# Patient Record
Sex: MALE | Race: WHITE | NOT HISPANIC OR LATINO | Employment: OTHER | URBAN - METROPOLITAN AREA
[De-identification: names, ages, dates, MRNs, and addresses within clinical notes are randomized per-mention and may not be internally consistent; named-entity substitution may affect disease eponyms.]

---

## 2017-01-10 ENCOUNTER — CLINICAL SUPPORT (OUTPATIENT)
Dept: REHABILITATION | Facility: HOSPITAL | Age: 62
End: 2017-01-10
Attending: ORTHOPAEDIC SURGERY
Payer: COMMERCIAL

## 2017-01-10 DIAGNOSIS — M19.049 CMC ARTHRITIS: Primary | ICD-10-CM

## 2017-01-10 DIAGNOSIS — R29.898 DECREASED PINCH STRENGTH: ICD-10-CM

## 2017-01-10 DIAGNOSIS — M25.649 STIFFNESS OF THUMB JOINT: ICD-10-CM

## 2017-01-10 DIAGNOSIS — M25.60 RANGE OF MOTION DEFICIT: ICD-10-CM

## 2017-01-10 DIAGNOSIS — M25.541 PAIN IN THUMB JOINT WITH MOVEMENT OF RIGHT HAND: ICD-10-CM

## 2017-01-10 PROCEDURE — 97140 MANUAL THERAPY 1/> REGIONS: CPT

## 2017-01-10 PROCEDURE — 97110 THERAPEUTIC EXERCISES: CPT

## 2017-01-10 PROCEDURE — 97035 APP MDLTY 1+ULTRASOUND EA 15: CPT

## 2017-01-10 NOTE — PROGRESS NOTES
"OT Daily Progress Note    Patient:  Mc GAMBLE [facility] #:  [id number]   Date of Note: 01/10/2017   Referring Physician:  Carlene Camp, *  Diagnosis:  R CMC Arthroplasty, volar capsulodesis, EPL stabilization, 1st dorsal compartment release , trapezium excision 9/21/2016  Encounter Diagnoses   Name Primary?    Range of motion deficit     Decreased pinch strength     Stiffness of thumb joint     Pain in thumb joint with movement of right hand     CMC arthritis Yes      NO FOTO/ 20 VISITS   VISIT 2 OF 12     Start Time: 115  End Time: 2  Total Time: 45 min  Group Time: 0      Subjective:   "Fingers are stiff, but I'm moving my thumb a little better, but its sore."  Pain: 3-4 v out of 10     Objective:   Patient seen by OT this session. Treatment  consist of the following: Patient received paraffin bath to R  hand(s) for 10  minutes to increase blood flow, circulation, pain management and for tissue elasticity prior to therex. Patient received ultrasound to  Thenar and radial wrist region to increase blood flow, circulation, tissue elasticity, pain management and for wound/scar management for 8 minutes @ 3 Mhz, Intensity 0.5  w/cm2 at 100%  duty cycle.    Performed MT including mobilization grade I-II  to IP/MP of thumb and digits  followed by PROM to increase joint mobility/flexibility followed by Blocking FDP, FDS, thumb flexion, opposition to ring finger,  reverse blocking PIP, intrinsic +/-, composite fist, finger ab/ad,  circumduction, ab/ad x 10 reps and wrist flex, ext, RD, UD x 10 reps each.  Added yellow clothespin for key pinch and 3 pt pinch x 10 reps; blue rubberband for EPL/APB and EDC strengthening x 10 reps each;  Performed "place and hold" for wrist extension for isometric strengthening against gravity x 10 reps.  Added yellow digi flex for isolated and composite finger flexion x 10 reps each.  Reviewed HEP,  Patient reported good understanding and use and HEP,modality use.     Treatment: " Paraffin x 10 min, Ultrasound x 8 min, Therapeutic exercises x 15 min and Manual therapy x 12  min     Assessment:  Patient compliant with orthotic use, wear and care schedule.  PIP extension lags remain.  Improved thumb mobility with opposition to ring finger.  Thumb IP/MP remain stiff and limited.  Improved CMC ROM noted.   Wrist extension remains limited at end range.  Significant fatigue and muscle atrophy/wasting noted of both intrinsic and extrinsic muscles of hand / forearm with resistive exercises.   Pt will continue to benefit from skilled OT intervention.   Patient is making good progress toward established goals.   Patient continues to demonstrate limitation  with  ROM, Joint mobility, Stiffness, Decreased fine motor coordination, Decreased strength, Continued pain and Scar adhesions.      Goals: (4 weeks)  Cont POC  1) Patient to be IND with HEP, orthotic use, wear and care schedule  and modalities for pain management  2) Increase ROM 3-5 degrees to increase functional hand use for ADLs/work/leisure activities  3) Assess  and pinch as able and set goals accordingly        Plan:  Continue 1-2x week x 6-8 weeks  during the certification period from   12/21/2016  to 2/21/2017  in pursuit of  OT goals.

## 2017-01-17 ENCOUNTER — CLINICAL SUPPORT (OUTPATIENT)
Dept: REHABILITATION | Facility: HOSPITAL | Age: 62
End: 2017-01-17
Payer: COMMERCIAL

## 2017-01-17 DIAGNOSIS — M19.049 CMC ARTHRITIS: Primary | ICD-10-CM

## 2017-01-17 DIAGNOSIS — R29.898 DECREASED PINCH STRENGTH: ICD-10-CM

## 2017-01-17 DIAGNOSIS — M25.541 PAIN IN THUMB JOINT WITH MOVEMENT OF RIGHT HAND: ICD-10-CM

## 2017-01-17 DIAGNOSIS — M25.60 RANGE OF MOTION DEFICIT: ICD-10-CM

## 2017-01-17 DIAGNOSIS — M25.649 STIFFNESS OF THUMB JOINT: ICD-10-CM

## 2017-01-17 PROCEDURE — 97110 THERAPEUTIC EXERCISES: CPT

## 2017-01-17 PROCEDURE — 97140 MANUAL THERAPY 1/> REGIONS: CPT

## 2017-01-17 PROCEDURE — 97035 APP MDLTY 1+ULTRASOUND EA 15: CPT

## 2017-01-18 ENCOUNTER — TELEPHONE (OUTPATIENT)
Dept: GENETICS | Facility: CLINIC | Age: 62
End: 2017-01-18

## 2017-01-18 NOTE — TELEPHONE ENCOUNTER
Spoke to Mr. Jeter disclose positive genetic test result for Marfan syndrome. 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 The patient gave consent to share this result with his daughter ([id number]) so she can be tested. We reviewed that each of his 4 daughters would have 50% chance of having this mutation. A copy of the report will be mailed to the home. Mr. Jeter expressed understanding and denied questions at this time.

## 2017-01-24 NOTE — PROGRESS NOTES
"OT Daily Progress Note    Patient:  Mc GAMBLE [facility] #:  [id number]   Date of Note: 1/17/2017  Referring Physician:  Carlene Camp, *  Diagnosis:  R CMC Arthroplasty, volar capsulodesis, EPL stabilization, 1st dorsal compartment release , trapezium excision 9/21/2016  Encounter Diagnoses   Name Primary?    Range of motion deficit     Decreased pinch strength     Stiffness of thumb joint     Pain in thumb joint with movement of right hand     CMC arthritis Yes      NO FOTO/ 20 VISITS   VISIT 3 OF 12     Start Time: 115  End Time: 2  Total Time: 45 min  Group Time: 0      Subjective:   "I think its doing ok, It's still stiff, but I'm trying to move it more, it hurts when I hit the scar."  Pain: 3-4 out of 10     Objective:   Patient seen by OT this session. Treatment  consist of the following: Patient received paraffin bath to R  hand(s) for 10  minutes to increase blood flow, circulation, pain management and for tissue elasticity prior to therex. Patient received ultrasound to  Thenar and radial wrist region to increase blood flow, circulation, tissue elasticity, pain management and for wound/scar management for 8 minutes @ 3 Mhz, Intensity 0.5  w/cm2 at 100%  duty cycle.    Performed MT including mobilization grade I-II  to IP/MP of thumb and digits  followed by PROM to increase joint mobility/flexibility followed by Blocking FDP, FDS, thumb flexion, opposition to ring finger,  reverse blocking PIP, intrinsic +/-, composite fist, finger ab/ad,  circumduction, ab/ad x 10 reps and wrist flex, ext, RD, UD x 10 reps each.  Added yellow clothespin for key pinch and 3 pt pinch x 10 reps; blue rubberband for EPL/APB and EDC strengthening x 10 reps each;  Performed "place and hold" for wrist extension for isometric strengthening against gravity x 10 reps.  Added yellow digi flex for isolated and composite finger flexion x 10 reps each.  Reviewed HEP,  Patient reported good understanding and use and " HEP,modality use.     Treatment: Paraffin x 10 min, Ultrasound x 8 min, Therapeutic exercises x 15 min and Manual therapy x 12  min     Assessment:  Patient compliant with orthotic use, wear and care schedule.  PIP extension lags remain.  Improved thumb mobility with opposition to ring finger.  Thumb IP/MP remain stiff and limited.  Improved CMC ROM noted.   Wrist extension remains limited at end range.  Significant fatigue and muscle atrophy/wasting noted of both intrinsic and extrinsic muscles of hand / forearm with resistive exercises.   Pt will continue to benefit from skilled OT intervention.   Patient is making good progress toward established goals.   Patient continues to demonstrate limitation  with  ROM, Joint mobility, Stiffness, Decreased fine motor coordination, Decreased strength, Continued pain and Scar adhesions.      Goals: (4 weeks)  Cont POC  1) Patient to be IND with HEP, orthotic use, wear and care schedule  and modalities for pain management  2) Increase ROM 3-5 degrees to increase functional hand use for ADLs/work/leisure activities  3) Assess  and pinch as able and set goals accordingly        Plan:  Continue 1-2x week x 6-8 weeks  during the certification period from   12/21/2016  to 2/21/2017  in pursuit of  OT goals.

## 2017-01-26 ENCOUNTER — CLINICAL SUPPORT (OUTPATIENT)
Dept: REHABILITATION | Facility: HOSPITAL | Age: 62
End: 2017-01-26
Payer: COMMERCIAL

## 2017-01-26 DIAGNOSIS — M25.60 RANGE OF MOTION DEFICIT: ICD-10-CM

## 2017-01-26 DIAGNOSIS — M25.649 STIFFNESS OF THUMB JOINT: ICD-10-CM

## 2017-01-26 DIAGNOSIS — M25.541 PAIN IN THUMB JOINT WITH MOVEMENT OF RIGHT HAND: ICD-10-CM

## 2017-01-26 DIAGNOSIS — M19.049 CMC ARTHRITIS: Primary | ICD-10-CM

## 2017-01-26 DIAGNOSIS — R29.898 DECREASED PINCH STRENGTH: ICD-10-CM

## 2017-01-26 PROCEDURE — 97140 MANUAL THERAPY 1/> REGIONS: CPT

## 2017-01-26 PROCEDURE — 97035 APP MDLTY 1+ULTRASOUND EA 15: CPT

## 2017-01-26 PROCEDURE — 97110 THERAPEUTIC EXERCISES: CPT

## 2017-01-26 NOTE — PROGRESS NOTES
"OT Daily Progress Note    Patient:  Mc GAMBLE [facility] #:  [id number]   Date of Note: 1/26/2017  Referring Physician:  Carlene Camp, *  Diagnosis:  R CMC Arthroplasty, volar capsulodesis, EPL stabilization, 1st dorsal compartment release , trapezium excision 9/21/2016  Encounter Diagnoses   Name Primary?    Range of motion deficit     Decreased pinch strength     Stiffness of thumb joint     Pain in thumb joint with movement of right hand     CMC arthritis Yes      NO FOTO/ 20 VISITS   VISIT 4 OF 12     Start Time: 115  End Time: 2  Total Time: 45 min  Group Time: 0      Subjective:   "I can tell its getting stronger.  It's still stiff, but I'm using it better to grasp things. "   Pain: 3  out of 10     Objective:   Patient seen by OT this session. Treatment  consist of the following: Patient received paraffin bath to R  hand(s) for 10  minutes to increase blood flow, circulation, pain management and for tissue elasticity prior to therex. Patient received ultrasound to  Thenar and radial wrist region to increase blood flow, circulation, tissue elasticity, pain management and for wound/scar management for 8 minutes @ 3 Mhz, Intensity 0.5  w/cm2 at 100%  duty cycle.    Performed MT including mobilization grade I-II  to IP/MP of thumb and digits  followed by PROM to increase joint mobility/flexibility followed by Blocking FDP, FDS, thumb flexion, opposition to ring finger,  reverse blocking PIP, intrinsic +/-, composite fist, finger ab/ad,  circumduction, ab/ad x 10 reps and wrist flex, ext, RD, UD x 10 reps each.  Added yellow clothespin for key pinch and 3 pt pinch x 10 reps; blue rubberband for EPL/APB and EDC strengthening x 10 reps each;  Performed "place and hold" for wrist extension for isometric strengthening against gravity x 10 reps.  Added yellow digi flex for isolated and composite finger flexion x 10 reps each.  Adjusted thumb orthotic to increase static progressive component to improve " passive thumb MP/IP flexion with tension.  Encouraged 30 min use, 3-4 x daily as tolerated.  Reviewed HEP,  Patient reported good understanding and use and HEP,modality use.     Treatment: Paraffin x 10 min, Ultrasound x 8 min, Therapeutic exercises x 15 min and Manual therapy x 12  min     Assessment:  Patient compliant with orthotic use, wear and care schedule.  PIP extension lags remain.  Improved thumb mobility with opposition to ring finger.  Thumb IP/MP remain stiff and limited.  Improved CMC ROM noted.   Wrist extension remains limited at end range.  Significant fatigue and muscle atrophy/wasting noted of both intrinsic and extrinsic muscles of hand / forearm with resistive exercises.  Strength appears to be improving per report and tolerance.  Pt will continue to benefit from skilled OT intervention.   Patient is making good progress toward established goals.   Patient continues to demonstrate limitation  with  ROM, Joint mobility, Stiffness, Decreased fine motor coordination, Decreased strength, Continued pain and Scar adhesions.      Goals: (4 weeks)  Cont POC  1) Patient to be IND with HEP, orthotic use, wear and care schedule  and modalities for pain management  2) Increase ROM 3-5 degrees to increase functional hand use for ADLs/work/leisure activities  3) Assess  and pinch as able and set goals accordingly        Plan:  Continue 1-2x week x 6-8 weeks  during the certification period from   12/21/2016  to 2/21/2017  in pursuit of  OT goals.

## 2017-01-30 ENCOUNTER — TELEPHONE (OUTPATIENT)
Dept: RHEUMATOLOGY | Facility: CLINIC | Age: 62
End: 2017-01-30

## 2017-01-30 ENCOUNTER — PATIENT MESSAGE (OUTPATIENT)
Dept: RHEUMATOLOGY | Facility: CLINIC | Age: 62
End: 2017-01-30

## 2017-01-30 RX ORDER — PREDNISONE 10 MG/1
20 TABLET ORAL DAILY
Qty: 60 TABLET | Refills: 0 | Status: SHIPPED | OUTPATIENT
Start: 2017-01-30 | End: 2017-03-01

## 2017-02-02 ENCOUNTER — CLINICAL SUPPORT (OUTPATIENT)
Dept: REHABILITATION | Facility: HOSPITAL | Age: 62
End: 2017-02-02
Payer: COMMERCIAL

## 2017-02-02 DIAGNOSIS — M25.649 STIFFNESS OF THUMB JOINT: ICD-10-CM

## 2017-02-02 DIAGNOSIS — M19.049 CMC ARTHRITIS: Primary | ICD-10-CM

## 2017-02-02 DIAGNOSIS — M25.541 PAIN IN THUMB JOINT WITH MOVEMENT OF RIGHT HAND: ICD-10-CM

## 2017-02-02 DIAGNOSIS — M25.60 RANGE OF MOTION DEFICIT: ICD-10-CM

## 2017-02-02 DIAGNOSIS — R29.898 DECREASED PINCH STRENGTH: ICD-10-CM

## 2017-02-02 PROCEDURE — 97110 THERAPEUTIC EXERCISES: CPT

## 2017-02-02 PROCEDURE — 97140 MANUAL THERAPY 1/> REGIONS: CPT

## 2017-02-02 PROCEDURE — 97035 APP MDLTY 1+ULTRASOUND EA 15: CPT

## 2017-02-02 NOTE — PROGRESS NOTES
"OT Daily Progress Note    Patient:  Mc GAMBLE [facility] #:  [id number]   Date of Note: 2/2/2017  Referring Physician:  Carlene Camp, *  Diagnosis:  R CMC Arthroplasty, volar capsulodesis, EPL stabilization, 1st dorsal compartment release , trapezium excision 9/21/2016  Encounter Diagnoses   Name Primary?    Range of motion deficit     Decreased pinch strength     Stiffness of thumb joint     Pain in thumb joint with movement of right hand     CMC arthritis Yes      NO FOTO/ 20 VISITS   VISIT 5 OF 12     Start Time: 1015  End Time: 11  Total Time: 45 min  Group Time: 0      Subjective:   "I was able to push a spray top, and I'm using clamps for  strengthening. It's still real weak, but I can tell its better. "   Pain: 3  out of 10     Objective:   Patient seen by OT this session. Treatment  consist of the following: Patient received paraffin bath to R  hand(s) for 10  minutes to increase blood flow, circulation, pain management and for tissue elasticity prior to therex.  Patient received ultrasound to  Thenar MP/IP joint and INDEX MP region to increase blood flow, circulation, tissue elasticity, pain management and for wound/scar management for 8 minutes @ 3 Mhz, Intensity 0.5  w/cm2 at 100%  duty cycle.    Performed MT including mobilization grade I-II  To wrist,  IP/MP of thumb and digits  followed by PROM to increase joint mobility/flexibility followed by Blocking FDP, FDS, thumb flexion, opposition to ring finger,  reverse blocking PIP, intrinsic +/-, composite fist, finger ab/ad,  circumduction, ab/ad x 10 reps and wrist flex, ext, RD, UD x 10 reps each.  Upgraded to red and green  clothespin for key pinch and 3 pt pinch x 10 reps; blue rubberband for EPL/APB and EDC strengthening x 10 reps each;  Performed "place and hold" for wrist extension for isometric strengthening against gravity x 10 reps. Upgraded to red  digi flex for isolated and composite finger flexion x 10 reps each.  Added 25 lbs. " PHG for gross  x 10 reps.    Encouraged low tension and increased time to promote stretching of MP/PIP for 30 min duration, 3-4 x daily as tolerated.  Reviewed HEP,  Patient reported good understanding and use and HEP,modality use. Re-assessed as follows:      ROM: THUMB / INDEX/ MIDDLE / RING /SMALL   MP  0/20  0/80  25/90  0/60  0/55   PIP 0/31 26/92  30/90  34/102 48/105   DIP ---- 0/38 0/60 0/54  0/45     BENAVIDES 51 184  185 182  157         +31  +48  +40  +24  +6     WRIST EXT/FLEX  25 / 65  (+15)      R) 3 LBS.   L) 75 LBS   KEY PINCH R) 4 PSI  L) 10 PSI   3PT PINCH R) 3 PSI  L) 13.5 PSI   2PT PINCH R) 2 PSI L) 12 PSI     Treatment: Paraffin x 10 min, Ultrasound x 8 min, Therapeutic exercises x 15 min and Manual therapy x 12  min     Assessment:  Patient compliant with orthotic use, wear and care schedule.  PIP extension lags remain.  Improved thumb mobility with opposition to ring finger and medial aspect of small finger.  Thumb IP/MP remain stiff and limited, but ROM overall improved.  Improved CMC ROM noted.   Wrist extension remains limited at end range.  Significant fatigue and muscle atrophy/wasting noted of both intrinsic and extrinsic muscles of hand / forearm with resistive exercises.  Strength appears to be improving per report and tolerance.   and pinch strength remain limited.  Pt will continue to benefit from skilled OT intervention.   Patient is making good progress toward established goals.   Patient continues to demonstrate limitation  with  ROM, Joint mobility, Stiffness, Decreased fine motor coordination, Decreased strength, Continued pain and Scar adhesions.      Goals: (4 weeks)  Cont POC  1) Patient to be IND with HEP, orthotic use, wear and care schedule  and modalities for pain management  2) Increase ROM 3-5 degrees to increase functional hand use for ADLs/work/leisure activities  3) Assess  and pinch as able and set goals accordingly        Plan:  Continue 1-2x week x 6-8  weeks  during the certification period from   12/21/2016  to 2/21/2017  in pursuit of  OT goals.

## 2017-02-09 ENCOUNTER — CLINICAL SUPPORT (OUTPATIENT)
Dept: REHABILITATION | Facility: HOSPITAL | Age: 62
End: 2017-02-09
Payer: COMMERCIAL

## 2017-02-09 ENCOUNTER — OFFICE VISIT (OUTPATIENT)
Dept: ORTHOPEDICS | Facility: CLINIC | Age: 62
End: 2017-02-09
Payer: COMMERCIAL

## 2017-02-09 VITALS
HEIGHT: 78 IN | WEIGHT: 213.88 LBS | HEART RATE: 91 BPM | SYSTOLIC BLOOD PRESSURE: 128 MMHG | BODY MASS INDEX: 24.74 KG/M2 | DIASTOLIC BLOOD PRESSURE: 73 MMHG

## 2017-02-09 DIAGNOSIS — M25.60 RANGE OF MOTION DEFICIT: ICD-10-CM

## 2017-02-09 DIAGNOSIS — R29.898 DECREASED PINCH STRENGTH: ICD-10-CM

## 2017-02-09 DIAGNOSIS — M25.649 STIFFNESS OF THUMB JOINT: ICD-10-CM

## 2017-02-09 DIAGNOSIS — M19.049 CMC ARTHRITIS: Primary | ICD-10-CM

## 2017-02-09 DIAGNOSIS — M18.11 PRIMARY OSTEOARTHRITIS OF FIRST CARPOMETACARPAL JOINT OF RIGHT HAND: ICD-10-CM

## 2017-02-09 DIAGNOSIS — M25.541 PAIN IN THUMB JOINT WITH MOVEMENT OF RIGHT HAND: ICD-10-CM

## 2017-02-09 DIAGNOSIS — Z98.890 POSTOPERATIVE STATE: Primary | ICD-10-CM

## 2017-02-09 PROCEDURE — 97035 APP MDLTY 1+ULTRASOUND EA 15: CPT

## 2017-02-09 PROCEDURE — 99213 OFFICE O/P EST LOW 20 MIN: CPT | Mod: S$GLB,,, | Performed by: ORTHOPAEDIC SURGERY

## 2017-02-09 PROCEDURE — 97110 THERAPEUTIC EXERCISES: CPT

## 2017-02-09 PROCEDURE — 99999 PR PBB SHADOW E&M-EST. PATIENT-LVL III: CPT | Mod: PBBFAC,,, | Performed by: ORTHOPAEDIC SURGERY

## 2017-02-09 PROCEDURE — 97140 MANUAL THERAPY 1/> REGIONS: CPT

## 2017-02-09 NOTE — PROGRESS NOTES
"OT Daily Progress Note    Patient:  Mc GAMBLE [facility] #:  [id number]   Date of Note: 2/9/2017  Referring Physician:  Carlene Camp, *  Diagnosis:  R CMC Arthroplasty, volar capsulodesis, EPL stabilization, 1st dorsal compartment release , trapezium excision 9/21/2016  Encounter Diagnoses   Name Primary?    Range of motion deficit     Decreased pinch strength     Stiffness of thumb joint     Pain in thumb joint with movement of right hand     CMC arthritis Yes      NO FOTO/ 20 VISITS   VISIT 6 OF 12     Start Time: 1115  End Time: 12  Total Time: 45 min  Group Time: 0      Subjective:   "I'm going to the gym but my  is still weak and hard to pull.   "   Pain: 3  out of 10     Objective:   Patient seen by MD prior to OT visit this day.  Improved progress noted in last month.      Patient seen by OT this session. Treatment  consist of the following: Patient received paraffin bath to R  hand(s) for 10  minutes to increase blood flow, circulation, pain management and for tissue elasticity prior to therex.  Patient received ultrasound to  Thenar MP/IP joint and INDEX MP region to increase blood flow, circulation, tissue elasticity, pain management and for wound/scar management for 8 minutes @ 3 Mhz, Intensity 0.5  w/cm2 at 100%  duty cycle.    Performed MT including mobilization grade I-II  To wrist,  IP/MP of thumb and digits  followed by PROM to increase joint mobility/flexibility followed by Blocking FDP, FDS, thumb flexion, opposition to ring finger,  reverse blocking PIP, intrinsic +/-, composite fist, finger ab/ad,  circumduction, ab/ad x 10 reps and wrist flex, ext, RD, UD x 10 reps each.  Added 2# wrist flex, ext, RD, UD, sup/pron x 10 reps each for UE strengthening.  Upgraded to red and green  clothespin for key pinch and 3 pt pinch x 10 reps; blue rubberband for EPL/APB and EDC strengthening x 10 reps each;   Upgraded to red  digi flex for isolated and composite finger flexion x 10 reps each.  " Added 35 lbs./25lbs PHG for gross  x 10 reps each.    Encouraged low tension and increased time with static progressive orthotic to promote stretching of MP/PIP for 30 min duration, 3-4 x daily as tolerated.  Reviewed HEP,  Patient reported good understanding and use and HEP,modality use. Re-assessed 2/2/16:      Treatment: Paraffin x 10 min, Ultrasound x 8 min, Therapeutic exercises x 15 min and Manual therapy x 12  min     Assessment:  Patient compliant with orthotic use, wear and care schedule.  PIP extension lags remain in index/small finger, but continues to improve  Improved thumb mobility with opposition to ring finger and medial aspect of small finger.  Thumb IP/MP remain stiff and limited, but ROM overall improved.  Improved CMC ROM noted.   Wrist extension remains limited at end range.  Decreased muscle fatigue reported.  Strength appears to be improving per report and tolerance.   and pinch strength remain limited.  Pt will continue to benefit from skilled OT intervention.   Patient is making good progress toward established goals.   Patient continues to demonstrate limitation  with  ROM, Joint mobility, Stiffness, Decreased fine motor coordination, Decreased strength, Continued pain and Scar adhesions.      Goals: (4 weeks)  Cont POC  1) Patient to be IND with HEP, orthotic use, wear and care schedule  and modalities for pain management  2) Increase ROM 3-5 degrees to increase functional hand use for ADLs/work/leisure activities  3) Assess  and pinch as able and set goals accordingly        Plan:  Continue 1-2x week x 6-8 weeks  during the certification period from   12/21/2016  to 2/21/2017  in pursuit of  OT goals.

## 2017-02-10 NOTE — PROGRESS NOTES
"Mr. Jeter is here today for a post-operative visit.he is 4.5 months status post     PROCEDURE:  1. Right thumb CMC arthroplasty.  2. Right thumb MCP volar capsulodesis.  3. Right thumb EPL tendon stabilization of sagittal band.  4. First dorsal compartment release.  5. Beaver tendon for transfer.     by Dr. Camp on 9/21/16. he reports that he is doing well in terms of pain but continues to have trouble with certain movements of his thumb and stiffness of all of the fingers of the right hand. He is working in therapy with Dora at [facility] as of 12/21/2016- he had previously been doing therapy in [place] however had increased difficulty with stiffness and movement of the thumb and right hand fingers and advised to change therapy to [facility]. he denies any s/s of infection, fever, chills, and sweats since the time of the surgery. He states he is wearing the splint made for him in therapy with activity for protection.     Physical exam:    Vitals:    02/09/17 1104   BP: 128/73   Pulse: 91   Weight: 97 kg (213 lb 13.5 oz)   Height: 6' 6" (1.981 m)   PainSc: 0-No pain   PainLoc: Hand     The right hand shows not s/s of infection. I do no appreciate swelling of the hand or fingers. He continues to have stiffness at the PIP joints of digits 2-5 and is unable to lay fingers flat on table- but has improved since last visit. The abduction and ROM of the MCP of his thumb has greatly improved since his last visit however he still has stiffness of the IP joint, specifically when trying to make a grasping motion as it to hold a can.  Normal ROM at the wrist.     Assessment:   4.5 months status post     PROCEDURE:  1. Right thumb CMC arthroplasty.  2. Right thumb MCP volar capsulodesis.  3. Right thumb EPL tendon stabilization of sagittal band.  4. First dorsal compartment release.  5. Beaver tendon for transfer.    Plan:  Mc was seen today for post-op evaluation and pain.    Diagnoses and all orders for this " visit:    Postoperative state    Primary osteoarthritis of first carpometacarpal joint of right hand    - Definite improvement with therapy with Dora at [facility]  - Continue OT  - RTC 3.5 m for 8 m post op

## 2017-02-13 NOTE — PROGRESS NOTES
I have personally taken the history and examined the patient. I agree with the Hand Surgery NP's note. The plan will be cont OT> Pt has improved functionality since changing to new OT. Less stiffness, able to grasp. Pt happy with improvements.

## 2017-02-14 ENCOUNTER — CLINICAL SUPPORT (OUTPATIENT)
Dept: REHABILITATION | Facility: HOSPITAL | Age: 62
End: 2017-02-14
Payer: COMMERCIAL

## 2017-02-14 DIAGNOSIS — M19.049 CMC ARTHRITIS: Primary | ICD-10-CM

## 2017-02-14 DIAGNOSIS — M25.60 RANGE OF MOTION DEFICIT: ICD-10-CM

## 2017-02-14 DIAGNOSIS — M25.541 PAIN IN THUMB JOINT WITH MOVEMENT OF RIGHT HAND: ICD-10-CM

## 2017-02-14 DIAGNOSIS — R29.898 DECREASED PINCH STRENGTH: ICD-10-CM

## 2017-02-14 DIAGNOSIS — M25.649 STIFFNESS OF THUMB JOINT: ICD-10-CM

## 2017-02-14 PROCEDURE — 97110 THERAPEUTIC EXERCISES: CPT

## 2017-02-14 PROCEDURE — 97035 APP MDLTY 1+ULTRASOUND EA 15: CPT

## 2017-02-14 PROCEDURE — 97140 MANUAL THERAPY 1/> REGIONS: CPT

## 2017-02-14 NOTE — PROGRESS NOTES
"OT Daily Progress Note    Patient:  Mc GAMBLE [facility] #:  [id number]   Date of Note: 2/14/2017  Referring Physician:  Carlene Camp, *  Diagnosis:  R CMC Arthroplasty, volar capsulodesis, EPL stabilization, 1st dorsal compartment release , trapezium excision 9/21/2016  Encounter Diagnoses   Name Primary?    Range of motion deficit     Decreased pinch strength     Stiffness of thumb joint     Pain in thumb joint with movement of right hand     CMC arthritis Yes      NO FOTO/ 20 VISITS   VISIT 7 OF 12     Start Time: 1115  End Time: 12  Total Time: 45 min  Group Time: 0      Subjective:   "I tried to use a hammer, it wasn't too good, but I was putting up a fence.   I use splint (static progressive splint) a few times a day. "   Pain: 3  out of 10     Objective:     Patient seen by OT this session. Treatment  consist of the following: Patient received paraffin bath to R  hand(s) for 10  minutes to increase blood flow, circulation, pain management and for tissue elasticity prior to therex.  Patient received ultrasound to  Thenar MP/IP joint and INDEX MP region to increase blood flow, circulation, tissue elasticity, pain management and for wound/scar management for 8 minutes @ 3 Mhz, Intensity 0.5  w/cm2 at 100%  duty cycle.    Performed MT including mobilization grade I-II  To wrist,  IP/MP of thumb and digits  followed by PROM to increase joint mobility/flexibility followed by Blocking FDP, FDS, thumb flexion, opposition to ring finger,  reverse blocking PIP, intrinsic +/-, composite fist, finger ab/ad,  circumduction, ab/ad x 10 reps and wrist flex, ext, RD, UD x 10 reps each.  Added 2# wrist flex, ext, RD, UD (gravity eliminated) , sup/pron x 10 reps each for UE strengthening. Performed  red  clothespin for key pinch and 3 pt pinch x 10 reps;    Upgraded to red  digi flex for isolated and composite finger flexion x 10 reps each.  Added 35 lbs./25lbs PHG for gross  x 10 reps each.    Encouraged low " tension and increased time with static progressive orthotic to promote stretching of MP/PIP for 30 min duration, 3-4 x daily as tolerated.  Reviewed HEP,  Patient reported good understanding and use and HEP,modality use. Re-assessed 2/2/16:      Treatment: Paraffin x 10 min, Ultrasound x 8 min, Therapeutic exercises x 15 min and Manual therapy x 12  min     Assessment:  Patient compliant with orthotic use, wear and care schedule.  PIP extension lags remain in index/small finger, but continues to improve.     Improved thumb mobility with opposition to ring finger and medial aspect of small finger.  Thumb IP/MP remain stiff and limited, but ROM overall improved.  Improved CMC ROM noted.   Wrist extension remains limited at end range.  Muscle fatigue noted this day secondary to overuse reported.   Strength appears to be improving per report and tolerance.   and pinch strength remain limited.  Pt will continue to benefit from skilled OT intervention.   Patient is making good progress toward established goals.   Patient continues to demonstrate limitation  with  ROM, Joint mobility, Stiffness, Decreased fine motor coordination, Decreased strength, Continued pain and Scar adhesions.      Goals: (4 weeks)  Cont POC  1) Patient to be IND with HEP, orthotic use, wear and care schedule  and modalities for pain management  2) Increase ROM 3-5 degrees to increase functional hand use for ADLs/work/leisure activities  3) Assess  and pinch as able and set goals accordingly        Plan:  Continue 1-2x week x 6-8 weeks  during the certification period from   12/21/2016  to 2/21/2017  in pursuit of  OT goals.

## 2017-02-23 ENCOUNTER — CLINICAL SUPPORT (OUTPATIENT)
Dept: REHABILITATION | Facility: HOSPITAL | Age: 62
End: 2017-02-23
Payer: COMMERCIAL

## 2017-02-23 DIAGNOSIS — M25.541 PAIN IN THUMB JOINT WITH MOVEMENT OF RIGHT HAND: ICD-10-CM

## 2017-02-23 DIAGNOSIS — R29.898 DECREASED PINCH STRENGTH: ICD-10-CM

## 2017-02-23 DIAGNOSIS — M19.049 CMC ARTHRITIS: Primary | ICD-10-CM

## 2017-02-23 DIAGNOSIS — M25.60 RANGE OF MOTION DEFICIT: ICD-10-CM

## 2017-02-23 DIAGNOSIS — M25.649 STIFFNESS OF THUMB JOINT: ICD-10-CM

## 2017-02-23 PROCEDURE — 97035 APP MDLTY 1+ULTRASOUND EA 15: CPT

## 2017-02-23 PROCEDURE — 97140 MANUAL THERAPY 1/> REGIONS: CPT

## 2017-02-23 PROCEDURE — 97110 THERAPEUTIC EXERCISES: CPT

## 2017-02-23 NOTE — PROGRESS NOTES
"OT Daily Progress Note    Patient:  Mc GAMBLE [facility] #:  [id number]   Date of Note: 2/23/2017  Referring Physician:  Carlene Camp, *  Diagnosis:  R CMC Arthroplasty, volar capsulodesis, EPL stabilization, 1st dorsal compartment release , trapezium excision 9/21/2016  Encounter Diagnoses   Name Primary?    Range of motion deficit     Decreased pinch strength     Stiffness of thumb joint     Pain in thumb joint with movement of right hand     CMC arthritis Yes      NO FOTO/ 20 VISITS   VISIT 8 OF 12     Start Time: 115  End Time: 2  Total Time: 45 min  Group Time: 0      Subjective:   "I only have pain in joint (MP of thumb), but I'm exercising the wrist more. "   Pain: 3  out of 10     Objective:     Patient seen by OT this session. Treatment  consist of the following: Patient received paraffin bath to R  hand(s) for 10  minutes to increase blood flow, circulation, pain management and for tissue elasticity prior to therex.  Patient received ultrasound to  Thenar MP/IP joint and INDEX MP region to increase blood flow, circulation, tissue elasticity, pain management and for wound/scar management for 8 minutes @ 3 Mhz, Intensity 0.5  w/cm2 at 100%  duty cycle.    Performed MT including mobilization grade I-II  To wrist,  IP/MP of thumb and digits  followed by PROM to increase joint mobility/flexibility followed by therex including  Blocking FDP, FDS, thumb flexion, opposition to ring finger,  reverse blocking PIP, intrinsic +/-, composite fist, finger ab/ad,  circumduction, ab/ad x 10 reps and wrist flex, ext, RD, UD x 10 reps each.  Added 2# wrist flex, ext, RD, UD (gravity eliminated) , sup/pron x 10 reps each for UE strengthening. Performed  red  clothespin for key pinch and 3 pt pinch x 10 reps;    Upgraded to red  digi flex for isolated and composite finger flexion x 10 reps each.  Added 35 lbs./25lbs PHG for gross  x 10 reps each.    Reviewed HEP,  Patient reported good understanding and use " and HEP,modality use. Re-assessed 2/2/16:      ROM: THUMB / INDEX/ MIDDLE / RING /SMALL   MP   0/20   0/80   0/90   0/70   0/65   PIP  0/31  25/92 27/90 25/102 45/102   DIP  ----     0/50  0/55  0/54  0/48      BENAVIDES  51  197   208  201   170            +31       +61       +63   +43 +19     WRIST EXT/FLEX 25 / 65 (+15)       R) 3 LBS. L) 75 LBS   KEY PINCH R) 5 PSI   (+1)  L) 10 PSI   3PT PINCH R) 3 PSI L) 13.5 PSI   2PT PINCH R) 2 PSI L) 12 PSI       Treatment: Paraffin x 10 min, Ultrasound x 8 min, Therapeutic exercises x 15 min and Manual therapy x 12  min     Assessment:  ROM continues to improve.  Slight increase in key pinch, no significant change in  or 2/3pt pinch Patient compliant with orthotic use, wear and care schedule.  PIP extension lags remain in index/small finger, but continues to improve.     Improved thumb mobility with opposition to ring finger and medial aspect of small finger.  Thumb IP/MP remain stiff and limited, but ROM overall improved.  Improved CMC ROM noted.   Wrist extension remains limited at end range.  Muscle fatigue noted this day secondary to overuse reported.   Strength appears to be improving per report and tolerance.   and pinch strength remain limited.  Pt will continue to benefit from skilled OT intervention.   Patient is making good progress toward established goals.   Patient continues to demonstrate limitation  with  ROM, Joint mobility, Stiffness, Decreased fine motor coordination, Decreased strength, Continued pain and Scar adhesions.      Goals: (4 weeks)  Cont POC  1) Patient to be IND with HEP, orthotic use, wear and care schedule  and modalities for pain management  2) Increase ROM 3-5 degrees to increase functional hand use for ADLs/work/leisure activities--met   3) Assess  and pinch as able and set goals accordingly --met     Updated goals:   1)  Increase ROM 3-5 degrees to increase functional hand use for ADLs/work/leisure activities  2)  Increase  pinch 1-3 psi's for taking care of chicken and household chores  3)  Increase  2-6 lbs. For grasping tools and performing home repairs     Plan:  Continue 1-2x week x 6-8 weeks  during the certification period from   2/23/2017 to 4/23/2017   in pursuit of  OT goals.      I certify the need for these services furnished under the plan of treatment and while under my care.     ____________________________________________________________________  Physician/Referring Practitioner   Date of Signature

## 2017-03-02 ENCOUNTER — CLINICAL SUPPORT (OUTPATIENT)
Dept: REHABILITATION | Facility: HOSPITAL | Age: 62
End: 2017-03-02
Payer: COMMERCIAL

## 2017-03-02 DIAGNOSIS — R29.898 DECREASED PINCH STRENGTH: ICD-10-CM

## 2017-03-02 DIAGNOSIS — M25.649 STIFFNESS OF THUMB JOINT: ICD-10-CM

## 2017-03-02 DIAGNOSIS — M25.541 PAIN IN THUMB JOINT WITH MOVEMENT OF RIGHT HAND: ICD-10-CM

## 2017-03-02 DIAGNOSIS — M25.60 RANGE OF MOTION DEFICIT: ICD-10-CM

## 2017-03-02 DIAGNOSIS — M19.049 CMC ARTHRITIS: Primary | ICD-10-CM

## 2017-03-02 PROCEDURE — 97110 THERAPEUTIC EXERCISES: CPT

## 2017-03-02 PROCEDURE — 97035 APP MDLTY 1+ULTRASOUND EA 15: CPT

## 2017-03-02 PROCEDURE — 97140 MANUAL THERAPY 1/> REGIONS: CPT

## 2017-03-07 ENCOUNTER — PATIENT MESSAGE (OUTPATIENT)
Dept: RHEUMATOLOGY | Facility: CLINIC | Age: 62
End: 2017-03-07

## 2017-03-09 ENCOUNTER — CLINICAL SUPPORT (OUTPATIENT)
Dept: REHABILITATION | Facility: HOSPITAL | Age: 62
End: 2017-03-09
Payer: COMMERCIAL

## 2017-03-09 DIAGNOSIS — M25.541 PAIN IN THUMB JOINT WITH MOVEMENT OF RIGHT HAND: ICD-10-CM

## 2017-03-09 DIAGNOSIS — M25.649 STIFFNESS OF THUMB JOINT: ICD-10-CM

## 2017-03-09 DIAGNOSIS — M25.60 RANGE OF MOTION DEFICIT: ICD-10-CM

## 2017-03-09 DIAGNOSIS — R29.898 DECREASED PINCH STRENGTH: ICD-10-CM

## 2017-03-09 DIAGNOSIS — M19.049 CMC ARTHRITIS: Primary | ICD-10-CM

## 2017-03-09 PROCEDURE — 97035 APP MDLTY 1+ULTRASOUND EA 15: CPT

## 2017-03-09 PROCEDURE — 97140 MANUAL THERAPY 1/> REGIONS: CPT

## 2017-03-09 PROCEDURE — 97110 THERAPEUTIC EXERCISES: CPT

## 2017-03-13 NOTE — PLAN OF CARE
"OT Daily Progress Note    Patient:  Mc GAMBLE [facility] #:  [id number]   Date of Note: 2/23/2017  Referring Physician:  Carlene Camp, *  Diagnosis:  R CMC Arthroplasty, volar capsulodesis, EPL stabilization, 1st dorsal compartment release , trapezium excision 9/21/2016  Encounter Diagnoses   Name Primary?    Range of motion deficit     Decreased pinch strength     Stiffness of thumb joint     Pain in thumb joint with movement of right hand     CMC arthritis Yes      NO FOTO/ 20 VISITS   VISIT 8 OF 12     Start Time: 115  End Time: 2  Total Time: 45 min  Group Time: 0      Subjective:   "I only have pain in joint (MP of thumb), but I'm exercising the wrist more. "   Pain: 3  out of 10     Objective:     Patient seen by OT this session. Treatment  consist of the following: Patient received paraffin bath to R  hand(s) for 10  minutes to increase blood flow, circulation, pain management and for tissue elasticity prior to therex.  Patient received ultrasound to  Thenar MP/IP joint and INDEX MP region to increase blood flow, circulation, tissue elasticity, pain management and for wound/scar management for 8 minutes @ 3 Mhz, Intensity 0.5  w/cm2 at 100%  duty cycle.    Performed MT including mobilization grade I-II  To wrist,  IP/MP of thumb and digits  followed by PROM to increase joint mobility/flexibility followed by therex including  Blocking FDP, FDS, thumb flexion, opposition to ring finger,  reverse blocking PIP, intrinsic +/-, composite fist, finger ab/ad,  circumduction, ab/ad x 10 reps and wrist flex, ext, RD, UD x 10 reps each.  Added 2# wrist flex, ext, RD, UD (gravity eliminated) , sup/pron x 10 reps each for UE strengthening. Performed  red  clothespin for key pinch and 3 pt pinch x 10 reps;    Upgraded to red  digi flex for isolated and composite finger flexion x 10 reps each.  Added 35 lbs./25lbs PHG for gross  x 10 reps each.    Reviewed HEP,  Patient reported good understanding and use " and HEP,modality use. Re-assessed 2/2/16:      ROM: THUMB / INDEX/ MIDDLE / RING /SMALL   MP   0/20   0/80   0/90   0/70   0/65   PIP  0/31  25/92 27/90 25/102 45/102   DIP  ----     0/50  0/55  0/54  0/48      BENAVIDES  51  197   208  201   170            +31       +61       +63   +43 +19     WRIST EXT/FLEX 25 / 65 (+15)       R) 3 LBS. L) 75 LBS   KEY PINCH R) 5 PSI   (+1)  L) 10 PSI   3PT PINCH R) 3 PSI L) 13.5 PSI   2PT PINCH R) 2 PSI L) 12 PSI       Treatment: Paraffin x 10 min, Ultrasound x 8 min, Therapeutic exercises x 15 min and Manual therapy x 12  min     Assessment:  ROM continues to improve.  Slight increase in key pinch, no significant change in  or 2/3pt pinch Patient compliant with orthotic use, wear and care schedule.  PIP extension lags remain in index/small finger, but continues to improve.     Improved thumb mobility with opposition to ring finger and medial aspect of small finger.  Thumb IP/MP remain stiff and limited, but ROM overall improved.  Improved CMC ROM noted.   Wrist extension remains limited at end range.  Muscle fatigue noted this day secondary to overuse reported.   Strength appears to be improving per report and tolerance.   and pinch strength remain limited.  Pt will continue to benefit from skilled OT intervention.   Patient is making good progress toward established goals.   Patient continues to demonstrate limitation  with  ROM, Joint mobility, Stiffness, Decreased fine motor coordination, Decreased strength, Continued pain and Scar adhesions.      Goals: (4 weeks)  Cont POC  1) Patient to be IND with HEP, orthotic use, wear and care schedule  and modalities for pain management  2) Increase ROM 3-5 degrees to increase functional hand use for ADLs/work/leisure activities--met   3) Assess  and pinch as able and set goals accordingly --met     Updated goals:   1)  Increase ROM 3-5 degrees to increase functional hand use for ADLs/work/leisure activities  2)  Increase  pinch 1-3 psi's for taking care of chicken and household chores  3)  Increase  2-6 lbs. For grasping tools and performing home repairs     Plan:  Continue 1-2x week x 6-8 weeks  during the certification period from   2/23/2017 to 4/23/2017   in pursuit of  OT goals.      I certify the need for these services furnished under the plan of treatment and while under my care.     ____________________________________________________________________  Physician/Referring Practitioner   Date of Signature

## 2017-03-15 ENCOUNTER — CLINICAL SUPPORT (OUTPATIENT)
Dept: REHABILITATION | Facility: HOSPITAL | Age: 62
End: 2017-03-15
Payer: COMMERCIAL

## 2017-03-15 DIAGNOSIS — M85.80 OSTEOPENIA: Chronic | ICD-10-CM

## 2017-03-15 DIAGNOSIS — M25.541 PAIN IN THUMB JOINT WITH MOVEMENT OF RIGHT HAND: ICD-10-CM

## 2017-03-15 DIAGNOSIS — R29.898 DECREASED PINCH STRENGTH: ICD-10-CM

## 2017-03-15 DIAGNOSIS — M19.049 CMC ARTHRITIS: Primary | ICD-10-CM

## 2017-03-15 DIAGNOSIS — M25.649 STIFFNESS OF THUMB JOINT: ICD-10-CM

## 2017-03-15 DIAGNOSIS — M25.60 RANGE OF MOTION DEFICIT: ICD-10-CM

## 2017-03-15 PROCEDURE — 97035 APP MDLTY 1+ULTRASOUND EA 15: CPT

## 2017-03-15 PROCEDURE — 97110 THERAPEUTIC EXERCISES: CPT

## 2017-03-15 PROCEDURE — 97140 MANUAL THERAPY 1/> REGIONS: CPT

## 2017-03-15 RX ORDER — ALENDRONATE SODIUM 70 MG/1
TABLET ORAL
Qty: 4 TABLET | Refills: 0 | Status: SHIPPED | OUTPATIENT
Start: 2017-03-15 | End: 2017-07-28

## 2017-03-15 NOTE — PROGRESS NOTES
"OT Daily Progress Note    Patient:  Mc GAMBLE [facility] #:  [id number]   Date of Note: 3/9/2017  Referring Physician:  Carlene Camp, *  Diagnosis:  R CMC Arthroplasty, volar capsulodesis, EPL stabilization, 1st dorsal compartment release , trapezium excision 9/21/2016  Encounter Diagnoses   Name Primary?    Range of motion deficit     Decreased pinch strength     Stiffness of thumb joint     Pain in thumb joint with movement of right hand     CMC arthritis Yes      NO FOTO/ 20 VISITS   VISIT 9 OF 12     Start Time: 115  End Time: 2  Total Time: 45 min  Group Time: 0      Subjective:   "I'm using it more, I think its getting stronger, I can hammer a little better, my  is still weak, but getting better."   Pain:2- 3  out of 10     Objective:     Patient seen by OT this session. Treatment  consist of the following: Patient received paraffin bath to R  hand(s) for 10  minutes to increase blood flow, circulation, pain management and for tissue elasticity prior to therex.  Patient received ultrasound to  Thenar MP/IP joint and INDEX MP region to increase blood flow, circulation, tissue elasticity, pain management and for wound/scar management for 8 minutes @ 3 Mhz, Intensity 0.5  w/cm2 at 100%  duty cycle.    Performed MT including mobilization grade I-II  To wrist,  IP/MP of thumb and digits  followed by PROM to increase joint mobility/flexibility followed by therex including  Blocking FDP, FDS, thumb flexion, opposition to ring finger,  reverse blocking PIP, intrinsic +/-, composite fist, finger ab/ad,  circumduction, ab/ad x 10 reps and wrist flex, ext, RD, UD x 10 reps each.  Added 2# wrist flex, ext, RD, UD (gravity eliminated) , sup/pron x 10 reps each for UE strengthening. Performed  red  clothespin for key pinch and 3 pt pinch x 10 reps;    Upgraded to red  digi flex for isolated and composite finger flexion x 10 reps each.  Added 35 lbs./25lbs PHG for gross  x 10 reps each.    Reviewed HEP,  " Patient reported good understanding and use and HEP,modality use. Re-assessed 2/2/16:  RE-assessed 2/23/17      Treatment: Paraffin x 10 min, Ultrasound x 8 min, Therapeutic exercises x 15 min and Manual therapy x 12  min     Assessment:  ROM continues to improve.   and pinch strength remain limited.  Patient compliant with orthotic use, wear and care schedule.  PIP extension lags remain in index/small finger, but continues to improve.     Improved thumb mobility with opposition to ring finger and medial aspect of small finger.  Thumb IP/MP remain stiff and limited, but ROM overall improved.  Improved CMC ROM noted.   Wrist extension remains limited at end range.  Minimal muscle fatigue noted following therex.    Strength appears to be improving per report and tolerance.    Pt will continue to benefit from skilled OT intervention.   Patient is making good progress toward established goals.   Patient continues to demonstrate limitation  with  ROM, Joint mobility, Stiffness, Decreased fine motor coordination, Decreased strength, Continued pain and Scar adhesions.      Goals: (4 weeks)  Cont POC  1) Patient to be IND with HEP, orthotic use, wear and care schedule  and modalities for pain management  2) Increase ROM 3-5 degrees to increase functional hand use for ADLs/work/leisure activities--met   3) Assess  and pinch as able and set goals accordingly --met     Updated goals:   1)  Increase ROM 3-5 degrees to increase functional hand use for ADLs/work/leisure activities  2)  Increase pinch 1-3 psi's for taking care of chicken and household chores  3)  Increase  2-6 lbs. For grasping tools and performing home repairs     Plan:  Continue 1-2x week x 6-8 weeks  during the certification period from   2/23/2017 to 4/23/2017   in pursuit of  OT goals.

## 2017-03-17 DIAGNOSIS — E11.9 TYPE 2 DIABETES MELLITUS WITHOUT COMPLICATION: ICD-10-CM

## 2017-03-21 RX ORDER — PREDNISONE 1 MG/1
TABLET ORAL
Qty: 120 TABLET | Refills: 3 | Status: SHIPPED | OUTPATIENT
Start: 2017-03-21 | End: 2023-08-29

## 2017-03-21 RX ORDER — FOSINOPRIL SODIUM 20 MG/1
20 TABLET ORAL DAILY
Qty: 90 TABLET | Refills: 3 | Status: SHIPPED | OUTPATIENT
Start: 2017-03-21 | End: 2017-06-26 | Stop reason: SINTOL

## 2017-03-22 ENCOUNTER — CLINICAL SUPPORT (OUTPATIENT)
Dept: REHABILITATION | Facility: HOSPITAL | Age: 62
End: 2017-03-22
Payer: COMMERCIAL

## 2017-03-22 DIAGNOSIS — M25.60 RANGE OF MOTION DEFICIT: ICD-10-CM

## 2017-03-22 DIAGNOSIS — M25.541 PAIN IN THUMB JOINT WITH MOVEMENT OF RIGHT HAND: ICD-10-CM

## 2017-03-22 DIAGNOSIS — R29.898 DECREASED PINCH STRENGTH: ICD-10-CM

## 2017-03-22 DIAGNOSIS — M19.049 CMC ARTHRITIS: Primary | ICD-10-CM

## 2017-03-22 DIAGNOSIS — M25.649 STIFFNESS OF THUMB JOINT: ICD-10-CM

## 2017-03-22 PROCEDURE — 97035 APP MDLTY 1+ULTRASOUND EA 15: CPT

## 2017-03-22 PROCEDURE — 97140 MANUAL THERAPY 1/> REGIONS: CPT

## 2017-03-22 PROCEDURE — 97110 THERAPEUTIC EXERCISES: CPT

## 2017-03-24 ENCOUNTER — TELEPHONE (OUTPATIENT)
Dept: RHEUMATOLOGY | Facility: CLINIC | Age: 62
End: 2017-03-24

## 2017-03-27 ENCOUNTER — OFFICE VISIT (OUTPATIENT)
Dept: RHEUMATOLOGY | Facility: CLINIC | Age: 62
End: 2017-03-27
Payer: COMMERCIAL

## 2017-03-27 VITALS
HEART RATE: 85 BPM | BODY MASS INDEX: 24.8 KG/M2 | DIASTOLIC BLOOD PRESSURE: 71 MMHG | HEIGHT: 78 IN | SYSTOLIC BLOOD PRESSURE: 113 MMHG | WEIGHT: 214.38 LBS

## 2017-03-27 DIAGNOSIS — Q87.40 MARFAN SYNDROME: Primary | ICD-10-CM

## 2017-03-27 DIAGNOSIS — M35.3 PMR (POLYMYALGIA RHEUMATICA): Chronic | ICD-10-CM

## 2017-03-27 PROCEDURE — 1160F RVW MEDS BY RX/DR IN RCRD: CPT | Mod: S$GLB,,, | Performed by: INTERNAL MEDICINE

## 2017-03-27 PROCEDURE — 99999 PR PBB SHADOW E&M-EST. PATIENT-LVL III: CPT | Mod: PBBFAC,,, | Performed by: INTERNAL MEDICINE

## 2017-03-27 PROCEDURE — 99214 OFFICE O/P EST MOD 30 MIN: CPT | Mod: S$GLB,,, | Performed by: INTERNAL MEDICINE

## 2017-03-27 ASSESSMENT — ROUTINE ASSESSMENT OF PATIENT INDEX DATA (RAPID3)
MDHAQ FUNCTION SCORE: 0
PATIENT GLOBAL ASSESSMENT SCORE: 2
WHEN YOU AWAKENED IN THE MORNING OVER THE LAST WEEK, PLEASE INDICATE THE AMOUNT OF TIME IT TAKES UNTIL YOU ARE AS LIMBER AS YOU WILL BE FOR THE DAY: 15 MIN
AM STIFFNESS SCORE: 1, YES
PAIN SCORE: 4
FATIGUE SCORE: 5.5
PSYCHOLOGICAL DISTRESS SCORE: 1.1
TOTAL RAPID3 SCORE: 2

## 2017-03-27 NOTE — PROGRESS NOTES
Subjective:       Patient ID: Mc Jeter is a 60 y.o. male.    Chief Complaint: Disease Management    HPI: 59 yo with Marfan's syndrome, left eye retinal detachment, DMII, atrial fibrillation on baby aspirin, glomerulonephritis (at age 14) here for evaluation of pain.  Reports at age 14, he had episode of glomerulonephritis (unclear what type), just required high dose prednisone.  He was on prednisone from age 14 to age 29 for his kidneys.  His last flare of kidney disease was in his 40s and it went away.    Diagnosed with Marfans when his daughter was diagnosed and the doctor told him he had MARFANS SYNDROME.   He had local cardiologist who gets yearly echos.  He is getting yearly eye exams.    In January, he started to have fatigue.  He gets up in the morning and feels like he has to nap all day.  He feels drained. Reports pain in both shoulders and both hips and also knees. Pain is worse in shoulders and hips.  Pain level is 3/10. Tylenol improves the pain.  Denies any depression.  Tiredness is improving this week.   Reports he snores. Denies any swelling or stiffness in joints.  He has been worked up by cardiologist for the fatigue and it was negative.  He denies any personal or family history of psoriasis.  Pain is pulling sensation. Denies any radiation. Moving makes pain worse.  Reports headaches off an on since January with sensation of tightness.      Mother and 2 sisters- RA  Daughter- Marfans syndrome      Interval history: He is on prednisone 2mg a day for second week.  Takes extra strength tylenol in morning with improvement.  He reports that tylenol improves hip pain.  Denies any shoulder pain.  Denies any rashes. Denies headaches or jaw claudication.  Denies any headaches or night sweats.    Past Medical History   Diagnosis Date    Glomerulonephritis     Marfan's syndrome     Cataract     Retinal detachment 3/19/12     Left eye    Diabetes mellitus     Atrial fibrillation        Review of  Systems   Constitutional: Negative for fever, chills, appetite change and fatigue.   HENT: Negative for hearing loss, mouth sores, rhinorrhea, sinus pressure and trouble swallowing.    Eyes: Negative for photophobia, pain, discharge, itching and visual disturbance.   Respiratory: Negative for cough, chest tightness, wheezing and stridor.    Cardiovascular: Negative for chest pain and palpitations.   Gastrointestinal: Negative for blood in stool and abdominal distention.   Endocrine: Negative for cold intolerance and heat intolerance.   Genitourinary: Negative for dysuria, hematuria and flank pain.   Musculoskeletal: Positive for myalgias and arthralgias. Negative for back pain, joint swelling, gait problem, neck pain and neck stiffness.   Skin: Negative for color change, pallor and rash.   Neurological: Negative for dizziness, light-headedness, numbness and headaches.   Hematological: Negative for adenopathy. Does not bruise/bleed easily.   Psychiatric/Behavioral: Negative for decreased concentration and agitation. The patient is not nervous/anxious.                Objective:        Physical Exam   Constitutional: He is oriented to person, place, and time. No distress.   HENT:   Head: Normocephalic and atraumatic.   Right Ear: External ear normal.   Eyes: Conjunctivae and EOM are normal. Pupils are equal, round, and reactive to light.   Neck: Normal range of motion. Neck supple. No JVD present. No tracheal deviation present. No thyromegaly present.   Cardiovascular: Normal rate, regular rhythm, normal heart sounds and intact distal pulses.  Exam reveals no gallop and no friction rub.    No murmur heard.  Pulmonary/Chest: Effort normal. No stridor. No respiratory distress. He has no wheezes. He has no rales.   Abdominal: Soft. Bowel sounds are normal. He exhibits no distension. There is no tenderness. There is no rebound.   Lymphadenopathy:     He has no cervical adenopathy.   Neurological: He is alert and oriented  to person, place, and time.   Skin: He is not diaphoretic.      musculoskeletal:     long extremities in comparison to the length of the trunk  Trouble rising from chair (RESOLVED)  Limited abduction of shoulders to 130 degrees (RESOLVED)  Elbows, hands, writs- no synovitis  Ankles- no synovitis,FROM  Feet- no synovitis, FROM      Alt-58  Creat-1.1  Esr,crp-wnl  ua-negative      Assessment:     62 yo M  with Marfan's syndrome, left eye retinal detachment, DMII, atrial fibrillation on baby aspirin, glomerulonephritis (at age 14) here for evaluation of pain.  He initially presented with shoulder and hip stiffness that resolved on prednisone. He did not have elevated inflammatory markers on presentation but responded classically like PMR.  He is on prednisone 2mg a day and doing well with addition of daily tylenol arthritis.  Of note, he has right hand with mild contractures. Have asked him to let me know if he has swelling.    1. PMR :  -After one month of being on prednisone 2mg a day, decrease to 1 mg a day for month and every other day for a dari and then stop    2. Marfans syndrome-no acute issues  -has outside cardiologist monitoring  - up to date with eye exam  -being evaluated by genetics    3. Right hand ?contractures:He is to let me know if he has swelling.He reports having this develop after wearing cast.      4. HM:dexa scan done on 3.22.2016 and showed risk of fracture at 2.6 percent; currently of fosamax          rtc in  8 weeks  **

## 2017-03-27 NOTE — MR AVS SNAPSHOT
[facility] - Rheumatology  [address]  [facility] [address]  Phone: [phone]  Fax: [phone]                  Mc Jeter   3/27/2017 2:30 PM   Office Visit    Description:  Male : 1955   Provider:  Hannah Woodard MD   Department:  [facility] - Rheumatology           Reason for Visit     Follow-up                To Do List           Future Appointments        Provider Department Dept Phone    3/29/2017 1:15 PM Dora Gregory OT [facility] [address] [phone]    4/10/2017 10:00 AM Moshe Cadena MD [facility] Genetics [phone]    2017 2:00 PM Hannah Woodard MD [facility] Rheumatology [phone]      Goals (5 Years of Data)     None      [facility] On Call     [facility] On Call Nurse [facility] -  Assistance  Registered nurses in the [facility] On Call Center provide clinical advisement, health education, appointment booking, and other advisory services.  Call for this free service at [phone].             Medications           Message regarding Medications     Verify the changes and/or additions to your medication regime listed below are the same as discussed with your clinician today.  If any of these changes or additions are incorrect, please notify your healthcare provider.             Verify that the below list of medications is an accurate representation of the medications you are currently taking.  If none reported, the list may be blank. If incorrect, please contact your healthcare provider. Carry this list with you in case of emergency.           Current Medications     alendronate (FOSAMAX) 70 MG tablet TAKE 1 TABLET (70 MG TOTAL) BY MOUTH EVERY 7 DAYS.    aspirin 81 MG Chew Take 81 mg by mouth once daily.    CONTOUR NEXT STRIPS Strp TEST BLOOD SUGAR TWICE A DAY    fosinopril (MONOPRIL) 20 MG tablet Take 1 tablet (20 mg total) by mouth once daily.    ketoconazole (NIZORAL) 2 % cream Apply topically 2 (two) times daily.    ketoconazole (NIZORAL) 2 %  "shampoo Use as body wash 3x/week - let sit for at least 5 minutes prior to rinsing    lancets (MICROLET LANCET) Misc 1 lancet by Misc.(Non-Drug; Combo Route) route 2 (two) times daily.    metformin (GLUCOPHAGE) 500 MG tablet Take 1 tablet (500 mg total) by mouth 2 (two) times daily with meals.    multivit,stress formula-zinc (STRESS FORMULA WITH ZINC) Tab Take by mouth.    omega-3 fatty acids-vitamin E (FISH OIL) 1,000 mg Cap Take 2 capsules by mouth once daily. 1 Capsule Oral Twice a day    ondansetron (ZOFRAN-ODT) 4 MG TbDL Take 1 tablet (4 mg total) by mouth every 8 (eight) hours as needed.    polycarbophil (FIBERCON) 625 mg tablet Take 2 tablets by mouth once daily.     predniSONE (DELTASONE) 1 MG tablet     VITAMIN D3 1,000 unit tablet TAKE 1 CAPSULE (1,000 UNITS TOTAL) BY MOUTH ONCE DAILY.    predniSONE (DELTASONE) 1 MG tablet TAKE 4 TABLETS (4 MG TOTAL) BY MOUTH ONCE DAILY.    predniSONE (DELTASONE) 5 MG tablet            Clinical Reference Information           Your Vitals Were     BP Pulse Height Weight BMI    113/71 (BP Location: Left arm, Patient Position: Sitting, BP Method: Automatic) 85 6' 6" (1.981 m) 97.3 kg (214 lb 6.4 oz) 24.78 kg/m2      Blood Pressure          Most Recent Value    BP  113/71      Allergies as of 3/27/2017     Penicillins      Immunizations Administered on Date of Encounter - 3/27/2017     None      Instructions    After one month of being on prednisone 2mg a day, decrease to 1 mg a day for month and every other day for a dari and then stop       Language Assistance Services     ATTENTION: Language assistance services are available, free of charge. Please call [phone].      ATENCIÓN: Si sydniela marianne, tiene a quiroz disposición servicios gratuitos de asistencia lingüística. Llame al [phone].     SINAI Ý: N?u b?n nói Ti?ng Vi?t, có các d?ch v? h? tr? ngôn ng? mi?n phí dành cho b?n. G?i s? [phone].         [facility] complies with applicable Federal civil " rights laws and does not discriminate on the basis of race, color, national origin, age, disability, or sex.

## 2017-03-27 NOTE — PATIENT INSTRUCTIONS
After one month of being on prednisone 2mg a day, decrease to 1 mg a day for month and every other day for a dari and then stop

## 2017-03-29 ENCOUNTER — CLINICAL SUPPORT (OUTPATIENT)
Dept: REHABILITATION | Facility: HOSPITAL | Age: 62
End: 2017-03-29
Payer: COMMERCIAL

## 2017-03-29 ENCOUNTER — PATIENT MESSAGE (OUTPATIENT)
Dept: INTERNAL MEDICINE | Facility: CLINIC | Age: 62
End: 2017-03-29

## 2017-03-29 DIAGNOSIS — M25.649 STIFFNESS OF THUMB JOINT: ICD-10-CM

## 2017-03-29 DIAGNOSIS — M19.049 CMC ARTHRITIS: Primary | ICD-10-CM

## 2017-03-29 DIAGNOSIS — M25.60 RANGE OF MOTION DEFICIT: ICD-10-CM

## 2017-03-29 DIAGNOSIS — M25.541 PAIN IN THUMB JOINT WITH MOVEMENT OF RIGHT HAND: ICD-10-CM

## 2017-03-29 DIAGNOSIS — R29.898 DECREASED PINCH STRENGTH: ICD-10-CM

## 2017-03-29 PROCEDURE — 97035 APP MDLTY 1+ULTRASOUND EA 15: CPT

## 2017-03-29 PROCEDURE — 97140 MANUAL THERAPY 1/> REGIONS: CPT

## 2017-03-29 PROCEDURE — 97110 THERAPEUTIC EXERCISES: CPT

## 2017-03-29 NOTE — PROGRESS NOTES
"OT Daily Progress Note    Patient:  Mc GAMBLE [facility] #:  [id number]   Date of Note: 3/29/2017  Referring Physician:  Carlene Camp, *  Diagnosis:  R CMC Arthroplasty, volar capsulodesis, EPL stabilization, 1st dorsal compartment release , trapezium excision 9/21/2016  Encounter Diagnoses   Name Primary?    Range of motion deficit     Decreased pinch strength     Stiffness of thumb joint     Pain in thumb joint with movement of right hand     CMC arthritis Yes      NO FOTO/ 20 VISITS   VISIT 12 OF 20      Start Time: 115  End Time: 2  Total Time: 45 min  Group Time: 0      Subjective:   "I was able to use a hammer a little better and I need to put in some fence posting."   Pain: 2- 3  out of 10     Objective:   Patient seen by OT this session. Treatment  consist of the following: Patient received paraffin bath to R  hand(s) for 10  minutes to increase blood flow, circulation, pain management and for tissue elasticity prior to therex.  Patient received ultrasound to  Thenar MP/IP joint and INDEX MP region to increase blood flow, circulation, tissue elasticity, pain management and for wound/scar management for 8 minutes @ 3 Mhz, Intensity 0.5  w/cm2 at 100%  duty cycle.    Performed MT including mobilization grade I-II  To wrist,  IP/MP of thumb and digits  followed by PROM to increase joint mobility/flexibility followed by therex including  Blocking FDP, FDS, thumb flexion, opposition to ring finger,  reverse blocking PIP, intrinsic +/-, composite fist, finger ab/ad,  circumduction, ab/ad x 10 reps and wrist flex, ext, RD, UD x 10 reps each.  Added 2# wrist flex, ext, RD, UD (gravity eliminated) , sup/pron x 10 reps each for UE strengthening. Performed  red  clothespin for key pinch and 3 pt pinch x 10 reps;    Upgraded to red  digi flex for isolated and composite finger flexion x 10 reps each.  Added 35 lbs./25lbs PHG for gross  x 10 reps each.    Reviewed HEP,  Patient reported good understanding " and use and HEP,modality use. Re-assessed 2/2/16:  RE-assessed 2/23/17.  Assessed /pinch 3/15, ROM assessed this day as follows:       5 lbs. (+2)   Key pinch 5.5 psi (+0.5)   3pt pinch 4 psi (+1)  2pt pinch 2 psi (=)    ROM:  Thumb / index/ middle / ring / small   MP  0/15  0/80 0/90 0/83  0/76  PIP 0/35 25/98 25/95 25/105 42/16   DIP ---  0/46  0/60  0/54 0/45     BENAVIDES  50  199 220 217 185   +31 +63 +75 +59 +34    Treatment: Paraffin x 10 min, Ultrasound x 8 min, Therapeutic exercises x 15 min and Manual therapy x 12  min     Assessment:  ROM continues to improve.   and pinch strength remain limited, but slight improvement noted.  Patient compliant with orthotic use, wear and care schedule.  PIP extension lags remain in index/small finger, but continues to improve.     Improved thumb mobility with opposition to ring finger and medial aspect of small finger.  Thumb IP/MP remain stiff and limited, but ROM overall improved.  Improved CMC ROM noted.   Wrist extension remains limited at end range.  Minimal muscle fatigue noted following therex.    Strength appears to be improving per report and tolerance.    Pt will continue to benefit from skilled OT intervention.   Patient is making good progress toward established goals.   Patient continues to demonstrate limitation  with  ROM, Joint mobility, Stiffness, Decreased fine motor coordination, Decreased strength, Continued pain and Scar adhesions.      Goals: (4 weeks)  Cont POC  1) Patient to be IND with HEP, orthotic use, wear and care schedule  and modalities for pain management  2) Increase ROM 3-5 degrees to increase functional hand use for ADLs/work/leisure activities--met   3) Assess  and pinch as able and set goals accordingly --met     Updated goals:   1)  Increase ROM 3-5 degrees to increase functional hand use for ADLs/work/leisure activities--met, continuing where needed   2)  Increase pinch 1-3 psi's for taking care of chicken and household  chores  3)  Increase  2-6 lbs. For grasping tools and performing home repairs     Plan:  Recommend continued OT  1-2x week x 4-8 weeks  during the certification period from   3/24/2017 to 5/24/2017  in pursuit of  OT goals.      I certify the need for these services furnished under the plan of treatment and while under my care.     ____________________________________________________________________  Physician/Referring Practitioner   Date of Signature

## 2017-03-30 NOTE — PROGRESS NOTES
"OT Daily Progress Note    Patient:  Mc GAMBLE [facility] #:  [id number]   Date of Note:  3/2/2017  Referring Physician:  Carlene Camp, *  Diagnosis:  R CMC Arthroplasty, volar capsulodesis, EPL stabilization, 1st dorsal compartment release , trapezium excision 9/21/2016  Encounter Diagnoses   Name Primary?    Range of motion deficit     Decreased pinch strength     Stiffness of thumb joint     Pain in thumb joint with movement of right hand     CMC arthritis Yes      NO FOTO/ 20 VISITS   VISIT 9 OF 12     Start Time: 115  End Time: 2  Total Time: 45 min  Group Time: 0      Subjective:   "I think its getting stronger, and my hand doesn't shake as much.  "   Pain: 3  out of 10     Objective:     Patient seen by OT this session. Treatment  consist of the following:   Patient received paraffin bath to R  hand(s) for 10  minutes to increase blood flow, circulation, pain management and for tissue elasticity prior to therex.  Patient received ultrasound to  Thenar MP/IP joint and INDEX MP region to increase blood flow, circulation, tissue elasticity, pain management and for wound/scar management for 8 minutes @ 3 Mhz, Intensity 0.5  w/cm2 at 100%  duty cycle.    Performed MT including mobilization grade I-II  To wrist,  IP/MP of thumb and digits  followed by PROM to increase joint mobility/flexibility followed by therex including  Blocking FDP, FDS, thumb flexion, opposition to ring finger,  reverse blocking PIP, intrinsic +/-, composite fist, finger ab/ad,  circumduction, ab/ad x 10 reps and wrist flex, ext, RD, UD x 10 reps each.  Added 2# wrist flex, ext, RD, UD (gravity eliminated) , sup/pron x 10 reps each for UE strengthening. Performed  red  clothespin for key pinch and 3 pt pinch x 10 reps;    Upgraded to red  digi flex for isolated and composite finger flexion x 10 reps each.  Added 35 lbs./25lbs PHG for gross  x 10 reps each.    Reviewed HEP,  Patient reported good understanding and use and " HEP,modality use. Re-assessed 2/2/16:  RE-assessed 2/23/17    ROM: THUMB / INDEX/ MIDDLE / RING /SMALL   MP   0/20   0/80   0/90   0/70   0/65   PIP  0/31  25/92 27/90 25/102 45/102   DIP  ----     0/50  0/55  0/54  0/48      BENAVIDES  51  197   208  201   170            +31       +61       +63   +43 +19     WRIST EXT/FLEX 25 / 65 (+15)       R) 3 LBS. L) 75 LBS   KEY PINCH R) 5 PSI   (+1)  L) 10 PSI   3PT PINCH R) 3 PSI L) 13.5 PSI   2PT PINCH R) 2 PSI L) 12 PSI       Treatment: Paraffin x 10 min, Ultrasound x 8 min, Therapeutic exercises x 15 min and Manual therapy x 12  min     Assessment:  ROM continues to improve.  Slight increase in key pinch, no significant change in  or 2/3pt pinch Patient compliant with orthotic use, wear and care schedule.  PIP extension lags remain in index/small finger, but continues to improve.     Improved thumb mobility with opposition to ring finger and medial aspect of small finger.  Thumb IP/MP remain stiff and limited, but ROM overall improved.  Improved CMC ROM noted.   Wrist extension remains limited at end range.  Muscle fatigue noted this day secondary to overuse reported.   Strength appears to be improving per report and tolerance.   and pinch strength remain limited.  Pt will continue to benefit from skilled OT intervention.   Patient is making good progress toward established goals.   Patient continues to demonstrate limitation  with  ROM, Joint mobility, Stiffness, Decreased fine motor coordination, Decreased strength, Continued pain and Scar adhesions.      Goals: (4 weeks)  Cont POC  1) Patient to be IND with HEP, orthotic use, wear and care schedule  and modalities for pain management  2) Increase ROM 3-5 degrees to increase functional hand use for ADLs/work/leisure activities--met   3) Assess  and pinch as able and set goals accordingly --met     Updated goals:   1)  Increase ROM 3-5 degrees to increase functional hand use for ADLs/work/leisure  activities  2)  Increase pinch 1-3 psi's for taking care of chicken and household chores  3)  Increase  2-6 lbs. For grasping tools and performing home repairs     Plan:  Continue 1-2x week x 6-8 weeks  during the certification period from   2/23/2017 to 4/23/2017   in pursuit of  OT goals.

## 2017-03-31 ENCOUNTER — OFFICE VISIT (OUTPATIENT)
Dept: INTERNAL MEDICINE | Facility: CLINIC | Age: 62
End: 2017-03-31
Payer: COMMERCIAL

## 2017-03-31 ENCOUNTER — LAB VISIT (OUTPATIENT)
Dept: LAB | Facility: HOSPITAL | Age: 62
End: 2017-03-31
Attending: INTERNAL MEDICINE
Payer: COMMERCIAL

## 2017-03-31 VITALS
DIASTOLIC BLOOD PRESSURE: 80 MMHG | BODY MASS INDEX: 24.87 KG/M2 | SYSTOLIC BLOOD PRESSURE: 134 MMHG | HEIGHT: 78 IN | TEMPERATURE: 98 F | WEIGHT: 214.94 LBS

## 2017-03-31 DIAGNOSIS — E11.9 TYPE 2 DIABETES MELLITUS WITHOUT COMPLICATION, WITHOUT LONG-TERM CURRENT USE OF INSULIN: ICD-10-CM

## 2017-03-31 DIAGNOSIS — L30.9 DERMATITIS: ICD-10-CM

## 2017-03-31 DIAGNOSIS — E11.9 TYPE 2 DIABETES MELLITUS WITHOUT COMPLICATION, WITHOUT LONG-TERM CURRENT USE OF INSULIN: Primary | ICD-10-CM

## 2017-03-31 PROCEDURE — 4010F ACE/ARB THERAPY RXD/TAKEN: CPT | Mod: S$GLB,,, | Performed by: PHYSICIAN ASSISTANT

## 2017-03-31 PROCEDURE — 99213 OFFICE O/P EST LOW 20 MIN: CPT | Mod: S$GLB,,, | Performed by: PHYSICIAN ASSISTANT

## 2017-03-31 PROCEDURE — 83036 HEMOGLOBIN GLYCOSYLATED A1C: CPT

## 2017-03-31 PROCEDURE — 1160F RVW MEDS BY RX/DR IN RCRD: CPT | Mod: S$GLB,,, | Performed by: PHYSICIAN ASSISTANT

## 2017-03-31 PROCEDURE — 3045F PR MOST RECENT HEMOGLOBIN A1C LEVEL 7.0-9.0%: CPT | Mod: S$GLB,,, | Performed by: PHYSICIAN ASSISTANT

## 2017-03-31 PROCEDURE — 99999 PR PBB SHADOW E&M-EST. PATIENT-LVL IV: CPT | Mod: PBBFAC,,, | Performed by: PHYSICIAN ASSISTANT

## 2017-03-31 PROCEDURE — 36415 COLL VENOUS BLD VENIPUNCTURE: CPT | Mod: PO

## 2017-03-31 RX ORDER — TRIAMCINOLONE ACETONIDE 1 MG/G
CREAM TOPICAL 2 TIMES DAILY
Qty: 80 G | Refills: 0 | Status: SHIPPED | OUTPATIENT
Start: 2017-03-31 | End: 2017-04-04 | Stop reason: SDUPTHER

## 2017-03-31 NOTE — MR AVS SNAPSHOT
[facility]  [address] [place] [address]  Phone: [phone]  Fax: [phone]                  Mc Jeter   3/31/2017 11:00 AM   Office Visit    Description:  Male : 1955   Provider:  Trudy Montemayor PA-C   [facility]           Reason for Visit     Diabetes     Rash           Diagnoses this Visit        Comments    Type 2 diabetes mellitus without complication, without long-term current use of insulin    -  Primary     Dermatitis                To Do List           Future Appointments        Provider Department Dept Phone    2017 1:15 PM Dora Gregory, OT [facility] [address] [phone]    4/10/2017 10:00 AM Moshe Cadena MD [facility] Genetics [phone]    2017 1:15 PM Dora Gregory, OT [facility] [address] [phone]    2017 2:00 PM Hannah Woodard MD [facility] Rheumatology [phone]    2017 9:10 AM Kaylin Vazquez MD [facility] Dermatology [phone]      Goals (5 Years of Data)     None      Follow-Up and Disposition     Return if symptoms worsen or fail to improve.       These Medications        Disp Refills Start End    triamcinolone acetonide 0.1% (KENALOG) 0.1 % cream 80 g 0 3/31/2017     Apply topically 2 (two) times daily. - Topical (Top)    Pharmacy: [facility] #[id number] - [place] - [address] [facility] Ph #: [phone]         [facility] On Call     [facility] On Call Nurse Care Line -  Assistance  Unless otherwise directed by your provider, please contact [facility], our nurse care line that is available for  assistance.     Registered nurses in the [facility] On Call Center provide: appointment scheduling, clinical advisement, health education, and other advisory services.  Call: [phone] (toll free)               Medications           Message regarding Medications     Verify the changes and/or additions to your medication  regime listed below are the same as discussed with your clinician today.  If any of these changes or additions are incorrect, please notify your healthcare provider.        START taking these NEW medications        Refills    triamcinolone acetonide 0.1% (KENALOG) 0.1 % cream 0    Sig: Apply topically 2 (two) times daily.    Class: Normal    Route: Topical (Top)      STOP taking these medications     ondansetron (ZOFRAN-ODT) 4 MG TbDL Take 1 tablet (4 mg total) by mouth every 8 (eight) hours as needed.           Verify that the below list of medications is an accurate representation of the medications you are currently taking.  If none reported, the list may be blank. If incorrect, please contact your healthcare provider. Carry this list with you in case of emergency.           Current Medications     alendronate (FOSAMAX) 70 MG tablet TAKE 1 TABLET (70 MG TOTAL) BY MOUTH EVERY 7 DAYS.    aspirin 81 MG Chew Take 81 mg by mouth once daily.    CONTOUR NEXT STRIPS Strp TEST BLOOD SUGAR TWICE A DAY    fosinopril (MONOPRIL) 20 MG tablet Take 1 tablet (20 mg total) by mouth once daily.    ketoconazole (NIZORAL) 2 % cream Apply topically 2 (two) times daily.    ketoconazole (NIZORAL) 2 % shampoo Use as body wash 3x/week - let sit for at least 5 minutes prior to rinsing    lancets (MICROLET LANCET) Misc 1 lancet by Misc.(Non-Drug; Combo Route) route 2 (two) times daily.    metformin (GLUCOPHAGE) 500 MG tablet Take 1 tablet (500 mg total) by mouth 2 (two) times daily with meals.    multivit,stress formula-zinc (STRESS FORMULA WITH ZINC) Tab Take by mouth.    omega-3 fatty acids-vitamin E (FISH OIL) 1,000 mg Cap Take 2 capsules by mouth once daily. 1 Capsule Oral Twice a day    polycarbophil (FIBERCON) 625 mg tablet Take 2 tablets by mouth once daily.     VITAMIN D3 1,000 unit tablet TAKE 1 CAPSULE (1,000 UNITS TOTAL) BY MOUTH ONCE DAILY.    predniSONE (DELTASONE) 1 MG tablet     predniSONE (DELTASONE) 1 MG tablet TAKE 4  "TABLETS (4 MG TOTAL) BY MOUTH ONCE DAILY.    predniSONE (DELTASONE) 5 MG tablet     triamcinolone acetonide 0.1% (KENALOG) 0.1 % cream Apply topically 2 (two) times daily.           Clinical Reference Information           Your Vitals Were     BP Temp Height Weight BMI    134/80 (BP Location: Left arm, Patient Position: Sitting, BP Method: Manual) 97.6 °F (36.4 °C) 6' 6" (1.981 m) 97.5 kg (214 lb 15.2 oz) 24.84 kg/m2      Blood Pressure          Most Recent Value    BP  134/80      Allergies as of 3/31/2017     Penicillins      Immunizations Administered on Date of Encounter - 3/31/2017     None      Orders Placed During Today's Visit      Normal Orders This Visit    Ambulatory referral to Dermatology     Future Labs/Procedures Expected by Expires    Hemoglobin A1c  3/31/2017 5/30/2018    Microalbumin/creatinine urine ratio  3/31/2017 5/30/2018      Instructions      Nonspecific Dermatitis  Dermatitis is a skin rash caused by something that touches the skin and makes it irritated and inflamed.  Your skin may be red, swollen, dry, and may be cracked. Blisters may form and ooze. The rash will itch.  Dermatitis can form on the face and neck, backs of hands, forearms, genitals, and lower legs. Dermatitis is not passed from person to person.  Talk with your health care provider about what may have caused the rash. Common things that cause skin allergies are metal in jewelry, plants like poison ivy or poison oak, and certain skin care products. You will need to avoid the source of your rash in the future to prevent it from coming back. In some cases, the cause of the dermatitis may not be found.  Treatment is done to relieve itching and prevent the rash from coming back. The rash should go away in a few days to a few weeks.  Home care  The health care provider may prescribe medications to relieve swelling and itching. Follow all instructions when using these medications.  · Avoid anything that heats up your skin, such as " hot showers or baths, or direct sunlight. This can make itching worse.  · Stay away from whatever you think caused the rash.  · Bathe in warm, not hot, water. Apply a moisturizing lotion after bathing to prevent dry skin.  · Avoid skin irritants such as wool or silk clothing, grease, oils, harsh soaps, and detergents.  · Apply cold compresses to soothe your sores to help relieve your symptoms. Do this for 30 minutes 3 to 4 times a day. You can make a cold compress by soaking a cloth in cold water. Squeeze out excess water. You can add colloidal oatmeal to the water to help reduce itching. For severe itching in a small area, apply an ice pack wrapped in a thin towel. Do this for 20 minutes 3 to 4 times a day.  · You can also help relieve large areas of itching by taking a lukewarm bath with colloidal oatmeal added to the water.  · Use hydrocortisone cream for redness and irritation, unless another medicine was prescribed. You can also use benzocaine anesthetic cream or spray.  · Use oral diphenhydramine to help reduce itching. This is an antihistamine you can buy at drug and grocery stores. It can make you sleepy, so use lower doses during the daytime. Or you can use loratadine. This is an antihistamine that will not make you sleepy. Dont use diphenhydramine if you have glaucoma or have trouble urinating because of an enlarged prostate.  · Wash your hands or use an antibacterial gel often to prevent the spread of the rash.  Follow-up care  Follow up with your health care provider. Make an appointment with your health care provider if your symptoms do not get better in the next 1 to 2 weeks.  When to seek medical advice  Call your health care provider right away if any of these occur:  · Spreading of the rash to other parts of your body  · Severe swelling of your face, eyelids, mouth, throat or tongue  · Trouble urinating due to swelling in the genital area  · Fever of 100.4°F (38°C) or higher  · Redness or swelling  that gets worse  · Pain that gets worse  · Foul-smelling fluid leaking from the skin  · Yellow-brown crusts on the open blisters  · Joint pain   Date Last Reviewed: 7/23/2014  © 0184-7497 [employer]. [address] [place] [address], [place] [address]. All rights reserved. This information is not intended as a substitute for professional medical care. Always follow your healthcare professional's instructions.             Language Assistance Services     ATTENTION: Language assistance services are available, free of charge. Please call [phone].      ATENCIÓN: Si minna lopes, tiene a quiroz disposición servicios gratuitos de asistencia lingüística. Llame al [phone].     CHÚ Ý: N?u b?n nói Ti?ng Vi?t, có các d?ch v? h? tr? ngôn ng? mi?n phí dành cho b?n. G?i s? [phone].         [facility] complies with applicable Federal civil rights laws and does not discriminate on the basis of race, color, national origin, age, disability, or sex.

## 2017-03-31 NOTE — PROGRESS NOTES
Subjective:      Patient ID: Mc Jeter is a 61 y.o. male.    Chief Complaint: Diabetes and Rash    HPI Comments: On tapering dose of prednisone for pmr    Diabetes   He presents for his follow-up diabetic visit. He has type 2 diabetes mellitus. His disease course has been worsening. There are no hypoglycemic associated symptoms. Pertinent negatives for hypoglycemia include no confusion, dizziness, headaches, nervousness/anxiousness, pallor or tremors. Pertinent negatives for diabetes include no blurred vision, no chest pain, no fatigue, no foot paresthesias, no foot ulcerations, no polydipsia, no polyphagia, no polyuria, no visual change, no weakness and no weight loss. There are no hypoglycemic complications. Symptoms are stable. He is compliant with treatment all of the time. His weight is stable. He is following a generally healthy diet. Exercise: yard work, has slacked off on going to the gym. He does not see a podiatrist.Eye exam is not current.   Rash   This is a new problem. The problem has been rapidly worsening since onset. Location: upper legs, waist, torso, arms. The rash is characterized by itchiness. He was exposed to nothing. Pertinent negatives include no anorexia, congestion, cough, diarrhea, eye pain, facial edema, fatigue, fever, joint pain, nail changes, rhinorrhea, shortness of breath, sore throat or vomiting. Treatments tried: ketaconazole  The treatment provided no relief. There is no history of asthma, eczema or varicella.       Review of Systems   Constitutional: Negative for activity change, appetite change, chills, diaphoresis, fatigue, fever, unexpected weight change and weight loss.   HENT: Negative.  Negative for congestion, hearing loss, postnasal drip, rhinorrhea, sore throat, trouble swallowing and voice change.    Eyes: Negative.  Negative for blurred vision, pain and visual disturbance.   Respiratory: Negative.  Negative for cough, choking, chest tightness and shortness of  "breath.    Cardiovascular: Negative for chest pain, palpitations and leg swelling.   Gastrointestinal: Negative for abdominal distention, abdominal pain, anorexia, blood in stool, constipation, diarrhea, nausea and vomiting.   Endocrine: Negative for cold intolerance, heat intolerance, polydipsia, polyphagia and polyuria.   Genitourinary: Negative.  Negative for difficulty urinating and frequency.   Musculoskeletal: Negative for arthralgias, back pain, gait problem, joint pain, joint swelling and myalgias.   Skin: Positive for rash. Negative for color change, nail changes, pallor and wound.   Neurological: Negative for dizziness, tremors, weakness, light-headedness, numbness and headaches.   Hematological: Negative for adenopathy.   Psychiatric/Behavioral: Negative for behavioral problems, confusion, self-injury, sleep disturbance and suicidal ideas. The patient is not nervous/anxious.      Objective:   /80 (BP Location: Left arm, Patient Position: Sitting, BP Method: Manual)  Temp 97.6 °F (36.4 °C)  Ht 6' 6" (1.981 m)  Wt 97.5 kg (214 lb 15.2 oz)  BMI 24.84 kg/m2    Physical Exam   Constitutional: He is oriented to person, place, and time. He appears well-developed and well-nourished.   HENT:   Head: Normocephalic and atraumatic.   Right Ear: External ear normal.   Left Ear: External ear normal.   Nose: Nose normal.   Mouth/Throat: Oropharynx is clear and moist.   Eyes: Conjunctivae and EOM are normal. Pupils are equal, round, and reactive to light.   Neck: Normal range of motion. Neck supple.   Cardiovascular: Normal rate and normal heart sounds.  An irregularly irregular rhythm present. Exam reveals no gallop and no friction rub.    No murmur heard.  Pulmonary/Chest: Effort normal and breath sounds normal. No respiratory distress. He has no wheezes. He has no rales. He exhibits no tenderness.   Abdominal: Soft. He exhibits no distension. There is no tenderness.   Musculoskeletal: Normal range of motion. "   Lymphadenopathy:     He has no cervical adenopathy.   Neurological: He is alert and oriented to person, place, and time.   Skin: Skin is warm and dry. Rash noted. Rash is urticarial.        Psychiatric: He has a normal mood and affect. His behavior is normal. Judgment and thought content normal.   Vitals reviewed.      Assessment:     1. Type 2 diabetes mellitus without complication, without long-term current use of insulin    2. Dermatitis      Plan:   Type 2 diabetes mellitus without complication, without long-term current use of insulin  -     Hemoglobin A1c; Future; Expected date: 3/31/17  -     Microalbumin/creatinine urine ratio; Future; Expected date: 3/31/17  -metformin dose adjustment based on results of A1C    Dermatitis  -     Ambulatory referral to Dermatology  -     triamcinolone acetonide 0.1% (KENALOG) 0.1 % cream; Apply topically 2 (two) times daily.  Dispense: 80 g; Refill: 0       -Educational handout on over-the-counter medications and at-home conservative care, pertinent to the patients diagnosis today, was handed to the patient and discussed in detail.    Return if symptoms worsen or fail to improve.

## 2017-03-31 NOTE — PATIENT INSTRUCTIONS
Nonspecific Dermatitis  Dermatitis is a skin rash caused by something that touches the skin and makes it irritated and inflamed.  Your skin may be red, swollen, dry, and may be cracked. Blisters may form and ooze. The rash will itch.  Dermatitis can form on the face and neck, backs of hands, forearms, genitals, and lower legs. Dermatitis is not passed from person to person.  Talk with your health care provider about what may have caused the rash. Common things that cause skin allergies are metal in jewelry, plants like poison ivy or poison oak, and certain skin care products. You will need to avoid the source of your rash in the future to prevent it from coming back. In some cases, the cause of the dermatitis may not be found.  Treatment is done to relieve itching and prevent the rash from coming back. The rash should go away in a few days to a few weeks.  Home care  The health care provider may prescribe medications to relieve swelling and itching. Follow all instructions when using these medications.  · Avoid anything that heats up your skin, such as hot showers or baths, or direct sunlight. This can make itching worse.  · Stay away from whatever you think caused the rash.  · Bathe in warm, not hot, water. Apply a moisturizing lotion after bathing to prevent dry skin.  · Avoid skin irritants such as wool or silk clothing, grease, oils, harsh soaps, and detergents.  · Apply cold compresses to soothe your sores to help relieve your symptoms. Do this for 30 minutes 3 to 4 times a day. You can make a cold compress by soaking a cloth in cold water. Squeeze out excess water. You can add colloidal oatmeal to the water to help reduce itching. For severe itching in a small area, apply an ice pack wrapped in a thin towel. Do this for 20 minutes 3 to 4 times a day.  · You can also help relieve large areas of itching by taking a lukewarm bath with colloidal oatmeal added to the water.  · Use hydrocortisone cream for redness  and irritation, unless another medicine was prescribed. You can also use benzocaine anesthetic cream or spray.  · Use oral diphenhydramine to help reduce itching. This is an antihistamine you can buy at drug and grocery stores. It can make you sleepy, so use lower doses during the daytime. Or you can use loratadine. This is an antihistamine that will not make you sleepy. Dont use diphenhydramine if you have glaucoma or have trouble urinating because of an enlarged prostate.  · Wash your hands or use an antibacterial gel often to prevent the spread of the rash.  Follow-up care  Follow up with your health care provider. Make an appointment with your health care provider if your symptoms do not get better in the next 1 to 2 weeks.  When to seek medical advice  Call your health care provider right away if any of these occur:  · Spreading of the rash to other parts of your body  · Severe swelling of your face, eyelids, mouth, throat or tongue  · Trouble urinating due to swelling in the genital area  · Fever of 100.4°F (38°C) or higher  · Redness or swelling that gets worse  · Pain that gets worse  · Foul-smelling fluid leaking from the skin  · Yellow-brown crusts on the open blisters  · Joint pain   Date Last Reviewed: 7/23/2014  © 9570-1044 [employer]. [address] [place] [address], [place] [address]. All rights reserved. This information is not intended as a substitute for professional medical care. Always follow your healthcare professional's instructions.

## 2017-04-02 LAB
ESTIMATED AVG GLUCOSE: 160 MG/DL
HBA1C MFR BLD HPLC: 7.2 %

## 2017-04-03 ENCOUNTER — TELEPHONE (OUTPATIENT)
Dept: INTERNAL MEDICINE | Facility: CLINIC | Age: 62
End: 2017-04-03

## 2017-04-03 ENCOUNTER — PATIENT MESSAGE (OUTPATIENT)
Dept: INTERNAL MEDICINE | Facility: CLINIC | Age: 62
End: 2017-04-03

## 2017-04-03 DIAGNOSIS — T50.905A HYPERGLYCEMIA, DRUG-INDUCED: Primary | ICD-10-CM

## 2017-04-03 DIAGNOSIS — R73.9 HYPERGLYCEMIA, DRUG-INDUCED: Primary | ICD-10-CM

## 2017-04-03 RX ORDER — METFORMIN HYDROCHLORIDE 850 MG/1
850 TABLET ORAL 2 TIMES DAILY WITH MEALS
Qty: 180 TABLET | Refills: 3 | Status: SHIPPED | OUTPATIENT
Start: 2017-04-03 | End: 2017-07-28

## 2017-04-03 NOTE — TELEPHONE ENCOUNTER
----- Message from Trudy Montemayor PA-C sent at 4/3/2017  9:04 AM CDT -----  a1C not at goal. Increase metformin to 850 twice daily. Sent to pharmacy

## 2017-04-03 NOTE — TELEPHONE ENCOUNTER
Patient was informed of his results via phone.  Pt. Verbalized understanding. Appointment mailed for 4 months.

## 2017-04-04 ENCOUNTER — CLINICAL SUPPORT (OUTPATIENT)
Dept: REHABILITATION | Facility: HOSPITAL | Age: 62
End: 2017-04-04
Attending: ORTHOPAEDIC SURGERY
Payer: COMMERCIAL

## 2017-04-04 ENCOUNTER — PATIENT MESSAGE (OUTPATIENT)
Dept: INTERNAL MEDICINE | Facility: CLINIC | Age: 62
End: 2017-04-04

## 2017-04-04 DIAGNOSIS — L30.9 DERMATITIS: ICD-10-CM

## 2017-04-04 DIAGNOSIS — R29.898 DECREASED PINCH STRENGTH: ICD-10-CM

## 2017-04-04 DIAGNOSIS — M25.649 STIFFNESS OF THUMB JOINT: ICD-10-CM

## 2017-04-04 DIAGNOSIS — M25.60 RANGE OF MOTION DEFICIT: ICD-10-CM

## 2017-04-04 DIAGNOSIS — M25.541 PAIN IN THUMB JOINT WITH MOVEMENT OF RIGHT HAND: ICD-10-CM

## 2017-04-04 PROCEDURE — 97140 MANUAL THERAPY 1/> REGIONS: CPT

## 2017-04-04 PROCEDURE — 97110 THERAPEUTIC EXERCISES: CPT

## 2017-04-04 PROCEDURE — 97035 APP MDLTY 1+ULTRASOUND EA 15: CPT

## 2017-04-04 RX ORDER — TRIAMCINOLONE ACETONIDE 1 MG/G
CREAM TOPICAL 2 TIMES DAILY
Qty: 80 G | Refills: 0 | Status: SHIPPED | OUTPATIENT
Start: 2017-04-04 | End: 2017-04-24

## 2017-04-04 NOTE — PROGRESS NOTES
"OT Daily Progress Note    Patient:  Mc GAMBLE [facility] #:  [id number]   Date of Note: 3/22/2017  Referring Physician:  Carlene Camp, *  Diagnosis:  R CMC Arthroplasty, volar capsulodesis, EPL stabilization, 1st dorsal compartment release , trapezium excision 9/21/2016  Encounter Diagnoses   Name Primary?    Range of motion deficit     Decreased pinch strength     Stiffness of thumb joint     Pain in thumb joint with movement of right hand     CMC arthritis Yes      NO FOTO/ 20 VISITS   VISIT 10 OF 12     Start Time: 115  End Time: 2  Total Time: 45 min  Group Time: 0      Subjective:   "I was able to turn the knob on my front door, which is hard, but I did it.  I think the  is coming along."   Pain: 2- 3  out of 10     Objective:   Patient seen by OT this session. Treatment  consist of the following: Patient received paraffin bath to R  hand(s) for 10  minutes to increase blood flow, circulation, pain management and for tissue elasticity prior to therex.  Patient received ultrasound to  Thenar MP/IP joint and INDEX MP region to increase blood flow, circulation, tissue elasticity, pain management and for wound/scar management for 8 minutes @ 3 Mhz, Intensity 0.5  w/cm2 at 100%  duty cycle.    Performed MT including mobilization grade I-II  To wrist,  IP/MP of thumb and digits  followed by PROM to increase joint mobility/flexibility followed by therex including  Blocking FDP, FDS, thumb flexion, opposition to ring finger,  reverse blocking PIP, intrinsic +/-, composite fist, finger ab/ad,  circumduction, ab/ad x 10 reps and wrist flex, ext, RD, UD x 10 reps each.  Added 2# wrist flex, ext, RD, UD (gravity eliminated) , sup/pron x 10 reps each for UE strengthening. Performed  red  clothespin for key pinch and 3 pt pinch x 10 reps;    Upgraded to red  digi flex for isolated and composite finger flexion x 10 reps each.  Added 35 lbs./25lbs PHG for gross  x 10 reps each.    Reviewed HEP,  Patient " reported good understanding and use and HEP,modality use. Re-assessed 2/2/16:  RE-assessed 2/23/17.  Assessed /pinch as follows:      5 lbs. (+2)   Key pinch 5.5 psi (+0.5)   3pt pinch 4 psi (+1)  2pt pinch 2 psi (=)      Treatment: Paraffin x 10 min, Ultrasound x 8 min, Therapeutic exercises x 15 min and Manual therapy x 12  min     Assessment:  ROM continues to improve.   and pinch strength remain limited, but slight improvement noted.  Patient compliant with orthotic use, wear and care schedule.  PIP extension lags remain in index/small finger, but continues to improve.     Improved thumb mobility with opposition to ring finger and medial aspect of small finger.  Thumb IP/MP remain stiff and limited, but ROM overall improved.  Improved CMC ROM noted.   Wrist extension remains limited at end range.  Minimal muscle fatigue noted following therex.    Strength appears to be improving per report and tolerance.    Pt will continue to benefit from skilled OT intervention.   Patient is making good progress toward established goals.   Patient continues to demonstrate limitation  with  ROM, Joint mobility, Stiffness, Decreased fine motor coordination, Decreased strength, Continued pain and Scar adhesions.      Goals: (4 weeks)  Cont POC  1) Patient to be IND with HEP, orthotic use, wear and care schedule  and modalities for pain management  2) Increase ROM 3-5 degrees to increase functional hand use for ADLs/work/leisure activities--met   3) Assess  and pinch as able and set goals accordingly --met     Updated goals:   1)  Increase ROM 3-5 degrees to increase functional hand use for ADLs/work/leisure activities  2)  Increase pinch 1-3 psi's for taking care of chicken and household chores  3)  Increase  2-6 lbs. For grasping tools and performing home repairs     Plan:  Continue 1-2x week x 6-8 weeks  during the certification period from   2/23/2017 to 4/23/2017   in pursuit of  OT goals.

## 2017-04-04 NOTE — PROGRESS NOTES
"OT Daily Progress Note    Patient:  cM GAMBLE [facility] #:  [id number]   Date of Note: 4/4/2017  Referring Physician:  Carlene Camp, *  Diagnosis:  R CMC Arthroplasty, volar capsulodesis, EPL stabilization, 1st dorsal compartment release , trapezium excision 9/21/2016  Encounter Diagnoses   Name Primary?    Range of motion deficit     Decreased pinch strength     Stiffness of thumb joint     Pain in thumb joint with movement of right hand       NO FOTO/ 20 VISITS   VISIT 13 OF 20      Start Time: 115  End Time: 2  Total Time: 45 min  Group Time: 0      Subjective:   "I didn't do the fence posting yet, but I can tell this morning it wasn't as stiff."    Pain: 2- 3  out of 10     Objective:   Patient seen by OT this session. Treatment  consist of the following: Patient received paraffin bath to R  hand(s) for 10  minutes to increase blood flow, circulation, pain management and for tissue elasticity prior to therex.  Patient received ultrasound to  Thenar MP/IP joint and INDEX MP region to increase blood flow, circulation, tissue elasticity, pain management and for wound/scar management for 8 minutes @ 3 Mhz, Intensity 0.5  w/cm2 at 100%  duty cycle.    Performed MT including mobilization grade I-II  To wrist,  IP/MP of thumb and digits  followed by PROM to increase joint mobility/flexibility followed by therex including  Blocking FDP, FDS, thumb flexion, opposition to ring finger,  reverse blocking PIP, intrinsic +/-, composite fist, finger ab/ad,  circumduction, ab/ad x 10 reps and wrist flex, ext, RD, UD x 10 reps each.  Added 3# wrist flex, ext, RD, UD (gravity eliminated) , sup/pron x 10 reps each for UE strengthening. Performed  Green  clothespin for key pinch and 3 pt pinch x 10 reps;    Upgraded to green digi flex for isolated and composite finger flexion x 10 reps each.  Added 55 lbs. PHG for gross  x 10 reps each.    Reviewed HEP,  Patient reported good understanding and use and HEP,modality " use. Re-assessed 2/2/16:  RE-assessed 2/23/17.  Assessed /pinch 3/15, ROM assessed 3/29 as  follows:       5 lbs. (+2)   Key pinch 5.5 psi (+0.5)   3pt pinch 4 psi (+1)  2pt pinch 2 psi (=)    ROM:  Thumb / index/ middle / ring / small   MP  0/15  0/80 0/90 0/83  0/76  PIP 0/35 25/98 25/95 25/105 42/16   DIP ---  0/46  0/60  0/54 0/45     BENAVIDES  50  199 220 217 185   +31 +63 +75 +59 +34    Treatment: Paraffin x 10 min, Ultrasound x 8 min, Therapeutic exercises x 15 min and Manual therapy x 12  min     Assessment:  ROM continues to improve.   and pinch strength remain limited, but slight improvement noted.  Patient compliant with orthotic use, wear and care schedule.  PIP extension lags remain in index/small finger, but continues to improve.     Improved thumb mobility with opposition to ring finger and medial aspect of small finger.  Thumb IP/MP remain stiff and limited, but ROM overall improved.  Improved CMC ROM noted.   Wrist extension remains limited at end range.  Minimal muscle fatigue noted following therex.    Strength appears to be improving per report and tolerance.    Pt will continue to benefit from skilled OT intervention.   Patient is making good progress toward established goals.   Patient continues to demonstrate limitation  with  ROM, Joint mobility, Stiffness, Decreased fine motor coordination, Decreased strength, Continued pain and Scar adhesions.      Goals: (4 weeks)  Cont POC  1) Patient to be IND with HEP, orthotic use, wear and care schedule  and modalities for pain management  2) Increase ROM 3-5 degrees to increase functional hand use for ADLs/work/leisure activities--met   3) Assess  and pinch as able and set goals accordingly --met     Updated goals:   1)  Increase ROM 3-5 degrees to increase functional hand use for ADLs/work/leisure activities--met, continuing where needed   2)  Increase pinch 1-3 psi's for taking care of chicken and household chores  3)  Increase  2-6  paige. For grasping tools and performing home repairs     Plan:  Recommend continued OT  1-2x week x 4-8 weeks  during the certification period from   3/24/2017 to 5/24/2017  in pursuit of  OT goals.

## 2017-04-04 NOTE — PLAN OF CARE
"OT Daily Progress Note    Patient:  Mc GAMBLE [facility] #:  [id number]   Date of Note: 3/29/2017  Referring Physician:  Carlene Camp, *  Diagnosis:  R CMC Arthroplasty, volar capsulodesis, EPL stabilization, 1st dorsal compartment release , trapezium excision 9/21/2016  Encounter Diagnoses   Name Primary?    Range of motion deficit     Decreased pinch strength     Stiffness of thumb joint     Pain in thumb joint with movement of right hand     CMC arthritis Yes      NO FOTO/ 20 VISITS   VISIT 12 OF 20      Start Time: 115  End Time: 2  Total Time: 45 min  Group Time: 0      Subjective:   "I was able to use a hammer a little better and I need to put in some fence posting."   Pain: 2- 3  out of 10     Objective:   Patient seen by OT this session. Treatment  consist of the following: Patient received paraffin bath to R  hand(s) for 10  minutes to increase blood flow, circulation, pain management and for tissue elasticity prior to therex.  Patient received ultrasound to  Thenar MP/IP joint and INDEX MP region to increase blood flow, circulation, tissue elasticity, pain management and for wound/scar management for 8 minutes @ 3 Mhz, Intensity 0.5  w/cm2 at 100%  duty cycle.    Performed MT including mobilization grade I-II  To wrist,  IP/MP of thumb and digits  followed by PROM to increase joint mobility/flexibility followed by therex including  Blocking FDP, FDS, thumb flexion, opposition to ring finger,  reverse blocking PIP, intrinsic +/-, composite fist, finger ab/ad,  circumduction, ab/ad x 10 reps and wrist flex, ext, RD, UD x 10 reps each.  Added 2# wrist flex, ext, RD, UD (gravity eliminated) , sup/pron x 10 reps each for UE strengthening. Performed  red  clothespin for key pinch and 3 pt pinch x 10 reps;    Upgraded to red  digi flex for isolated and composite finger flexion x 10 reps each.  Added 35 lbs./25lbs PHG for gross  x 10 reps each.    Reviewed HEP,  Patient reported good understanding " and use and HEP,modality use. Re-assessed 2/2/16:  RE-assessed 2/23/17.  Assessed /pinch 3/15, ROM assessed this day as follows:       5 lbs. (+2)   Key pinch 5.5 psi (+0.5)   3pt pinch 4 psi (+1)  2pt pinch 2 psi (=)    ROM:  Thumb / index/ middle / ring / small   MP  0/15  0/80 0/90 0/83  0/76  PIP 0/35 25/98 25/95 25/105 42/16   DIP ---  0/46  0/60  0/54 0/45     BENAVIDES  50  199 220 217 185   +31 +63 +75 +59 +34    Treatment: Paraffin x 10 min, Ultrasound x 8 min, Therapeutic exercises x 15 min and Manual therapy x 12  min     Assessment:  ROM continues to improve.   and pinch strength remain limited, but slight improvement noted.  Patient compliant with orthotic use, wear and care schedule.  PIP extension lags remain in index/small finger, but continues to improve.     Improved thumb mobility with opposition to ring finger and medial aspect of small finger.  Thumb IP/MP remain stiff and limited, but ROM overall improved.  Improved CMC ROM noted.   Wrist extension remains limited at end range.  Minimal muscle fatigue noted following therex.    Strength appears to be improving per report and tolerance.    Pt will continue to benefit from skilled OT intervention.   Patient is making good progress toward established goals.   Patient continues to demonstrate limitation  with  ROM, Joint mobility, Stiffness, Decreased fine motor coordination, Decreased strength, Continued pain and Scar adhesions.      Goals: (4 weeks)  Cont POC  1) Patient to be IND with HEP, orthotic use, wear and care schedule  and modalities for pain management  2) Increase ROM 3-5 degrees to increase functional hand use for ADLs/work/leisure activities--met   3) Assess  and pinch as able and set goals accordingly --met     Updated goals:   1)  Increase ROM 3-5 degrees to increase functional hand use for ADLs/work/leisure activities--met, continuing where needed   2)  Increase pinch 1-3 psi's for taking care of chicken and household  chores  3)  Increase  2-6 lbs. For grasping tools and performing home repairs     Plan:  Recommend continued OT  1-2x week x 4-8 weeks  during the certification period from   3/24/2017 to 5/24/2017  in pursuit of  OT goals.      I certify the need for these services furnished under the plan of treatment and while under my care.     ____________________________________________________________________  Physician/Referring Practitioner   Date of Signature

## 2017-04-05 ENCOUNTER — OFFICE VISIT (OUTPATIENT)
Dept: OPHTHALMOLOGY | Facility: CLINIC | Age: 62
End: 2017-04-05
Payer: COMMERCIAL

## 2017-04-05 DIAGNOSIS — H25.11 NUCLEAR SCLEROSIS, RIGHT: Primary | ICD-10-CM

## 2017-04-05 DIAGNOSIS — Z86.69 HISTORY OF RETINAL DETACHMENT: ICD-10-CM

## 2017-04-05 DIAGNOSIS — H52.4 PRESBYOPIA: ICD-10-CM

## 2017-04-05 DIAGNOSIS — Q87.40 MARFAN SYNDROME: ICD-10-CM

## 2017-04-05 DIAGNOSIS — Z96.1 PSEUDOPHAKIA OF LEFT EYE: ICD-10-CM

## 2017-04-05 DIAGNOSIS — E11.9 TYPE 2 DIABETES MELLITUS WITHOUT RETINOPATHY: ICD-10-CM

## 2017-04-05 DIAGNOSIS — H52.203 ASTIGMATISM OF BOTH EYES, UNSPECIFIED TYPE: ICD-10-CM

## 2017-04-05 PROCEDURE — 99999 PR PBB SHADOW E&M-EST. PATIENT-LVL I: CPT | Mod: PBBFAC,,, | Performed by: OPTOMETRIST

## 2017-04-05 PROCEDURE — 92015 DETERMINE REFRACTIVE STATE: CPT | Mod: S$GLB,,, | Performed by: OPTOMETRIST

## 2017-04-05 PROCEDURE — 92004 COMPRE OPH EXAM NEW PT 1/>: CPT | Mod: S$GLB,,, | Performed by: OPTOMETRIST

## 2017-04-05 NOTE — PROGRESS NOTES
HPI     Pt's last eye appt was with Dr. Cristobal 6/2/16. Pt was referred to us to   get a regular check up. Pt has Marfan's syndrone    HPI    Any vision changes since last exam: No  Eye pain: no  Other ocular symptoms: No    Do you wear currently wear glasses or contacts? Reading glasses    Interested in contacts today? No    Do you plan on getting new glasses today? Yes.       Last edited by Aroldo Marsh, Patient Care Assistant on 4/5/2017  9:21   AM.         Assessment /Plan     For exam results, see Encounter Report.    1. Nuclear sclerosis, right  Mild with good va, pt asymptomatic, slightly subluxed  Surgery is not indicated at this time. Monitor 12 months.     2. Pseudophakia of left eye  Stable, monitor 12 months       3. Type 2 diabetes mellitus without retinopathy  No diabetic retinopathy in either eye today. Reviewed importance of yearly dilated eye exams. Continue close care with PCP regarding diabetes.     4. History of retinal detachment  No signs of RD today  Recommended f/u with Dr Cristobal as recommended in June-July 2017       5. Marfan syndrome     6. Astigmatism of both eyes, unspecified type  Eyeglass Final Rx     Eyeglass Final Rx      Sphere Cylinder Axis   Right [place]andon +1.75 175   Left [place]andon +2.75 003       Type:  SVL    Expiration Date:  4/6/2018    Comments:  Distance only      Eyeglass Final Rx #2      Sphere Cylinder Axis   Right +1.75 +1.75 175   Left +1.75 +2.75 003       Type:  SVL    Expiration Date:  4/6/2018    Comments:  Reading only                 7. Presbyopia         RTC 1 yr for dilated eye exam or PRN if any problems.   Discussed above and answered questions.

## 2017-04-09 ENCOUNTER — OFFICE VISIT (OUTPATIENT)
Dept: URGENT CARE | Facility: CLINIC | Age: 62
End: 2017-04-09
Payer: COMMERCIAL

## 2017-04-09 ENCOUNTER — PATIENT MESSAGE (OUTPATIENT)
Dept: PODIATRY | Facility: CLINIC | Age: 62
End: 2017-04-09

## 2017-04-09 VITALS
HEIGHT: 78 IN | TEMPERATURE: 99 F | OXYGEN SATURATION: 100 % | BODY MASS INDEX: 25.33 KG/M2 | WEIGHT: 218.94 LBS | DIASTOLIC BLOOD PRESSURE: 68 MMHG | SYSTOLIC BLOOD PRESSURE: 138 MMHG | HEART RATE: 78 BPM

## 2017-04-09 DIAGNOSIS — L08.9 DIABETIC FOOT INFECTION: ICD-10-CM

## 2017-04-09 DIAGNOSIS — L03.116 CELLULITIS OF LEFT FOOT: Primary | ICD-10-CM

## 2017-04-09 DIAGNOSIS — E11.628 DIABETIC FOOT INFECTION: ICD-10-CM

## 2017-04-09 PROCEDURE — 99214 OFFICE O/P EST MOD 30 MIN: CPT | Mod: S$GLB,,, | Performed by: NURSE PRACTITIONER

## 2017-04-09 PROCEDURE — 99999 PR PBB SHADOW E&M-EST. PATIENT-LVL V: CPT | Mod: PBBFAC,,, | Performed by: NURSE PRACTITIONER

## 2017-04-09 PROCEDURE — 4010F ACE/ARB THERAPY RXD/TAKEN: CPT | Mod: S$GLB,,, | Performed by: NURSE PRACTITIONER

## 2017-04-09 PROCEDURE — 3045F PR MOST RECENT HEMOGLOBIN A1C LEVEL 7.0-9.0%: CPT | Mod: S$GLB,,, | Performed by: NURSE PRACTITIONER

## 2017-04-09 PROCEDURE — 1160F RVW MEDS BY RX/DR IN RCRD: CPT | Mod: S$GLB,,, | Performed by: NURSE PRACTITIONER

## 2017-04-09 RX ORDER — CLINDAMYCIN HYDROCHLORIDE 300 MG/1
300 CAPSULE ORAL 4 TIMES DAILY
Qty: 40 CAPSULE | Refills: 0 | Status: SHIPPED | OUTPATIENT
Start: 2017-04-09 | End: 2017-04-19

## 2017-04-09 NOTE — PROGRESS NOTES
Subjective:       Patient ID: Mc Jeter is a 61 y.o. male.    Chief Complaint: Foot Pain    HPI   Mr Jeter presents with complaints of left toe and foot pain, redness, and swelling. There is no drainage. The symptoms started yesterday and have rapidly worsened. He has had issues with his feet in the past and lost one of his toes on the right foot.  Review of Systems   Constitutional: Negative for chills and fever.   HENT: Negative for congestion and sore throat.    Respiratory: Negative for cough and shortness of breath.    Gastrointestinal: Negative for nausea and vomiting.   Musculoskeletal: Positive for joint swelling.   Skin: Positive for color change.   Psychiatric/Behavioral: Negative for behavioral problems and confusion.       Objective:      Physical Exam   Constitutional: He is oriented to person, place, and time. He appears well-developed and well-nourished.   Eyes: Conjunctivae are normal.   Neck: Normal range of motion.   Cardiovascular: Normal rate and regular rhythm.    Pulmonary/Chest: Effort normal and breath sounds normal. No respiratory distress.   Musculoskeletal: Normal range of motion. He exhibits edema and tenderness.   Neurological: He is alert and oriented to person, place, and time.   Skin: Skin is warm and dry.   The second toe on the left foot has erythema and swelling in the joint extending up to the mid foot. The area is warm and TTP. The bottom of the toe is blackened appearing to have areas of necrosis. See photo.     Psychiatric: He has a normal mood and affect. His behavior is normal.   Vitals reviewed.                Assessment:       1. Cellulitis of left foot    2. Diabetic foot infection        Plan:   Mc was seen today for foot pain.    Diagnoses and all orders for this visit:    Cellulitis of left foot  -     clindamycin (CLEOCIN) 300 MG capsule; Take 1 capsule (300 mg total) by mouth 4 (four) times daily.  -     Ambulatory referral to Podiatry    Diabetic foot  infection    He will contact his podiatrist in the AM. If symptoms worsen or fail to improve, follow-up with primary care doctor/ podiatry or nearest ER. Red flag symptoms for ER visit discussed. After visit summary given and discussed. Patient verbalized understanding and agrees with treatment plan. Patient remained stable and was discharged in no acute distress.

## 2017-04-09 NOTE — PATIENT INSTRUCTIONS
Cellulitis  Cellulitis is an infection of the deep layers of skin. A break in the skin, such as a cut or scratch, can let bacteria under the skin. If the bacteria get to deep layers of the skin, it can be serious. If not treated, cellulitis can get into the bloodstream and lymph nodes. The infection can then spread throughout the body. This causes serious illness.  Cellulitis causes the affected skin to become red, swollen, warm, and sore. The reddened areas have a visible border. An open sore may leak fluid (pus). You may have a fever, chills, and pain.  Cellulitis is treated with antibiotics taken for 7 to 10 days. An open sore may be cleaned and covered with cool wet gauze. Symptoms should get better 1 to 2 days after treatment is started. Make sure to take all the antibiotics for the full number of days until they are gone. Keep taking the medicine even if your symptoms go away.  Home care  Follow these tips:  · Limit the use of the part of your body with cellulitis.   · If the infection is on your leg, keep your leg raised while sitting. This will help to reduce swelling.  · Take all of the antibiotic medicine exactly as directed until it is gone. Do not miss any doses, especially during the first 7 days. Dont stop taking the medicine when your symptoms get better.  · Keep the affected area clean and dry.  · Wash your hands with soap and warm water before and after touching your skin. Anyone else who touches your skin should also wash his or her hands. Don't share towels.  Follow-up care  Follow up with your healthcare provider, or as advised. If your infection does not go away on the first antibiotic, your healthcare provider will prescribe a different one.  When to seek medical advice  Call your healthcare provider right away if any of these occur:  · Red areas that spread  · Swelling or pain that gets worse  · Fluid leaking from the skin (pus)  · Fever higher of 100.4º F (38.0º C) or higher after 2 days  on antibiotics  Date Last Reviewed: 9/1/2016  © 0181-7874 [employer]. [address] [place] [address], [place] [address]. All rights reserved. This information is not intended as a substitute for professional medical care. Always follow your healthcare professional's instructions.

## 2017-04-09 NOTE — MR AVS SNAPSHOT
[facility]  [address] [place] [place] [address]  Phone: [phone]  Fax: [phone]                  Mc Jeter   2017 11:30 AM   Office Visit    Description:  Male : 1955   Provider:  HYACINTH Shaw   Department:  [facility]           Reason for Visit     Foot Pain           Diagnoses this Visit        Comments    Cellulitis of left foot    -  Primary     Diabetic foot infection                To Do List           Future Appointments        Provider Department Dept Phone    4/10/2017 10:00 AM MD Jordan Santiago [facility] - Genetics [phone]    2017 10:00 AM Lurdes Herman MD [facility] - Dermatology [phone]    2017 1:15 PM Dora Gregory OT [facility] [address] [phone]    2017 1:00 PM Isiah Ontiveros DPM [facility] - Podiatry [phone]    2017 2:00 PM Hannah Woodard MD [facility] - Rheumatology [phone]      Goals (5 Years of Data)     None       These Medications        Disp Refills Start End    clindamycin (CLEOCIN) 300 MG capsule 40 capsule 0 2017    Take 1 capsule (300 mg total) by mouth 4 (four) times daily. - Oral    Pharmacy: [facility]/pharmacy #[id number] - [place] - [address] AT [facility] #: [phone]         [facility]     [facility] -  Assistance  Unless otherwise directed by your provider, please contact [facility], our nurse care line that is available for  assistance.     Registered nurses in the [facility] provide: appointment scheduling, clinical advisement, health education, and other advisory services.  Call: [phone] (toll free)               Medications           Message regarding Medications     Verify the changes and/or additions to your medication regime listed below are the same as discussed with your clinician today.  If any of these changes or additions are incorrect, please notify  your healthcare provider.        START taking these NEW medications        Refills    clindamycin (CLEOCIN) 300 MG capsule 0    Sig: Take 1 capsule (300 mg total) by mouth 4 (four) times daily.    Class: Normal    Route: Oral           Verify that the below list of medications is an accurate representation of the medications you are currently taking.  If none reported, the list may be blank. If incorrect, please contact your healthcare provider. Carry this list with you in case of emergency.           Current Medications     alendronate (FOSAMAX) 70 MG tablet TAKE 1 TABLET (70 MG TOTAL) BY MOUTH EVERY 7 DAYS.    aspirin 81 MG Chew Take 81 mg by mouth once daily.    CONTOUR NEXT STRIPS Strp TEST BLOOD SUGAR TWICE A DAY    fosinopril (MONOPRIL) 20 MG tablet Take 1 tablet (20 mg total) by mouth once daily.    ketoconazole (NIZORAL) 2 % cream Apply topically 2 (two) times daily.    ketoconazole (NIZORAL) 2 % shampoo Use as body wash 3x/week - let sit for at least 5 minutes prior to rinsing    lancets (MICROLET LANCET) Misc 1 lancet by Misc.(Non-Drug; Combo Route) route 2 (two) times daily.    metformin (GLUCOPHAGE) 850 MG tablet Take 1 tablet (850 mg total) by mouth 2 (two) times daily with meals.    multivit,stress formula-zinc (STRESS FORMULA WITH ZINC) Tab Take by mouth.    omega-3 fatty acids-vitamin E (FISH OIL) 1,000 mg Cap Take 2 capsules by mouth once daily. 1 Capsule Oral Twice a day    polycarbophil (FIBERCON) 625 mg tablet Take 2 tablets by mouth once daily.     predniSONE (DELTASONE) 1 MG tablet     predniSONE (DELTASONE) 1 MG tablet TAKE 4 TABLETS (4 MG TOTAL) BY MOUTH ONCE DAILY.    predniSONE (DELTASONE) 5 MG tablet     triamcinolone acetonide 0.1% (KENALOG) 0.1 % cream Apply topically 2 (two) times daily.    VITAMIN D3 1,000 unit tablet TAKE 1 CAPSULE (1,000 UNITS TOTAL) BY MOUTH ONCE DAILY.    clindamycin (CLEOCIN) 300 MG capsule Take 1 capsule (300 mg total) by mouth 4 (four) times daily.          "  Clinical Reference Information           Your Vitals Were     BP Pulse Temp Height Weight SpO2    138/68 78 98.6 °F (37 °C) 6' 5.5" (1.969 m) 99.3 kg (218 lb 14.7 oz) 100%    BMI                25.63 kg/m2          Blood Pressure          Most Recent Value    BP  138/68      Allergies as of 4/9/2017     Penicillins      Immunizations Administered on Date of Encounter - 4/9/2017     None      Orders Placed During Today's Visit      Normal Orders This Visit    Ambulatory referral to Podiatry       Instructions      Cellulitis  Cellulitis is an infection of the deep layers of skin. A break in the skin, such as a cut or scratch, can let bacteria under the skin. If the bacteria get to deep layers of the skin, it can be serious. If not treated, cellulitis can get into the bloodstream and lymph nodes. The infection can then spread throughout the body. This causes serious illness.  Cellulitis causes the affected skin to become red, swollen, warm, and sore. The reddened areas have a visible border. An open sore may leak fluid (pus). You may have a fever, chills, and pain.  Cellulitis is treated with antibiotics taken for 7 to 10 days. An open sore may be cleaned and covered with cool wet gauze. Symptoms should get better 1 to 2 days after treatment is started. Make sure to take all the antibiotics for the full number of days until they are gone. Keep taking the medicine even if your symptoms go away.  Home care  Follow these tips:  · Limit the use of the part of your body with cellulitis.   · If the infection is on your leg, keep your leg raised while sitting. This will help to reduce swelling.  · Take all of the antibiotic medicine exactly as directed until it is gone. Do not miss any doses, especially during the first 7 days. Dont stop taking the medicine when your symptoms get better.  · Keep the affected area clean and dry.  · Wash your hands with soap and warm water before and after touching your skin. Anyone else " who touches your skin should also wash his or her hands. Don't share towels.  Follow-up care  Follow up with your healthcare provider, or as advised. If your infection does not go away on the first antibiotic, your healthcare provider will prescribe a different one.  When to seek medical advice  Call your healthcare provider right away if any of these occur:  · Red areas that spread  · Swelling or pain that gets worse  · Fluid leaking from the skin (pus)  · Fever higher of 100.4º F (38.0º C) or higher after 2 days on antibiotics  Date Last Reviewed: 9/1/2016  © 0941-2136 [employer]. [address] [place] [address], [place] [address]. All rights reserved. This information is not intended as a substitute for professional medical care. Always follow your healthcare professional's instructions.             Language Assistance Services     ATTENTION: Language assistance services are available, free of charge. Please call [phone].      ATENCIÓN: Si habla addisonañol, tiene a quiroz disposición servicios gratuitos de asistencia lingüística. Llame al [phone].     CHÚ Ý: N?u b?n nói Ti?ng Vi?t, có các d?ch v? h? tr? ngôn ng? mi?n phí dành cho b?n. G?i s? [phone].         [facility] complies with applicable Federal civil rights laws and does not discriminate on the basis of race, color, national origin, age, disability, or sex.

## 2017-04-10 ENCOUNTER — OFFICE VISIT (OUTPATIENT)
Dept: GENETICS | Facility: CLINIC | Age: 62
End: 2017-04-10
Payer: COMMERCIAL

## 2017-04-10 VITALS — HEIGHT: 78 IN | WEIGHT: 218.94 LBS | BODY MASS INDEX: 25.33 KG/M2

## 2017-04-10 DIAGNOSIS — I77.810 ASCENDING AORTA DILATATION: ICD-10-CM

## 2017-04-10 DIAGNOSIS — M19.049 CMC ARTHRITIS: ICD-10-CM

## 2017-04-10 DIAGNOSIS — H27.119: ICD-10-CM

## 2017-04-10 DIAGNOSIS — Q87.40 MARFAN SYNDROME: Primary | ICD-10-CM

## 2017-04-10 PROCEDURE — 99999 PR PBB SHADOW E&M-EST. PATIENT-LVL III: CPT | Mod: PBBFAC,,, | Performed by: MEDICAL GENETICS

## 2017-04-10 PROCEDURE — 99215 OFFICE O/P EST HI 40 MIN: CPT | Mod: S$GLB,,, | Performed by: MEDICAL GENETICS

## 2017-04-10 PROCEDURE — 1160F RVW MEDS BY RX/DR IN RCRD: CPT | Mod: S$GLB,,, | Performed by: MEDICAL GENETICS

## 2017-04-11 NOTE — PROGRESS NOTES
Mc Jeter   DOS: 4/10/17  : [birth year]  MRN: [id number]     REFERRING MD: Adrien Adams MD     DIAGNOSIS: Marfan syndrome     REASON FOR CONSULT: We have seen this 61-year-old  male for possible Marfan syndrome. The patients daughter ([id number]) is known to our service. At the initial visit, I discussed with the patient that he did meet clinical criteria for Marfan syndrome (systemic score of 7, dilated aorta and ectopia lentis) and analysis of the FBN1 gene was recommended. This testing identified a pathogenic mutation (c.6675 T>G [id number]) in the FBN1 confirming the diagnosis of Marfan syndrome in the patient. Mr. Jeter returns to discuss these results and our recommendations.    HISTORY OF PRESENT ILLNESS: Mr. Jeter has a history of tall stature, joint laxity, pectus carinatum, scoliosis, pes planus, high myopia (s/p Lasik procedure), retinal detachment of the left eye with ectopia lentis, dilated aortic root and atrial fibrillation. Marfan syndrome was suspected about 15-20 years ago when he presented to an ophthalmologist in [place] to be evaluated for Lasik. The patient is following with an [facility] and an outside cardiologist yearly. He reports that his aortic root is in the upper limits of normal. Reports of past ECHO were not available for our review. He is on Aspirin and on activity restrictions (the patient has been noncompliant).      MEDICATIONS: alendronate (FOSAMAX) 70 MG tablet    aspirin 81 MG Chew     CONTOUR NEXT STRIPS Strp     fosinopril (MONOPRIL) 20 MG tablet   ketoconazole (NIZORAL) 2 % cream   ketoconazole (NIZORAL) 2 % shampoo     lancets (MICROLET LANCET) Misc   metformin (GLUCOPHAGE) 500 MG tablet     multivit,stress formula-zinc (STRESS FORMULA WITH ZINC) Tab     omega-3 fatty acids-vitamin E (FISH OIL) 1,000 mg Cap     ondansetron (ZOFRAN-ODT) 4 MG TbDL     polycarbophil (FIBERCON) 625 mg tablet     predniSONE (DELTASONE) 1 MG tablet     predniSONE  "(DELTASONE) 5 MG tablet     VITAMIN D3 1,000 unit tablet      Allergies: PCN    FAMILY HISTORY: At this visit, a 3 generation pedigree scanned in the patients chart was reviewed. The patient has 4 daughters. His youngest, Nancy, is 11-years-old and was diagnosed clinically with Marfan syndrome 3 years ago by Dr Duane Superneau. She has a history of long wing span, flat feet, joint laxity and dilated aorta. His 29-year-old daughter Mili ([id number]) was evaluated by our service. At the time, she did not meet the clinical criteria for a diagnosis of Marfan syndrome but was borderline on systemic score (6 points). Mr. Jeter does not believe his two other daughters have Marfan. The patient reported that his two brothers are about 6 feet tall. The patients mother was 56 and has a history of myopia and detached retina. The patients father was 58. There is no family history of sudden/ unexplained death. Maternal ancestry is [place] and English. Paternal ancestry is .      PHYSICAL EXAM: Wt: 99.3 kg, Ht: 6' 5.5", BMI: 25.63 kg/m2  HEENT: Mr. Jeter is normocephalic and has long and narrow face with a mild malar hypoplasia. He has a high arched palate and dental crowding.  NECK: Supple.   HEART: Regular rate and rhythm.   ABDOMEN: soft   MUSCULOSKELETAL: Mr. Jeter had positive wrist and thumb signs. He did have long fingers but no arachnodactyly. Her arm span to height ratio was 1.0 and upper to lower segment ratio of 0.95. He had pes planus. His systemic score was 7 (minimum 7 is needed to satisfy a major Marfan criterion) and 1/9 points on Beighton scale (>5 defines hyperextensibility).  SKIN: striae in the back.  NEUROLOGIC: Normal tone and strength. Mr. Jeter had normal speech and cognition and was alert and oriented x3.     IMPRESSION: At this time, we have reviewed the patients test results which identified a pathogenic mutation in the FBN1 gene (c.6675 T>G [id number]).  This confirms the diagnosis of " Marfan syndrome in Mr. Jeter.  Marfan syndrome is an autosomal dominant disorder of connective tissue which results from abnormal function of fibrillin protein which is mainly caused by mutations in the FBN1 gene.     We had an extensive discussion regarding the features of this connective tissue disorder. Marfan syndrome affects ocular, skeletal, and cardiovascular systems. Individuals may have variable signs and symptoms including tall stature, arachnodactyly, hyperextensibility, aortic dilation, lens subluxation, pectus carinatum/excavatum, scoliosis, spontaneous pneumothorax and lumbosacral dural ectasia. We discussed that each of Mr. Adames children and siblings would have a 1 in 2 or 50% chance of having the same mutation. Testing is important to determine who in the family needs increased cardiac screening.     Mr. Jeter needs to have regular cardiology follow-ups with monitoring of the ascending aortic diameter and treatment with losartan, an AT1 antagonist, which is approved for preventive Marfan treatment. He also needs regular ophthalmologic examination. Joint hyperextension, resistance/isometric exercise, lifting heavy objects and high-impact activity such as contact sports should be avoided. Recreational swimming, jogging, biking and golf are more preferable. If objects need to be lifted from the ground, knees should be bent to grab the object and raise the body with the object, as opposed to lifting without bending the knees. The back is at a high risk for complications. We have provided contact information to the National Marfan Foundation (NMF). Mr. Jeter expressed understanding of the information discussed.     RECOMMENDATIONS:   1. Test children and grandchildren, especially his daughters Tong.  2. Cardiology follow-up for management of the cardiac manifestations of Marfan syndrome.  3. Consider replacing Monopril with Losartan.  4. Ophthalmology follow-up for further evaluation and  management of ophthalmologic findings that is characteristic of Marfan syndrome.  5. Hearing test and evaluation of balance as needed.  6. Precautions discussed as above.   7. Scoliosis screening by ortho.     REFERENCE:  Andrew WARD. Marfan Syndrome. 2001 Apr 18 [Updated 2014 Jun 12]. In: Ghazala RA, Colton MP, Denton HH, et al., editors. [employer]® [Internet]. [place]): [facility]; 8303-5466. Available from: https://www.ncbi.nlm.nih.gov/books/QEL2364/    TIME SPENT: 60 minutes, >50% counseling. The note is in epic.     Moshe Cadena M.D.                                                                    Susanna Norton MS  Section Head - Medical Genetics                                                 Genetic Counselor           [facility]

## 2017-04-12 ENCOUNTER — HOSPITAL ENCOUNTER (OUTPATIENT)
Dept: RADIOLOGY | Facility: HOSPITAL | Age: 62
Discharge: HOME | End: 2017-04-12
Attending: PODIATRIST
Payer: COMMERCIAL

## 2017-04-12 ENCOUNTER — OFFICE VISIT (OUTPATIENT)
Dept: PODIATRY | Facility: CLINIC | Age: 62
End: 2017-04-12
Payer: COMMERCIAL

## 2017-04-12 VITALS
SYSTOLIC BLOOD PRESSURE: 104 MMHG | BODY MASS INDEX: 24.83 KG/M2 | WEIGHT: 210.31 LBS | DIASTOLIC BLOOD PRESSURE: 69 MMHG | HEIGHT: 77 IN | HEART RATE: 96 BPM

## 2017-04-12 DIAGNOSIS — L97.522 TOE ULCER, LEFT, WITH FAT LAYER EXPOSED: ICD-10-CM

## 2017-04-12 DIAGNOSIS — L97.522 TOE ULCER, LEFT, WITH FAT LAYER EXPOSED: Primary | ICD-10-CM

## 2017-04-12 DIAGNOSIS — L97.909: ICD-10-CM

## 2017-04-12 DIAGNOSIS — E11.622: ICD-10-CM

## 2017-04-12 PROCEDURE — 99999 PR PBB SHADOW E&M-EST. PATIENT-LVL III: CPT | Mod: PBBFAC,,, | Performed by: PODIATRIST

## 2017-04-12 PROCEDURE — 73630 X-RAY EXAM OF FOOT: CPT | Mod: TC,PO,LT

## 2017-04-12 PROCEDURE — 11042 DBRDMT SUBQ TIS 1ST 20SQCM/<: CPT | Mod: S$GLB,,, | Performed by: PODIATRIST

## 2017-04-12 PROCEDURE — 73630 X-RAY EXAM OF FOOT: CPT | Mod: 26,LT,, | Performed by: RADIOLOGY

## 2017-04-12 PROCEDURE — 4010F ACE/ARB THERAPY RXD/TAKEN: CPT | Mod: S$GLB,,, | Performed by: PODIATRIST

## 2017-04-12 PROCEDURE — 99214 OFFICE O/P EST MOD 30 MIN: CPT | Mod: 25,S$GLB,, | Performed by: PODIATRIST

## 2017-04-12 PROCEDURE — 1160F RVW MEDS BY RX/DR IN RCRD: CPT | Mod: S$GLB,,, | Performed by: PODIATRIST

## 2017-04-12 PROCEDURE — 3045F PR MOST RECENT HEMOGLOBIN A1C LEVEL 7.0-9.0%: CPT | Mod: S$GLB,,, | Performed by: PODIATRIST

## 2017-04-12 RX ORDER — CIPROFLOXACIN 500 MG/1
500 TABLET ORAL EVERY 12 HOURS
Qty: 20 TABLET | Refills: 0 | Status: SHIPPED | OUTPATIENT
Start: 2017-04-12 | End: 2017-04-22

## 2017-04-12 NOTE — PROGRESS NOTES
Subjective:     Patient ID: Mc Jeter is a 61 y.o. male.    Chief Complaint: toe check (diabetic patient, 2nd left toe, patient rated current pain at 3)    Mc is a 61 y.o. male who presents to the clinic for evaluation and treatment of high risk feet. Mc has a past medical history of Arthritis; Atrial fibrillation; Cataract; Diabetes mellitus; General anesthetics causing adverse effect in therapeutic use; Glomerulonephritis; Hepatitis A; Hypertension; Marfan's syndrome; PONV (postoperative nausea and vomiting); and Retinal detachment (3/19/12). The patient's chief complaint is diabetic toe wound that started yesterday. Patient states he was seen in urgent care and prescribed Clindamycin.  This patient has documented high risk feet requiring routine maintenance secondary to diabetes mellitis and those secondary complications of diabetes, as mentioned..    PCP: Adrien Adams MD    Date Last Seen by PCP: 3/31/17    Current shoe gear:  Affected Foot: Tennis shoes     Unaffected Foot: Tennis shoes    History of Trauma: negative  Sign of Infection: fever 1 week ago    Hemoglobin A1C   Date Value Ref Range Status   03/31/2017 7.2 (H) 4.5 - 6.2 % Final     Comment:     According to ADA guidelines, hemoglobin A1C <7.0% represents  optimal control in non-pregnant diabetic patients.  Different  metrics may apply to specific populations.   Standards of Medical Care in Diabetes - 2016.  For the purpose of screening for the presence of diabetes:  <5.7%     Consistent with the absence of diabetes  5.7-6.4%  Consistent with increasing risk for diabetes   (prediabetes)  >or=6.5%  Consistent with diabetes  Currently no consensus exists for use of hemoglobin A1C  for diagnosis of diabetes for children.     09/08/2016 7.1 (H) 4.5 - 6.2 % Final     Comment:     According to ADA guidelines, hemoglobin A1C <7.0% represents  optimal control in non-pregnant diabetic patients.  Different  metrics may apply to specific  populations.   Standards of Medical Care in Diabetes - 2016.  For the purpose of screening for the presence of diabetes:  <5.7%     Consistent with the absence of diabetes  5.7-6.4%  Consistent with increasing risk for diabetes   (prediabetes)  >or=6.5%  Consistent with diabetes  Currently no consensus exists for use of hemoglobin A1C  for diagnosis of diabetes for children.     08/09/2016 7.9 (H) 4.5 - 6.2 % Final     Comment:     According to ADA guidelines, hemoglobin A1C <7.0% represents  optimal control in non-pregnant diabetic patients.  Different  metrics may apply to specific populations.   Standards of Medical Care in Diabetes - 2016.  For the purpose of screening for the presence of diabetes:  <5.7%     Consistent with the absence of diabetes  5.7-6.4%  Consistent with increasing risk for diabetes   (prediabetes)  >or=6.5%  Consistent with diabetes  Currently no consensus exists for use of hemoglobin A1C  for diagnosis of diabetes for children.             Patient Active Problem List   Diagnosis    Marfan syndrome    Retinal detachment    Subluxation of lens    Epiretinal membrane    Posterior vitreous detachment    Cellulitis and abscess of foot    Diabetic foot infection    Chronic atrial fibrillation    Long term current use of systemic steroids    PMR (polymyalgia rheumatica)    Osteopenia    CMC arthritis    Ascending aorta dilatation    Range of motion deficit    Decreased pinch strength    Stiffness of thumb joint    Pain in thumb joint with movement of right hand       Medication List with Changes/Refills   New Medications    CIPROFLOXACIN HCL (CIPRO) 500 MG TABLET    Take 1 tablet (500 mg total) by mouth every 12 (twelve) hours.   Current Medications    ALENDRONATE (FOSAMAX) 70 MG TABLET    TAKE 1 TABLET (70 MG TOTAL) BY MOUTH EVERY 7 DAYS.    ASPIRIN 81 MG CHEW    Take 81 mg by mouth once daily.    CLINDAMYCIN (CLEOCIN) 300 MG CAPSULE    Take 1 capsule (300 mg total) by mouth 4  (four) times daily.    CONTOUR NEXT STRIPS STRP    TEST BLOOD SUGAR TWICE A DAY    FOSINOPRIL (MONOPRIL) 20 MG TABLET    Take 1 tablet (20 mg total) by mouth once daily.    KETOCONAZOLE (NIZORAL) 2 % CREAM    Apply topically 2 (two) times daily.    LANCETS (MICROLET LANCET) MISC    1 lancet by Misc.(Non-Drug; Combo Route) route 2 (two) times daily.    METFORMIN (GLUCOPHAGE) 850 MG TABLET    Take 1 tablet (850 mg total) by mouth 2 (two) times daily with meals.    MULTIVIT,STRESS FORMULA-ZINC (STRESS FORMULA WITH ZINC) TAB    Take by mouth.    OMEGA-3 FATTY ACIDS-VITAMIN E (FISH OIL) 1,000 MG CAP    Take 2 capsules by mouth once daily. 1 Capsule Oral Twice a day    POLYCARBOPHIL (FIBERCON) 625 MG TABLET    Take 2 tablets by mouth once daily.     PREDNISONE (DELTASONE) 1 MG TABLET        PREDNISONE (DELTASONE) 1 MG TABLET    TAKE 4 TABLETS (4 MG TOTAL) BY MOUTH ONCE DAILY.    PREDNISONE (DELTASONE) 5 MG TABLET        TRIAMCINOLONE ACETONIDE 0.1% (KENALOG) 0.1 % CREAM    Apply topically 2 (two) times daily.    VITAMIN D3 1,000 UNIT TABLET    TAKE 1 CAPSULE (1,000 UNITS TOTAL) BY MOUTH ONCE DAILY.   Discontinued Medications    KETOCONAZOLE (NIZORAL) 2 % SHAMPOO    Use as body wash 3x/week - let sit for at least 5 minutes prior to rinsing       Review of patient's allergies indicates:   Allergen Reactions    Penicillins Rash       Past Surgical History:   Procedure Laterality Date    CATARACT EXTRACTION  10/25/12    complex LEFT EYE    EYE SURGERY      HAND SURGERY Right     HERNIA REPAIR Right     ORIF FOOT FRACTURE Left     RENAL BIOPSY      x 4-5    RETINAL DETACHMENT SURGERY      Left eye    TOE AMPUTATION Left     left 3rd toe    TONSILLECTOMY      TYMPANOSTOMY TUBE PLACEMENT         Family History   Problem Relation Age of Onset    Cancer Mother     Retinal detachment Mother     Macular degeneration Mother     Glaucoma Mother     Heart disease Mother     Heart disease Father     Diabetes Sister   "   Diabetes Brother     Cancer Brother     Melanoma Brother     Hypoglycemic Sister     Hypoglycemic Brother     Heart disease Brother     Eczema Neg Hx     Lupus Neg Hx     Psoriasis Neg Hx        Social History     Social History    Marital status:      Spouse name: N/A    Number of children: N/A    Years of education: N/A     Occupational History    Not on file.     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  Different  metrics may apply to specific populations.   Standards of Medical Care in Diabetes - 2016.  For the purpose of screening for the presence of diabetes:  <5.7%     Consistent with the absence of diabetes  5.7-6.4%  Consistent with increasing risk for diabetes   (prediabetes)  >or=6.5%  Consistent with diabetes  Currently no consensus exists for use of hemoglobin A1C  for diagnosis of diabetes for children.     09/08/2016 7.1 (H) 4.5 - 6.2 % Final     Comment:     According to ADA guidelines, hemoglobin A1C <7.0% represents  optimal control in non-pregnant diabetic patients.  Different  metrics may apply to specific populations.   Standards of Medical Care in Diabetes - 2016.  For the purpose of screening for the presence of diabetes:  <5.7%     Consistent with the absence of diabetes  5.7-6.4%  Consistent with increasing risk " for diabetes   (prediabetes)  >or=6.5%  Consistent with diabetes  Currently no consensus exists for use of hemoglobin A1C  for diagnosis of diabetes for children.     08/09/2016 7.9 (H) 4.5 - 6.2 % Final     Comment:     According to ADA guidelines, hemoglobin A1C <7.0% represents  optimal control in non-pregnant diabetic patients.  Different  metrics may apply to specific populations.   Standards of Medical Care in Diabetes - 2016.  For the purpose of screening for the presence of diabetes:  <5.7%     Consistent with the absence of diabetes  5.7-6.4%  Consistent with increasing risk for diabetes   (prediabetes)  >or=6.5%  Consistent with diabetes  Currently no consensus exists for use of hemoglobin A1C  for diagnosis of diabetes for children.         Review of Systems   Constitutional: Negative for chills and fever.   Respiratory: Negative for shortness of breath.    Cardiovascular: Negative for chest pain, palpitations, orthopnea, claudication and leg swelling.   Gastrointestinal: Negative for diarrhea, nausea and vomiting.   Musculoskeletal: Negative for joint pain.   Skin: Negative for rash.   Neurological: Positive for sensory change. Negative for dizziness, tingling, focal weakness and weakness.   Psychiatric/Behavioral: Negative.          Objective:      PHYSICAL EXAM: Apperance: Alert and orient in no distress,well developed, and with good attention to grooming and body habits  Patient presents ambulating in tennis shoes.   Lower Extremity Exam  VASCULAR: Dorsalis pedis pulses 2/4 bilateral and Posterior Tibial pulses 2/4 bilateral. Capillary fill time <3 seconds bilateral. No edema observed bilateral. Varicosities absent bilateral. Skin temperature of the lower extremities is warm to warm, proximal to distal. Hair growth dim bilateral. (--) lymphangitis or (--) cellulitis noted bilateral.  DERMATOLOGICAL: (+) edema, (+) erythema, (--) malodor, (+) serosanguinous drainage, (+) warmth to left foot.  Ulcer  noted to left distal lateral 2nd toe  with granular base and with a 1 mm yellow/white border and hyperkeratosis surrounding ulcer. The ulcer does not extend into deeper tissue and (--) sinus tracts exist.  The dorsum surface of the feet are soft and supple.  The plantar aspects of both feet are dry and scaly. Webspaces clean, dry and without evidence of break in skin integrity bilateral.   NEUROLOGICAL: Light touch, sharp-dull, proprioception all present and equal bilaterally.  Vibratory sensation diminished at bilateral hallux. Protective sensation intact at all 10 at sites as tested with a [place] 5.07 monofilament.   MUSCULOSKELETAL: Muscle strength is 5/5 for foot inverters, everters, plantarflexors, and dorsiflexors. Muscle tone is normal.  Inspection/palpation of bone, joints and muscles unremarkable with the exception of that noted above. Left 3rd toe amputation noted.                     Assessment:       Encounter Diagnoses   Name Primary?    Toe ulcer, left, with fat layer exposed Yes    Type 2 diabetes mellitus, controlled, with lower extremity ulcer          Plan:   Toe ulcer, left, with fat layer exposed  -     CBC auto differential; Future; Expected date: 4/12/17  -     Sedimentation rate, manual; Future; Expected date: 4/12/17  -     C-reactive protein; Future; Expected date: 4/12/17  -     X-Ray Foot Complete Left; Future; Expected date: 4/12/17  -     ciprofloxacin HCl (CIPRO) 500 MG tablet; Take 1 tablet (500 mg total) by mouth every 12 (twelve) hours.  Dispense: 20 tablet; Refill: 0    Type 2 diabetes mellitus, controlled, with lower extremity ulcer      I counseled the patient on his conditions, their implications and medical management.  With patient's permission, the left toe ulcer was sharply excisionally debrided of viable and nonviable tissue down to good bleeding granular tissue base utilizing sterile #15 blade and tissue nipper and forceps. Wound was irrigated with sterile  saline and bleeding was controlled with direct pressure. Minimal blood loss. The patient tolerated this well. Wound was then dressed with Mes[place]. Patient was given instructions on daily dressing changes. Patient was also instructed on the importance of keeping the wound and dressings clean dry and intact and keeping pressure off the wound area until complete healing of the wound.The patient is alerted to watch for any signs of infection (redness, pus, pain, increased swelling or fever) and call if such occurs. Home wound care instructions are provided.  Ordered CBC, ESR, CRP, and left foot x-ray.   Prescription written for Cipro 500mg to be taken twice daily.   Patient to return 1 week.             Isiah Ontiveros DPM  [facility]

## 2017-04-12 NOTE — MR AVS SNAPSHOT
[facility]  [address] [place] [address]  Phone: [phone]  Fax: [phone]                  Mc Jeter   2017 1:40 PM   Office Visit    Description:  Male : 1955   Provider:  Isiah Ontiveros DPM   Department:  [facility] - Podiat[facility]           Reason for Visit     toe check           Diagnoses this Visit        Comments    Toe ulcer, left, with fat layer exposed    -  Primary            To Do List           Future Appointments        Provider Department Dept Phone    2017 2:15 PM LAB, SAME DAY [facility] [phone]    2017 2:30 PM [facility] [phone]    2017 1:15 PM Gina Leonard OT [facility] [address] [phone]    2017 1:00 PM Isiah Ontiveros DPM [facility] [phone]    2017 2:00 PM Hannah Woodard MD [facility] - Rheumatology [phone]      Goals (5 Years of Data)     None       These Medications        Disp Refills Start End    ciprofloxacin HCl (CIPRO) 500 MG tablet 20 tablet 0 2017    Take 1 tablet (500 mg total) by mouth every 12 (twelve) hours. - Oral    Pharmacy: [facility]/pharmacy #[id number] - [place] - [address] AT [facility] Ph #: [phone]         [facility]     [facility] Nurse Care Line -  Assistance  Unless otherwise directed by your provider, please contact [facility], our nurse care line that is available for  assistance.     Registered nurses in the [facility] provide: appointment scheduling, clinical advisement, health education, and other advisory services.  Call: [phone] (toll free)               Medications           Message regarding Medications     Verify the changes and/or additions to your medication regime listed below are the same as discussed with your clinician today.  If any of these changes or additions are incorrect, please notify your healthcare provider.        START  taking these NEW medications        Refills    ciprofloxacin HCl (CIPRO) 500 MG tablet 0    Sig: Take 1 tablet (500 mg total) by mouth every 12 (twelve) hours.    Class: Normal    Route: Oral      STOP taking these medications     ketoconazole (NIZORAL) 2 % shampoo Use as body wash 3x/week - let sit for at least 5 minutes prior to rinsing           Verify that the below list of medications is an accurate representation of the medications you are currently taking.  If none reported, the list may be blank. If incorrect, please contact your healthcare provider. Carry this list with you in case of emergency.           Current Medications     alendronate (FOSAMAX) 70 MG tablet TAKE 1 TABLET (70 MG TOTAL) BY MOUTH EVERY 7 DAYS.    aspirin 81 MG Chew Take 81 mg by mouth once daily.    ciprofloxacin HCl (CIPRO) 500 MG tablet Take 1 tablet (500 mg total) by mouth every 12 (twelve) hours.    clindamycin (CLEOCIN) 300 MG capsule Take 1 capsule (300 mg total) by mouth 4 (four) times daily.    CONTOUR NEXT STRIPS Strp TEST BLOOD SUGAR TWICE A DAY    fosinopril (MONOPRIL) 20 MG tablet Take 1 tablet (20 mg total) by mouth once daily.    ketoconazole (NIZORAL) 2 % cream Apply topically 2 (two) times daily.    lancets (MICROLET LANCET) Misc 1 lancet by Misc.(Non-Drug; Combo Route) route 2 (two) times daily.    metformin (GLUCOPHAGE) 850 MG tablet Take 1 tablet (850 mg total) by mouth 2 (two) times daily with meals.    multivit,stress formula-zinc (STRESS FORMULA WITH ZINC) Tab Take by mouth.    omega-3 fatty acids-vitamin E (FISH OIL) 1,000 mg Cap Take 2 capsules by mouth once daily. 1 Capsule Oral Twice a day    polycarbophil (FIBERCON) 625 mg tablet Take 2 tablets by mouth once daily.     predniSONE (DELTASONE) 1 MG tablet     predniSONE (DELTASONE) 1 MG tablet TAKE 4 TABLETS (4 MG TOTAL) BY MOUTH ONCE DAILY.    predniSONE (DELTASONE) 5 MG tablet     triamcinolone acetonide 0.1% (KENALOG) 0.1 % cream Apply topically 2 (two) times  "daily.    VITAMIN D3 1,000 unit tablet TAKE 1 CAPSULE (1,000 UNITS TOTAL) BY MOUTH ONCE DAILY.           Clinical Reference Information           Your Vitals Were     BP Pulse Height Weight BMI    104/69 96 6' 5" (1.956 m) 95.4 kg (210 lb 5.1 oz) 24.94 kg/m2      Blood Pressure          Most Recent Value    BP  104/69      Allergies as of 4/12/2017     Penicillins      Immunizations Administered on Date of Encounter - 4/12/2017     None      Orders Placed During Today's Visit     Future Labs/Procedures Expected by Expires    C-reactive protein  4/12/2017 6/11/2018    CBC auto differential  4/12/2017 6/11/2018    Sedimentation rate, manual  4/12/2017 6/11/2018    X-Ray Foot Complete Left  4/12/2017 4/12/2018      Language Assistance Services     ATTENTION: Language assistance services are available, free of charge. Please call [phone].      ATENCIÓN: Si habla addisonañol, tiene a quiroz disposición servicios gratuitos de asistencia lingüística. Llame al [phone].     CHÚ Ý: N?u b?n nói Ti?ng Vi?t, có các d?ch v? h? tr? ngôn ng? mi?n phí dành cho b?n. G?i s? [phone].         [facility] complies with applicable Federal civil rights laws and does not discriminate on the basis of race, color, national origin, age, disability, or sex.        "

## 2017-04-17 DIAGNOSIS — B36.0 TINEA VERSICOLOR: ICD-10-CM

## 2017-04-17 RX ORDER — KETOCONAZOLE 20 MG/ML
SHAMPOO, SUSPENSION TOPICAL
Qty: 240 ML | Refills: 5 | Status: SHIPPED | OUTPATIENT
Start: 2017-04-17 | End: 2017-07-28 | Stop reason: SDUPTHER

## 2017-04-17 RX ORDER — KETOCONAZOLE 20 MG/G
CREAM TOPICAL
Qty: 60 G | Refills: 5 | Status: SHIPPED | OUTPATIENT
Start: 2017-04-17 | End: 2017-07-28 | Stop reason: SDUPTHER

## 2017-04-19 ENCOUNTER — OFFICE VISIT (OUTPATIENT)
Dept: PODIATRY | Facility: CLINIC | Age: 62
End: 2017-04-19
Payer: COMMERCIAL

## 2017-04-19 VITALS
HEART RATE: 77 BPM | DIASTOLIC BLOOD PRESSURE: 75 MMHG | BODY MASS INDEX: 24.76 KG/M2 | WEIGHT: 209.69 LBS | HEIGHT: 77 IN | SYSTOLIC BLOOD PRESSURE: 108 MMHG

## 2017-04-19 DIAGNOSIS — L97.909: ICD-10-CM

## 2017-04-19 DIAGNOSIS — L97.521 TOE ULCER, LEFT, LIMITED TO BREAKDOWN OF SKIN: Primary | ICD-10-CM

## 2017-04-19 DIAGNOSIS — M85.80 OSTEOPENIA, UNSPECIFIED LOCATION: ICD-10-CM

## 2017-04-19 DIAGNOSIS — E11.622: ICD-10-CM

## 2017-04-19 PROCEDURE — 97597 DBRDMT OPN WND 1ST 20 CM/<: CPT | Mod: S$GLB,,, | Performed by: PODIATRIST

## 2017-04-19 PROCEDURE — 99999 PR PBB SHADOW E&M-EST. PATIENT-LVL III: CPT | Mod: PBBFAC,,, | Performed by: PODIATRIST

## 2017-04-19 PROCEDURE — 99499 UNLISTED E&M SERVICE: CPT | Mod: S$GLB,,, | Performed by: PODIATRIST

## 2017-04-19 RX ORDER — MUPIROCIN 20 MG/G
OINTMENT TOPICAL DAILY
Qty: 30 G | Refills: 0 | Status: SHIPPED | OUTPATIENT
Start: 2017-04-19 | End: 2017-07-28

## 2017-04-19 RX ORDER — ALENDRONATE SODIUM 70 MG/1
TABLET ORAL
Qty: 4 TABLET | Refills: 0 | OUTPATIENT
Start: 2017-04-19

## 2017-04-19 NOTE — MR AVS SNAPSHOT
[facility] - [facility]  [address] [place] [address]  Phone: [phone]  Fax: [phone]                  Mc Jeter   2017 1:00 PM   Office Visit    Description:  Male : 1955   Provider:  Isiah Ontiveros DPM   Department:  [facility] - Podiatry           Reason for Visit     Follow-up           Diagnoses this Visit        Comments    Toe ulcer, left, limited to breakdown of skin    -  Primary            To Do List           Future Appointments        Provider Department Dept Phone    2017 10:00 AM Isiah Ontiveros DPM [facility] Pod[facility]ry [phone]    2017 2:00 PM Hannah Woodard MD [facility] - Rheumatology [phone]    2017 9:30 AM LABORATORY, [place] [facility] [phone]      Goals (5 Years of Data)     None       These Medications        Disp Refills Start End    mupirocin (BACTROBAN) 2 % ointment 30 g 0 2017     Apply topically once daily. - Topical (Top)    Pharmacy: [facility]/pharmacy #[id number] - [place] - [address] AT [facility] #: [phone]         [facility]     [facility] Care Line - 24/ Assistance  Unless otherwise directed by your provider, please contact [facility], our nurse care line that is available for / assistance.     Registered nurses in the [facility] provide: appointment scheduling, clinical advisement, health education, and other advisory services.  Call: [phone] (toll free)               Medications           Message regarding Medications     Verify the changes and/or additions to your medication regime listed below are the same as discussed with your clinician today.  If any of these changes or additions are incorrect, please notify your healthcare provider.        START taking these NEW medications        Refills    mupirocin (BACTROBAN) 2 % ointment 0    Sig: Apply topically once daily.    Class: Normal    Route: Topical (Top)           Verify  that the below list of medications is an accurate representation of the medications you are currently taking.  If none reported, the list may be blank. If incorrect, please contact your healthcare provider. Carry this list with you in case of emergency.           Current Medications     alendronate (FOSAMAX) 70 MG tablet TAKE 1 TABLET (70 MG TOTAL) BY MOUTH EVERY 7 DAYS.    aspirin 81 MG Chew Take 81 mg by mouth once daily.    ciprofloxacin HCl (CIPRO) 500 MG tablet Take 1 tablet (500 mg total) by mouth every 12 (twelve) hours.    clindamycin (CLEOCIN) 300 MG capsule Take 1 capsule (300 mg total) by mouth 4 (four) times daily.    CONTOUR NEXT STRIPS Strp TEST BLOOD SUGAR TWICE A DAY    fosinopril (MONOPRIL) 20 MG tablet Take 1 tablet (20 mg total) by mouth once daily.    ketoconazole (NIZORAL) 2 % cream APPLY TOPICALLY 2 (TWO) TIMES DAILY.    ketoconazole (NIZORAL) 2 % shampoo USE AS BODY WASH 3X/WEEK - LET SIT FOR AT LEAST 5 MINUTES PRIOR TO RINSING    lancets (MICROLET LANCET) Misc 1 lancet by Misc.(Non-Drug; Combo Route) route 2 (two) times daily.    metformin (GLUCOPHAGE) 850 MG tablet Take 1 tablet (850 mg total) by mouth 2 (two) times daily with meals.    multivit,stress formula-zinc (STRESS FORMULA WITH ZINC) Tab Take by mouth.    omega-3 fatty acids-vitamin E (FISH OIL) 1,000 mg Cap Take 2 capsules by mouth once daily. 1 Capsule Oral Twice a day    polycarbophil (FIBERCON) 625 mg tablet Take 2 tablets by mouth once daily.     predniSONE (DELTASONE) 1 MG tablet     predniSONE (DELTASONE) 1 MG tablet TAKE 4 TABLETS (4 MG TOTAL) BY MOUTH ONCE DAILY.    predniSONE (DELTASONE) 5 MG tablet     triamcinolone acetonide 0.1% (KENALOG) 0.1 % cream Apply topically 2 (two) times daily.    VITAMIN D3 1,000 unit tablet TAKE 1 CAPSULE (1,000 UNITS TOTAL) BY MOUTH ONCE DAILY.    mupirocin (BACTROBAN) 2 % ointment Apply topically once daily.           Clinical Reference Information           Your Vitals Were     BP Pulse  "Height Weight BMI    108/75 (BP Location: Right arm, Patient Position: Sitting, BP Method: Automatic) 77 6' 5" (1.956 m) 95.1 kg (209 lb 10.5 oz) 24.86 kg/m2      Blood Pressure          Most Recent Value    BP  108/75      Allergies as of 4/19/2017     Penicillins      Immunizations Administered on Date of Encounter - 4/19/2017     None      Language Assistance Services     ATTENTION: Language assistance services are available, free of charge. Please call [phone].      ATENCIÓN: Si habla addisonrigoberto, tiene a quiroz disposición servicios gratuitos de asistencia lingüística. Llame al [phone].     CHÚ Ý: N?u b?n nói Ti?ng Vi?t, có các d?ch v? h? tr? ngôn ng? mi?n phí dành cho b?n. G?i s? [phone].         [facility] complies with applicable Federal civil rights laws and does not discriminate on the basis of race, color, national origin, age, disability, or sex.        "

## 2017-04-24 ENCOUNTER — OFFICE VISIT (OUTPATIENT)
Dept: ALLERGY | Facility: CLINIC | Age: 62
End: 2017-04-24
Payer: COMMERCIAL

## 2017-04-24 VITALS
WEIGHT: 213.88 LBS | HEIGHT: 66 IN | SYSTOLIC BLOOD PRESSURE: 120 MMHG | TEMPERATURE: 97 F | DIASTOLIC BLOOD PRESSURE: 80 MMHG | BODY MASS INDEX: 34.37 KG/M2 | HEART RATE: 74 BPM | RESPIRATION RATE: 15 BRPM

## 2017-04-24 DIAGNOSIS — L30.9 DERMATITIS: Primary | ICD-10-CM

## 2017-04-24 DIAGNOSIS — Q87.40 MARFAN SYNDROME: ICD-10-CM

## 2017-04-24 DIAGNOSIS — M35.3 PMR (POLYMYALGIA RHEUMATICA): Chronic | ICD-10-CM

## 2017-04-24 DIAGNOSIS — L29.9 ITCHING: ICD-10-CM

## 2017-04-24 PROCEDURE — 1160F RVW MEDS BY RX/DR IN RCRD: CPT | Mod: S$GLB,,, | Performed by: ALLERGY & IMMUNOLOGY

## 2017-04-24 PROCEDURE — 99999 PR PBB SHADOW E&M-EST. PATIENT-LVL III: CPT | Mod: PBBFAC,,, | Performed by: ALLERGY & IMMUNOLOGY

## 2017-04-24 PROCEDURE — 99203 OFFICE O/P NEW LOW 30 MIN: CPT | Mod: S$GLB,,, | Performed by: ALLERGY & IMMUNOLOGY

## 2017-04-24 NOTE — PROGRESS NOTES
Chief complaint: Rash, itching    This note was dictated using voice recognition software and may contain errors.    History:    He had a 9 AM appointment on Monday, April 24, 2017.  He has multiple health concerns as noted in his medical record.  He has been receiving treatment for polymyalgia rheumatica.  He is on low-dose prednisone at this time.  The polymyalgia rheumatica developed approximately January 2016.  During the past 6 months these developed a persistent rash with associated itching.  The erythema of the scan is associated with slight elevation.  It is not migratory.  He has dermatitis on his arms legs and trunk.  The rash is not classical urticaria.  Since the rash developed he is not seen a dermatologist.    He does not believe that he is ALLERGIC to any foods or animals.  He has no history of asthma.  He is retired.  He never smoked.    Information in his medical record regarding his past medical history family history and social history was reviewed and updated today.  Significant additions if any are as noted above or below.    Exam:    In general he is in no distress he is alert and oriented well-developed in good mood and attentive  Gait steady  Head no swelling noted  Eyes sclera white conjunctiva pink  Nose patent no polyps seen  Mouth not inflamed  Ears not inflamed  Neck no masses or thyromegaly noted  Lymph nodes no significant cervical or epitrochlear lymphadenopathy noted  Lungs clear to auscultation  Heart irregular rhythm and an irregular fashion.  No murmur heard today  Extremities he is wearing a walking shoe boot on his left foot no inflammation of the hands noted    Impression:    #1 dermatitis, cause uncertain  #2 itching cause uncertain  #3 multiple other health concerns as noted in his medical record including Marfan syndrome, polymyalgia rheumatica, atrial fibrillation, diabetes, and history of childhood glomerulonephritis    Assessment and plan:    While he was here today I  reviewed with him information in his medical record regarding recent laboratory tests.  These tests were performed in April.  His CBC was satisfactory.  His sedimentation rate and C-reactive protein were normal.    I told him in my opinion it would be most appropriate for him to see a dermatologist initially.  In view of the fact that the dermatitis is been persistent and does not come and go and migrate to different locations of the body I told him that dermatologists may recommend that his skin biopsy be performed to assist with evaluation.  This is agreeable with him.  Arrangements were made for him to see Dr. Fleming in consultation.  While he was here today I called Dr. Fleming's office and assisted with making arrangements for him to be seen at 2:15 PM today, April 24.    He was given the office phone number.  I requested that he informed me of Dr. Fleming's assessment.  I told him if I may be of any additional assistance he may contact me.    His appointment was 40 minutes in duration spent entirely in face-to-face contact.  More than 50% of the visit was spent in counseling and coordination of care.

## 2017-04-24 NOTE — MR AVS SNAPSHOT
[facility]  [address] [place] [address]  Phone: [phone]  Fax: [phone]                  Mc Jeter   2017 9:00 AM   Office Visit    Description:  Male : 1955   Provider:  Mando Guevara MD   Department:  [facility] - Allergy/ Immunology           Diagnoses this Visit        Comments    Dermatitis    -  Primary     Itching                To Do List           Future Appointments        Provider Department Dept Phone    2017 10:00 AM [facility] - Podiatry [phone]    2017 2:00 PM Hannah Woodard MD [facility] - Rheumatology [phone]    2017 9:30 AM LABORATORY, [facility] [phone]      Goals (5 Years of Data)     None      [facility]     [facility] -  Assistance  Unless otherwise directed by your provider, please contact [facility], our nurse care line that is available for  assistance.     Registered nurses in the [facility] provide: appointment scheduling, clinical advisement, health education, and other advisory services.  Call: [phone] (toll free)               Medications           Message regarding Medications     Verify the changes and/or additions to your medication regime listed below are the same as discussed with your clinician today.  If any of these changes or additions are incorrect, please notify your healthcare provider.        STOP taking these medications     triamcinolone acetonide 0.1% (KENALOG) 0.1 % cream Apply topically 2 (two) times daily.           Verify that the below list of medications is an accurate representation of the medications you are currently taking.  If none reported, the list may be blank. If incorrect, please contact your healthcare provider. Carry this list with you in case of emergency.           Current Medications     alendronate (FOSAMAX) 70 MG tablet TAKE 1 TABLET (70 MG TOTAL) BY  "MOUTH EVERY 7 DAYS.    aspirin 81 MG Chew Take 81 mg by mouth once daily.    CONTOUR NEXT STRIPS Strp TEST BLOOD SUGAR TWICE A DAY    fosinopril (MONOPRIL) 20 MG tablet Take 1 tablet (20 mg total) by mouth once daily.    ketoconazole (NIZORAL) 2 % cream APPLY TOPICALLY 2 (TWO) TIMES DAILY.    ketoconazole (NIZORAL) 2 % shampoo USE AS BODY WASH 3X/WEEK - LET SIT FOR AT LEAST 5 MINUTES PRIOR TO RINSING    lancets (MICROLET LANCET) Misc 1 lancet by Misc.(Non-Drug; Combo Route) route 2 (two) times daily.    metformin (GLUCOPHAGE) 850 MG tablet Take 1 tablet (850 mg total) by mouth 2 (two) times daily with meals.    mupirocin (BACTROBAN) 2 % ointment Apply topically once daily.    omega-3 fatty acids-vitamin E (FISH OIL) 1,000 mg Cap Take 2 capsules by mouth once daily. 1 Capsule Oral Twice a day    polycarbophil (FIBERCON) 625 mg tablet Take 2 tablets by mouth once daily.     predniSONE (DELTASONE) 1 MG tablet     predniSONE (DELTASONE) 1 MG tablet TAKE 4 TABLETS (4 MG TOTAL) BY MOUTH ONCE DAILY.    predniSONE (DELTASONE) 5 MG tablet     multivit,stress formula-zinc (STRESS FORMULA WITH ZINC) Tab Take by mouth.    VITAMIN D3 1,000 unit tablet TAKE 1 CAPSULE (1,000 UNITS TOTAL) BY MOUTH ONCE DAILY.           Clinical Reference Information           Your Vitals Were     Temp Height Weight BMI       97.2 °F (36.2 °C) 5' 5.5" (1.664 m) 97 kg (213 lb 13.5 oz) 35.04 kg/m2       Allergies as of 4/24/2017     Penicillins      Immunizations Administered on Date of Encounter - 4/24/2017     None      Instructions    Per discussion.       Language Assistance Services     ATTENTION: Language assistance services are available, free of charge. Please call [phone].      ATENCIÓN: Si sydniela marianne, tiene a quiroz disposición servicios gratuitos de asistencia lingüística. Llame al [phone].     CHÚ Ý: N?u b?n nói Ti?ng Vi?t, có các d?ch v? h? tr? ngôn ng? mi?n phí dành cho b?n. G?i s? [phone].         [facility] complies with applicable Federal civil rights laws and does not discriminate on the basis of race, color, national origin, age, disability, or sex.

## 2017-04-29 NOTE — PROGRESS NOTES
Subjective:     Patient ID: Mc Jeter is a 61 y.o. male.    Chief Complaint: Follow-up (Left foot(2nd toe) infection. Patient rates the current pain 2/10 and states there has been a little clear drainage coming from the 2nd toe. )    Mc is a 61 y.o. male who presents to the clinic for evaluation and treatment of high risk feet. Mc has a past medical history of Arthritis; Atrial fibrillation; Cataract; Diabetes mellitus; General anesthetics causing adverse effect in therapeutic use; Glomerulonephritis; Hepatitis A; Hypertension; Marfan's syndrome; PONV (postoperative nausea and vomiting); and Retinal detachment (3/19/12). The patient's chief complaint is diabetic toe wound. Patient states he has been dressing toe as directed and believes its working. Patient also states he completed the Clindamycin and has a few days of Cipro left.  This patient has documented high risk feet requiring routine maintenance secondary to diabetes mellitis and those secondary complications of diabetes, as mentioned..    PCP: Adrien Adams MD    Date Last Seen by PCP: 3/31/17    Current shoe gear:  Affected Foot: Tennis shoes     Unaffected Foot: Tennis shoes    History of Trauma: negative  Sign of Infection: negative    Hemoglobin A1C   Date Value Ref Range Status   03/31/2017 7.2 (H) 4.5 - 6.2 % Final     Comment:     According to ADA guidelines, hemoglobin A1C <7.0% represents  optimal control in non-pregnant diabetic patients.  Different  metrics may apply to specific populations.   Standards of Medical Care in Diabetes - 2016.  For the purpose of screening for the presence of diabetes:  <5.7%     Consistent with the absence of diabetes  5.7-6.4%  Consistent with increasing risk for diabetes   (prediabetes)  >or=6.5%  Consistent with diabetes  Currently no consensus exists for use of hemoglobin A1C  for diagnosis of diabetes for children.     09/08/2016 7.1 (H) 4.5 - 6.2 % Final     Comment:     According to ADA  guidelines, hemoglobin A1C <7.0% represents  optimal control in non-pregnant diabetic patients.  Different  metrics may apply to specific populations.   Standards of Medical Care in Diabetes - 2016.  For the purpose of screening for the presence of diabetes:  <5.7%     Consistent with the absence of diabetes  5.7-6.4%  Consistent with increasing risk for diabetes   (prediabetes)  >or=6.5%  Consistent with diabetes  Currently no consensus exists for use of hemoglobin A1C  for diagnosis of diabetes for children.     08/09/2016 7.9 (H) 4.5 - 6.2 % Final     Comment:     According to ADA guidelines, hemoglobin A1C <7.0% represents  optimal control in non-pregnant diabetic patients.  Different  metrics may apply to specific populations.   Standards of Medical Care in Diabetes - 2016.  For the purpose of screening for the presence of diabetes:  <5.7%     Consistent with the absence of diabetes  5.7-6.4%  Consistent with increasing risk for diabetes   (prediabetes)  >or=6.5%  Consistent with diabetes  Currently no consensus exists for use of hemoglobin A1C  for diagnosis of diabetes for children.             Patient Active Problem List   Diagnosis    Marfan syndrome    Retinal detachment    Subluxation of lens    Epiretinal membrane    Posterior vitreous detachment    Cellulitis and abscess of foot    Diabetic foot infection    Chronic atrial fibrillation    Long term current use of systemic steroids    PMR (polymyalgia rheumatica)    Osteopenia    CMC arthritis    Ascending aorta dilatation    Range of motion deficit    Decreased pinch strength    Stiffness of thumb joint    Pain in thumb joint with movement of right hand       Medication List with Changes/Refills   New Medications    MUPIROCIN (BACTROBAN) 2 % OINTMENT    Apply topically once daily.   Current Medications    ALENDRONATE (FOSAMAX) 70 MG TABLET    TAKE 1 TABLET (70 MG TOTAL) BY MOUTH EVERY 7 DAYS.    ASPIRIN 81 MG CHEW    Take 81 mg by mouth  once daily.    CONTOUR NEXT STRIPS STRP    TEST BLOOD SUGAR TWICE A DAY    FOSINOPRIL (MONOPRIL) 20 MG TABLET    Take 1 tablet (20 mg total) by mouth once daily.    KETOCONAZOLE (NIZORAL) 2 % CREAM    APPLY TOPICALLY 2 (TWO) TIMES DAILY.    KETOCONAZOLE (NIZORAL) 2 % SHAMPOO    USE AS BODY WASH 3X/WEEK - LET SIT FOR AT LEAST 5 MINUTES PRIOR TO RINSING    LANCETS (MICROLET LANCET) MISC    1 lancet by Misc.(Non-Drug; Combo Route) route 2 (two) times daily.    METFORMIN (GLUCOPHAGE) 850 MG TABLET    Take 1 tablet (850 mg total) by mouth 2 (two) times daily with meals.    MULTIVIT,STRESS FORMULA-ZINC (STRESS FORMULA WITH ZINC) TAB    Take by mouth.    OMEGA-3 FATTY ACIDS-VITAMIN E (FISH OIL) 1,000 MG CAP    Take 2 capsules by mouth once daily. 1 Capsule Oral Twice a day    POLYCARBOPHIL (FIBERCON) 625 MG TABLET    Take 2 tablets by mouth once daily.     PREDNISONE (DELTASONE) 1 MG TABLET        PREDNISONE (DELTASONE) 1 MG TABLET    TAKE 4 TABLETS (4 MG TOTAL) BY MOUTH ONCE DAILY.    PREDNISONE (DELTASONE) 5 MG TABLET        VITAMIN D3 1,000 UNIT TABLET    TAKE 1 CAPSULE (1,000 UNITS TOTAL) BY MOUTH ONCE DAILY.   Discontinued Medications    TRIAMCINOLONE ACETONIDE 0.1% (KENALOG) 0.1 % CREAM    Apply topically 2 (two) times daily.       Review of patient's allergies indicates:   Allergen Reactions    Penicillins Rash       Past Surgical History:   Procedure Laterality Date    CATARACT EXTRACTION  10/25/12    complex LEFT EYE    EYE SURGERY      HAND SURGERY Right     HERNIA REPAIR Right     ORIF FOOT FRACTURE Left     RENAL BIOPSY      x 4-5    RETINAL DETACHMENT SURGERY      Left eye    TOE AMPUTATION Left     left 3rd toe    TONSILLECTOMY      TYMPANOSTOMY TUBE PLACEMENT         Family History   Problem Relation Age of Onset    Cancer Mother     Retinal detachment Mother     Macular degeneration Mother     Glaucoma Mother     Heart disease Mother     Heart disease Father     Diabetes Sister      "Diabetes Brother     Cancer Brother     Melanoma Brother     Hypoglycemic Sister     Hypoglycemic Brother     Heart disease Brother     Eczema Neg Hx     Lupus Neg Hx     Psoriasis Neg Hx        Social History     Social History    Marital status:      Spouse name: N/A    Number of children: N/A    Years of education: N/A     Occupational History    Not on file.     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  Different  metrics may apply to specific populations.   Standards of Medical Care in Diabetes - 2016.  For the purpose of screening for the presence of diabetes:  <5.7%     Consistent with the absence of diabetes  5.7-6.4%  Consistent with increasing risk for diabetes   (prediabetes)  >or=6.5%  Consistent with diabetes  Currently no consensus exists for use of hemoglobin A1C  for diagnosis of diabetes for children.     09/08/2016 7.1 (H) 4.5 - 6.2 % Final     Comment:     According to ADA guidelines, hemoglobin A1C <7.0% represents  optimal control in non-pregnant diabetic patients.  Different  metrics may apply to specific populations.   Standards of Medical Care in Diabetes - 2016.  For the purpose of screening for the presence of diabetes:  <5.7%     Consistent with the absence of diabetes  5.7-6.4%  Consistent with increasing risk for diabetes "   (prediabetes)  >or=6.5%  Consistent with diabetes  Currently no consensus exists for use of hemoglobin A1C  for diagnosis of diabetes for children.     08/09/2016 7.9 (H) 4.5 - 6.2 % Final     Comment:     According to ADA guidelines, hemoglobin A1C <7.0% represents  optimal control in non-pregnant diabetic patients.  Different  metrics may apply to specific populations.   Standards of Medical Care in Diabetes - 2016.  For the purpose of screening for the presence of diabetes:  <5.7%     Consistent with the absence of diabetes  5.7-6.4%  Consistent with increasing risk for diabetes   (prediabetes)  >or=6.5%  Consistent with diabetes  Currently no consensus exists for use of hemoglobin A1C  for diagnosis of diabetes for children.         Review of Systems   Constitutional: Negative for chills and fever.   Respiratory: Negative for shortness of breath.    Cardiovascular: Negative for chest pain, palpitations, orthopnea, claudication and leg swelling.   Gastrointestinal: Negative for diarrhea, nausea and vomiting.   Musculoskeletal: Negative for joint pain.   Skin: Negative for rash.   Neurological: Positive for sensory change. Negative for dizziness, tingling, focal weakness and weakness.   Psychiatric/Behavioral: Negative.          Objective:      PHYSICAL EXAM: Apperance: Alert and orient in no distress,well developed, and with good attention to grooming and body habits  Patient presents ambulating in tennis shoes.   Lower Extremity Exam  VASCULAR: Dorsalis pedis pulses 2/4 bilateral and Posterior Tibial pulses 2/4 bilateral. Capillary fill time <3 seconds bilateral. No edema observed bilateral. Varicosities absent bilateral. Skin temperature of the lower extremities is warm to warm, proximal to distal. Hair growth dim bilateral. (--) lymphangitis or (--) cellulitis noted bilateral.  DERMATOLOGICAL: (+) decreased edema, (+) decreased erythema, (--) malodor, (-) drainage, (+) decreased warmth to left foot.  Ulcer  noted to left distal lateral 2nd toe  with granular base and with a 1 mm yellow/white border and hyperkeratosis surrounding ulcer. The ulcer does not extend into deeper tissue and (--) sinus tracts exist.  The dorsum surface of the feet are soft and supple.  The plantar aspects of both feet are dry and scaly. Webspaces clean, dry and without evidence of break in skin integrity bilateral.   NEUROLOGICAL: Light touch, sharp-dull, proprioception all present and equal bilaterally.  Vibratory sensation diminished at bilateral hallux. Protective sensation intact at all 10 at sites as tested with a [place] 5.07 monofilament.   MUSCULOSKELETAL: Muscle strength is 5/5 for foot inverters, everters, plantarflexors, and dorsiflexors. Muscle tone is normal.  Inspection/palpation of bone, joints and muscles unremarkable with the exception of that noted above. Left 3rd toe amputation noted.             Assessment:       Encounter Diagnoses   Name Primary?    Toe ulcer, left, limited to breakdown of skin Yes    Type 2 diabetes mellitus, controlled, with lower extremity ulcer          Plan:   Toe ulcer, left, limited to breakdown of skin  -     mupirocin (BACTROBAN) 2 % ointment; Apply topically once daily.  Dispense: 30 g; Refill: 0    Type 2 diabetes mellitus, controlled, with lower extremity ulcer      I counseled the patient on his conditions, their implications and medical management.  With patient's permission, the left toe ulcer was sharply excisionally debrided of viable and nonviable tissue down to good bleeding granular tissue base utilizing sterile #15 blade and tissue nipper and forceps. Wound was irrigated with sterile saline and bleeding was controlled with direct pressure. Minimal blood loss. The patient tolerated this well. Wound was then dressed with Mesalt. Patient was given instructions on daily dressing changes. Patient was also instructed on the importance of keeping the wound and dressings clean dry  and intact and keeping pressure off the wound area until complete healing of the wound.The patient is alerted to watch for any signs of infection (redness, pus, pain, increased swelling or fever) and call if such occurs. Home wound care instructions are provided.  Prescription written for Bactroban 2% ointment.   Patient to return 2 weeks or sooner if needed.             Isiah Ontiveros DPM  [facility]

## 2017-05-01 ENCOUNTER — OFFICE VISIT (OUTPATIENT)
Dept: PODIATRY | Facility: CLINIC | Age: 62
End: 2017-05-01
Payer: COMMERCIAL

## 2017-05-01 VITALS
HEART RATE: 89 BPM | SYSTOLIC BLOOD PRESSURE: 113 MMHG | BODY MASS INDEX: 24.41 KG/M2 | WEIGHT: 211 LBS | DIASTOLIC BLOOD PRESSURE: 72 MMHG | HEIGHT: 78 IN

## 2017-05-01 DIAGNOSIS — L97.909: ICD-10-CM

## 2017-05-01 DIAGNOSIS — E11.622: ICD-10-CM

## 2017-05-01 DIAGNOSIS — L84 PRE-ULCERATIVE CALLUSES: Primary | ICD-10-CM

## 2017-05-01 PROCEDURE — 11055 PARING/CUTG B9 HYPRKER LES 1: CPT | Mod: S$GLB,,, | Performed by: PODIATRIST

## 2017-05-01 PROCEDURE — 99499 UNLISTED E&M SERVICE: CPT | Mod: S$GLB,,, | Performed by: PODIATRIST

## 2017-05-01 PROCEDURE — 99999 PR PBB SHADOW E&M-EST. PATIENT-LVL III: CPT | Mod: PBBFAC,,, | Performed by: PODIATRIST

## 2017-05-01 RX ORDER — TERBINAFINE HYDROCHLORIDE 250 MG/1
TABLET ORAL
Refills: 0 | COMMUNITY
Start: 2017-04-24 | End: 2017-07-28

## 2017-05-01 RX ORDER — CLOTRIMAZOLE 1 %
CREAM (GRAM) TOPICAL
Refills: 2 | COMMUNITY
Start: 2017-04-24

## 2017-05-01 NOTE — MR AVS SNAPSHOT
[facility]  [address]  [place] [address]  Phone: [phone]  Fax: [phone]                  Mc Jeter   2017 10:00 AM   Office Visit    Description:  Male : 1955   Provider:  Isiah Ontiveros DPM   Department:  [facility]           Reason for Visit     Follow-up     Diabetes Mellitus                To Do List           Future Appointments        Provider Department Dept Phone    2017 9:40 AM Isiah Ontiveros DPM [facility] [phone]    2017 2:00 PM Hannah Woodard MD [facility] - Rheumatology [phone]    2017 9:30 AM LABORATORY, [facility] [phone]      Goals (5 Years of Data)     None      [facility] On Call     [facility] Nurse Care Line -  Assistance  Unless otherwise directed by your provider, please contact [facility], our nurse care line that is available for  assistance.     Registered nurses in the [facility] provide: appointment scheduling, clinical advisement, health education, and other advisory services.  Call: [phone] (toll free)               Medications           Message regarding Medications     Verify the changes and/or additions to your medication regime listed below are the same as discussed with your clinician today.  If any of these changes or additions are incorrect, please notify your healthcare provider.             Verify that the below list of medications is an accurate representation of the medications you are currently taking.  If none reported, the list may be blank. If incorrect, please contact your healthcare provider. Carry this list with you in case of emergency.           Current Medications     alendronate (FOSAMAX) 70 MG tablet TAKE 1 TABLET (70 MG TOTAL) BY MOUTH EVERY 7 DAYS.    aspirin 81 MG Chew Take 81 mg by mouth once daily.    clotrimazole (LOTRIMIN) 1 % cream APPLY TO RASHY AREAS TWICE A DAY FOR 14 DAYS     "CONTOUR NEXT STRIPS Strp TEST BLOOD SUGAR TWICE A DAY    fosinopril (MONOPRIL) 20 MG tablet Take 1 tablet (20 mg total) by mouth once daily.    ketoconazole (NIZORAL) 2 % cream APPLY TOPICALLY 2 (TWO) TIMES DAILY.    ketoconazole (NIZORAL) 2 % shampoo USE AS BODY WASH 3X/WEEK - LET SIT FOR AT LEAST 5 MINUTES PRIOR TO RINSING    lancets (MICROLET LANCET) Misc 1 lancet by Misc.(Non-Drug; Combo Route) route 2 (two) times daily.    metformin (GLUCOPHAGE) 850 MG tablet Take 1 tablet (850 mg total) by mouth 2 (two) times daily with meals.    multivit,stress formula-zinc (STRESS FORMULA WITH ZINC) Tab Take by mouth.    mupirocin (BACTROBAN) 2 % ointment Apply topically once daily.    omega-3 fatty acids-vitamin E (FISH OIL) 1,000 mg Cap Take 2 capsules by mouth once daily. 1 Capsule Oral Twice a day    polycarbophil (FIBERCON) 625 mg tablet Take 2 tablets by mouth once daily.     predniSONE (DELTASONE) 1 MG tablet     predniSONE (DELTASONE) 1 MG tablet TAKE 4 TABLETS (4 MG TOTAL) BY MOUTH ONCE DAILY.    predniSONE (DELTASONE) 5 MG tablet     terbinafine HCl (LAMISIL) 250 mg tablet TAKE 1 TABLET BY MOUTH DAILY FOR 2 WEEKS    VITAMIN D3 1,000 unit tablet TAKE 1 CAPSULE (1,000 UNITS TOTAL) BY MOUTH ONCE DAILY.           Clinical Reference Information           Your Vitals Were     BP Pulse Height Weight BMI    113/72 (BP Location: Left arm, Patient Position: Sitting, BP Method: Automatic) 89 6' 5.5" (1.969 m) 95.7 kg (210 lb 15.7 oz) 24.7 kg/m2      Blood Pressure          Most Recent Value    BP  113/72      Allergies as of 5/1/2017     Penicillins      Immunizations Administered on Date of Encounter - 5/1/2017     None      Language Assistance Services     ATTENTION: Language assistance services are available, free of charge. Please call [phone].      ATENCIÓN: Si sydniela marianne, tiene a quiroz disposición servicios gratuitos de asistencia lingüística. Llame al [phone].     SINAI Ý: N?u b?n nói Ti?ng Vi?t, có các " d?ch v? h? tr? ngôn ng? mi?n phí dành cho b?n. G?i s? [phone].         [facility] complies with applicable Federal civil rights laws and does not discriminate on the basis of race, color, national origin, age, disability, or sex.

## 2017-05-01 NOTE — PROGRESS NOTES
Subjective:     Patient ID: Mc Jeter is a 61 y.o. male.    Chief Complaint: Follow-up (Left 2nd toe. Patient states no pain and no drainage. ) and Diabetes Mellitus ([facility].)    Mc is a 61 y.o. male who presents to the clinic for evaluation and treatment of high risk feet. Mc has a past medical history of Arthritis; Atrial fibrillation; Cataract; Diabetes mellitus; General anesthetics causing adverse effect in therapeutic use; Glomerulonephritis; Hepatitis A; Hypertension; Marfan's syndrome; PONV (postoperative nausea and vomiting); and Retinal detachment (3/19/12). The patient's chief complaint is diabetic toe wound. Patient states he has been dressing toe as directed and believes its working. Patient also states he completed the Clindamycin and has a few days of Cipro left.  This patient has documented high risk feet requiring routine maintenance secondary to diabetes mellitis and those secondary complications of diabetes, as mentioned..    PCP: Adrien Adams MD    Date Last Seen by PCP: 3/31/17    Current shoe gear:  Affected Foot: Tennis shoes     Unaffected Foot: Tennis shoes    History of Trauma: negative  Sign of Infection: negative    Hemoglobin A1C   Date Value Ref Range Status   03/31/2017 7.2 (H) 4.5 - 6.2 % Final     Comment:     According to ADA guidelines, hemoglobin A1C <7.0% represents  optimal control in non-pregnant diabetic patients.  Different  metrics may apply to specific populations.   Standards of Medical Care in Diabetes - 2016.  For the purpose of screening for the presence of diabetes:  <5.7%     Consistent with the absence of diabetes  5.7-6.4%  Consistent with increasing risk for diabetes   (prediabetes)  >or=6.5%  Consistent with diabetes  Currently no consensus exists for use of hemoglobin A1C  for diagnosis of diabetes for children.     09/08/2016 7.1 (H) 4.5 - 6.2 % Final     Comment:     According to ADA guidelines,  hemoglobin A1C <7.0% represents  optimal control in non-pregnant diabetic patients.  Different  metrics may apply to specific populations.   Standards of Medical Care in Diabetes - 2016.  For the purpose of screening for the presence of diabetes:  <5.7%     Consistent with the absence of diabetes  5.7-6.4%  Consistent with increasing risk for diabetes   (prediabetes)  >or=6.5%  Consistent with diabetes  Currently no consensus exists for use of hemoglobin A1C  for diagnosis of diabetes for children.     08/09/2016 7.9 (H) 4.5 - 6.2 % Final     Comment:     According to ADA guidelines, hemoglobin A1C <7.0% represents  optimal control in non-pregnant diabetic patients.  Different  metrics may apply to specific populations.   Standards of Medical Care in Diabetes - 2016.  For the purpose of screening for the presence of diabetes:  <5.7%     Consistent with the absence of diabetes  5.7-6.4%  Consistent with increasing risk for diabetes   (prediabetes)  >or=6.5%  Consistent with diabetes  Currently no consensus exists for use of hemoglobin A1C  for diagnosis of diabetes for children.             Patient Active Problem List   Diagnosis    Marfan syndrome    Retinal detachment    Subluxation of lens    Epiretinal membrane    Posterior vitreous detachment    Cellulitis and abscess of foot    Diabetic foot infection    Chronic atrial fibrillation    Long term current use of systemic steroids    PMR (polymyalgia rheumatica)    Osteopenia    CMC arthritis    Ascending aorta dilatation    Range of motion deficit    Decreased pinch strength    Stiffness of thumb joint    Pain in thumb joint with movement of right hand       Medication List with Changes/Refills   Current Medications    ALENDRONATE (FOSAMAX) 70 MG TABLET    TAKE 1 TABLET (70 MG TOTAL) BY MOUTH EVERY 7 DAYS.    ASPIRIN 81 MG CHEW    Take 81 mg by mouth once daily.    CLOTRIMAZOLE (LOTRIMIN) 1 % CREAM    APPLY TO RASHY AREAS TWICE A DAY FOR 14 DAYS     CONTOUR NEXT STRIPS STRP    TEST BLOOD SUGAR TWICE A DAY    FOSINOPRIL (MONOPRIL) 20 MG TABLET    Take 1 tablet (20 mg total) by mouth once daily.    KETOCONAZOLE (NIZORAL) 2 % CREAM    APPLY TOPICALLY 2 (TWO) TIMES DAILY.    KETOCONAZOLE (NIZORAL) 2 % SHAMPOO    USE AS BODY WASH 3X/WEEK - LET SIT FOR AT LEAST 5 MINUTES PRIOR TO RINSING    LANCETS (MICROLET LANCET) MISC    1 lancet by Misc.(Non-Drug; Combo Route) route 2 (two) times daily.    METFORMIN (GLUCOPHAGE) 850 MG TABLET    Take 1 tablet (850 mg total) by mouth 2 (two) times daily with meals.    MULTIVIT,STRESS FORMULA-ZINC (STRESS FORMULA WITH ZINC) TAB    Take by mouth.    MUPIROCIN (BACTROBAN) 2 % OINTMENT    Apply topically once daily.    OMEGA-3 FATTY ACIDS-VITAMIN E (FISH OIL) 1,000 MG CAP    Take 2 capsules by mouth once daily. 1 Capsule Oral Twice a day    POLYCARBOPHIL (FIBERCON) 625 MG TABLET    Take 2 tablets by mouth once daily.     PREDNISONE (DELTASONE) 1 MG TABLET        PREDNISONE (DELTASONE) 1 MG TABLET    TAKE 4 TABLETS (4 MG TOTAL) BY MOUTH ONCE DAILY.    PREDNISONE (DELTASONE) 5 MG TABLET        TERBINAFINE HCL (LAMISIL) 250 MG TABLET    TAKE 1 TABLET BY MOUTH DAILY FOR 2 WEEKS    VITAMIN D3 1,000 UNIT TABLET    TAKE 1 CAPSULE (1,000 UNITS TOTAL) BY MOUTH ONCE DAILY.       Review of patient's allergies indicates:   Allergen Reactions    Penicillins Rash       Past Surgical History:   Procedure Laterality Date    CATARACT EXTRACTION  10/25/12    complex LEFT EYE    EYE SURGERY      HAND SURGERY Right     HERNIA REPAIR Right     ORIF FOOT FRACTURE Left     RENAL BIOPSY      x 4-5    RETINAL DETACHMENT SURGERY      Left eye    TOE AMPUTATION Left     left 3rd toe    TONSILLECTOMY      TYMPANOSTOMY TUBE PLACEMENT         Family History   Problem Relation Age of Onset    Cancer Mother     Retinal detachment Mother     Macular degeneration Mother     Glaucoma Mother     Heart disease Mother     Heart disease Father      "Diabetes Sister     Diabetes Brother     Cancer Brother     Melanoma Brother     Hypoglycemic Sister     Hypoglycemic Brother     Heart disease Brother     Eczema Neg Hx     Lupus Neg Hx     Psoriasis Neg Hx        Social History     Social History    Marital status:      Spouse name: N/A    Number of children: N/A    Years of education: N/A     Occupational History    Not on file.     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  Different  metrics may apply to specific populations.   Standards of Medical Care in Diabetes - 2016.  For the purpose of screening for the presence of diabetes:  <5.7%     Consistent with the absence of diabetes  5.7-6.4%  Consistent with increasing risk for diabetes   (prediabetes)  >or=6.5%  Consistent with diabetes  Currently no consensus exists for use of hemoglobin A1C  for diagnosis of diabetes for children.     09/08/2016 7.1 (H) 4.5 - 6.2 % Final     Comment:     According to ADA guidelines, hemoglobin A1C <7.0% represents  optimal control in non-pregnant diabetic patients.  Different  metrics may apply to specific populations.   Standards of Medical Care in Diabetes - 2016.  For the purpose of screening for the presence of diabetes:  <5.7%     Consistent with the absence of diabetes  5.7-6.4%  Consistent with increasing risk for " diabetes   (prediabetes)  >or=6.5%  Consistent with diabetes  Currently no consensus exists for use of hemoglobin A1C  for diagnosis of diabetes for children.     08/09/2016 7.9 (H) 4.5 - 6.2 % Final     Comment:     According to ADA guidelines, hemoglobin A1C <7.0% represents  optimal control in non-pregnant diabetic patients.  Different  metrics may apply to specific populations.   Standards of Medical Care in Diabetes - 2016.  For the purpose of screening for the presence of diabetes:  <5.7%     Consistent with the absence of diabetes  5.7-6.4%  Consistent with increasing risk for diabetes   (prediabetes)  >or=6.5%  Consistent with diabetes  Currently no consensus exists for use of hemoglobin A1C  for diagnosis of diabetes for children.         Review of Systems   Constitutional: Negative for chills and fever.   Respiratory: Negative for shortness of breath.    Cardiovascular: Negative for chest pain, palpitations, orthopnea, claudication and leg swelling.   Gastrointestinal: Negative for diarrhea, nausea and vomiting.   Musculoskeletal: Negative for joint pain.   Skin: Negative for rash.   Neurological: Positive for sensory change. Negative for dizziness, tingling, focal weakness and weakness.   Psychiatric/Behavioral: Negative.          Objective:      PHYSICAL EXAM: Apperance: Alert and orient in no distress,well developed, and with good attention to grooming and body habits  Patient presents ambulating in tennis shoes.   Lower Extremity Exam  VASCULAR: Dorsalis pedis pulses 2/4 bilateral and Posterior Tibial pulses 2/4 bilateral. Capillary fill time <3 seconds bilateral. No edema observed bilateral. Varicosities absent bilateral. Skin temperature of the lower extremities is warm to warm, proximal to distal. Hair growth dim bilateral. (--) lymphangitis or (--) cellulitis noted bilateral.  DERMATOLOGICAL: (-) edema, (-) erythema, (--) malodor, (-) drainage, (-) warmth to left foot.  Thin hyperkeratotic tissue  noted distal lateral 2nd toe. Post debridement no open lesion. The dorsum surface of the feet are soft and supple.  The plantar aspects of both feet are dry and scaly. Webspaces clean, dry and without evidence of break in skin integrity bilateral.   NEUROLOGICAL: Light touch, sharp-dull, proprioception all present and equal bilaterally.  Vibratory sensation diminished at bilateral hallux. Protective sensation intact at all 10 at sites as tested with a [place]-Eduardo 5.07 monofilament.   MUSCULOSKELETAL: Muscle strength is 5/5 for foot inverters, everters, plantarflexors, and dorsiflexors. Muscle tone is normal.  Inspection/palpation of bone, joints and muscles unremarkable with the exception of that noted above. Left 3rd toe amputation noted.         Assessment:       Encounter Diagnoses   Name Primary?    Pre-ulcerative calluses - Left Foot Yes    Type 2 diabetes mellitus, controlled, with lower extremity ulcer          Plan:   Pre-ulcerative calluses - Left Foot    Type 2 diabetes mellitus, controlled, with lower extremity ulcer    I counseled the patient on his conditions, their implications and medical management.  With patient's permission, left 2nd toe callus trimmed in thickness with #15 blade in thickness without incident.   Patient  will continue to monitor the areas daily, inspect feet, wear protective shoe gear when ambulatory, moisturizer to maintain skin integrity. Patient reminded of the importance of good nutrition and blood sugar control to help prevent podiatric complications of diabetes.  Patient to return 1 months or sooner if needed.               Isiah Ontiveros DPM  [facility]

## 2017-05-09 DIAGNOSIS — M85.80 OSTEOPENIA, UNSPECIFIED LOCATION: ICD-10-CM

## 2017-05-09 RX ORDER — ALENDRONATE SODIUM 70 MG/1
TABLET ORAL
Qty: 4 TABLET | Refills: 0 | OUTPATIENT
Start: 2017-05-09

## 2017-05-11 ENCOUNTER — TELEPHONE (OUTPATIENT)
Dept: RHEUMATOLOGY | Facility: CLINIC | Age: 62
End: 2017-05-11

## 2017-05-11 NOTE — TELEPHONE ENCOUNTER
----- Message from Lizett Polanco sent at 5/11/2017 11:06 AM CDT -----  Contact: [facility] [phone]  States that pt need refill on alendronate 70m. Please call back at [phone]//thank you acc

## 2017-05-11 NOTE — TELEPHONE ENCOUNTER
Called [facility] pharmacist and told her pt sees Dr. Woodard in [place]. And that if he wants to see us he needs to make an appt with to get any refills. Pharmacist verbalized understanding.

## 2017-06-02 ENCOUNTER — TELEPHONE (OUTPATIENT)
Dept: RHEUMATOLOGY | Facility: CLINIC | Age: 62
End: 2017-06-02

## 2017-06-05 ENCOUNTER — HOSPITAL ENCOUNTER (OUTPATIENT)
Dept: RADIOLOGY | Facility: HOSPITAL | Age: 62
Discharge: HOME | End: 2017-06-05
Attending: INTERNAL MEDICINE
Payer: COMMERCIAL

## 2017-06-05 ENCOUNTER — OFFICE VISIT (OUTPATIENT)
Dept: RHEUMATOLOGY | Facility: CLINIC | Age: 62
End: 2017-06-05
Payer: COMMERCIAL

## 2017-06-05 VITALS
HEIGHT: 78 IN | WEIGHT: 209.38 LBS | RESPIRATION RATE: 18 BRPM | DIASTOLIC BLOOD PRESSURE: 82 MMHG | BODY MASS INDEX: 24.22 KG/M2 | HEART RATE: 97 BPM | SYSTOLIC BLOOD PRESSURE: 115 MMHG

## 2017-06-05 DIAGNOSIS — R21 RASH: ICD-10-CM

## 2017-06-05 DIAGNOSIS — M25.551 RIGHT HIP PAIN: ICD-10-CM

## 2017-06-05 DIAGNOSIS — M25.551 RIGHT HIP PAIN: Primary | ICD-10-CM

## 2017-06-05 DIAGNOSIS — Q87.40 MARFAN'S SYNDROME: ICD-10-CM

## 2017-06-05 PROCEDURE — 99214 OFFICE O/P EST MOD 30 MIN: CPT | Mod: S$GLB,,, | Performed by: INTERNAL MEDICINE

## 2017-06-05 PROCEDURE — 73521 X-RAY EXAM HIPS BI 2 VIEWS: CPT | Mod: TC

## 2017-06-05 PROCEDURE — 73521 X-RAY EXAM HIPS BI 2 VIEWS: CPT | Mod: 26,,, | Performed by: RADIOLOGY

## 2017-06-05 PROCEDURE — 99999 PR PBB SHADOW E&M-EST. PATIENT-LVL IV: CPT | Mod: PBBFAC,,, | Performed by: INTERNAL MEDICINE

## 2017-06-05 ASSESSMENT — ROUTINE ASSESSMENT OF PATIENT INDEX DATA (RAPID3)
AM STIFFNESS SCORE: 1, YES
TOTAL RAPID3 SCORE: 1.5
PAIN SCORE: 2.5
PATIENT GLOBAL ASSESSMENT SCORE: 1
FATIGUE SCORE: 2
MDHAQ FUNCTION SCORE: .3
PSYCHOLOGICAL DISTRESS SCORE: 1.1
WHEN YOU AWAKENED IN THE MORNING OVER THE LAST WEEK, PLEASE INDICATE THE AMOUNT OF TIME IT TAKES UNTIL YOU ARE AS LIMBER AS YOU WILL BE FOR THE DAY: 10 MINS

## 2017-06-05 NOTE — PROGRESS NOTES
Subjective:       Patient ID: Mc Jeter is a 60 y.o. male.    Chief Complaint: Disease Management    HPI: 61 yo with Marfan's syndrome, left eye retinal detachment, DMII, atrial fibrillation on baby aspirin, glomerulonephritis (at age 14) here for evaluation of pain.  Reports at age 14, he had episode of glomerulonephritis (unclear what type), just required high dose prednisone.  He was on prednisone from age 14 to age 29 for his kidneys.  His last flare of kidney disease was in his 40s and it went away.    Diagnosed with Marfans when his daughter was diagnosed and the doctor told him he had MARFANS SYNDROME.   He had local cardiologist who gets yearly echos.  He is getting yearly eye exams.    In January, he started to have fatigue.  He gets up in the morning and feels like he has to nap all day.  He feels drained. Reports pain in both shoulders and both hips and also knees. Pain is worse in shoulders and hips.  Pain level is 3/10. Tylenol improves the pain.  Denies any depression.  Tiredness is improving this week.   Reports he snores. Denies any swelling or stiffness in joints.  He has been worked up by cardiologist for the fatigue and it was negative.  He denies any personal or family history of psoriasis.  Pain is pulling sensation. Denies any radiation. Moving makes pain worse.  Reports headaches off an on since January with sensation of tightness.      Mother and 2 sisters- RA  Daughter- Marfans syndrome      Interval history: He is off prednisone for a week.  Takes extra strength tylenol in morning with improvement. Reports on occasion popping sensation in right hip can be as highas 6/10.    He reports that tylenol improves hip pain.  Denies any shoulder pain.  Denies any rashes. Denies headaches or jaw claudication.  Denies any headaches or night sweats.    Past Medical History   Diagnosis Date    Glomerulonephritis     Marfan's syndrome     Cataract     Retinal detachment 3/19/12     Left eye     Diabetes mellitus     Atrial fibrillation        Review of Systems   Constitutional: Negative for fever, chills, appetite change and fatigue.   HENT: Negative for hearing loss, mouth sores, rhinorrhea, sinus pressure and trouble swallowing.    Eyes: Negative for photophobia, pain, discharge, itching and visual disturbance.   Respiratory: Negative for cough, chest tightness, wheezing and stridor.    Cardiovascular: Negative for chest pain and palpitations.   Gastrointestinal: Negative for blood in stool and abdominal distention.   Endocrine: Negative for cold intolerance and heat intolerance.   Genitourinary: Negative for dysuria, hematuria and flank pain.   Musculoskeletal: Positive for myalgias and arthralgias. Negative for back pain, joint swelling, gait problem, neck pain and neck stiffness.   Skin: Negative for color change, pallor and rash.   Neurological: Negative for dizziness, light-headedness, numbness and headaches.   Hematological: Negative for adenopathy. Does not bruise/bleed easily.   Psychiatric/Behavioral: Negative for decreased concentration and agitation. The patient is not nervous/anxious.                Objective:        Physical Exam   Constitutional: He is oriented to person, place, and time. No distress.   HENT:   Head: Normocephalic and atraumatic.   Right Ear: External ear normal.   Eyes: Conjunctivae and EOM are normal. Pupils are equal, round, and reactive to light.   Neck: Normal range of motion. Neck supple. No JVD present. No tracheal deviation present. No thyromegaly present.   Cardiovascular: Normal rate, regular rhythm, normal heart sounds and intact distal pulses.  Exam reveals no gallop and no friction rub.    No murmur heard.  Pulmonary/Chest: Effort normal. No stridor. No respiratory distress. He has no wheezes. He has no rales.   Abdominal: Soft. Bowel sounds are normal. He exhibits no distension. There is no tenderness. There is no rebound.   Lymphadenopathy:     He has no  cervical adenopathy.   Neurological: He is alert and oriented to person, place, and time.   Skin: He is not diaphoretic.      musculoskeletal:     long extremities in comparison to the length of the trunk  Trouble rising from chair (RESOLVED)  Limited abduction of shoulders to 130 degrees (RESOLVED)  Elbows, hands, writs- no synovitis  Ankles- no synovitis,FROM  Feet- no synovitis, FROM      Alt-58  Creat-1.1  Esr,crp-wnl  ua-negative      Assessment:     62 yo M  with Marfan's syndrome, left eye retinal detachment, DMII, atrial fibrillation on baby aspirin, glomerulonephritis (at age 14) here for evaluation of pain.  He initially presented with shoulder and hip stiffness that resolved on prednisone. He did not have elevated inflammatory markers on presentation but responded classically like PMR.  He is off prednisone for about a week. Has occasional pain in right hip which I suspect is from DJD and bursitis. Will get baseline hip xrays today.    Offered steroid injection into trochanteric bursa but he declined.      Of note, he has right hand with mild contractures. Have asked him to let me know if he has swelling.    1. PMR :  -off prednisone    2. Marfans syndrome-no acute issues  -has outside cardiologist monitoring  - up to date with eye exam  -being evaluated by genetics    3. Rash: reports he was diagnosed with candida infection and has biopsy done.  -will request pathology report  Start benadryl 25mg to 50mg qhs    4. HM:dexa scan done on 3.22.2016 and showed risk of fracture at 2.6 percent; currently on fosamax          rtc in  12  weeks  **

## 2017-06-07 ENCOUNTER — PATIENT MESSAGE (OUTPATIENT)
Dept: RHEUMATOLOGY | Facility: CLINIC | Age: 62
End: 2017-06-07

## 2017-06-20 ENCOUNTER — PATIENT MESSAGE (OUTPATIENT)
Dept: ALLERGY | Facility: CLINIC | Age: 62
End: 2017-06-20

## 2017-06-20 ENCOUNTER — PATIENT MESSAGE (OUTPATIENT)
Dept: RHEUMATOLOGY | Facility: CLINIC | Age: 62
End: 2017-06-20

## 2017-06-21 ENCOUNTER — PATIENT MESSAGE (OUTPATIENT)
Dept: ALLERGY | Facility: CLINIC | Age: 62
End: 2017-06-21

## 2017-06-21 ENCOUNTER — TELEPHONE (OUTPATIENT)
Dept: ALLERGY | Facility: CLINIC | Age: 62
End: 2017-06-21

## 2017-06-26 ENCOUNTER — PATIENT MESSAGE (OUTPATIENT)
Dept: INTERNAL MEDICINE | Facility: CLINIC | Age: 62
End: 2017-06-26

## 2017-06-26 RX ORDER — METOPROLOL SUCCINATE 100 MG/1
100 TABLET, EXTENDED RELEASE ORAL DAILY
Qty: 30 TABLET | Refills: 11 | Status: SHIPPED | OUTPATIENT
Start: 2017-06-26 | End: 2018-06-27 | Stop reason: SDUPTHER

## 2017-07-21 DIAGNOSIS — B36.0 TINEA VERSICOLOR: ICD-10-CM

## 2017-07-24 RX ORDER — KETOCONAZOLE 20 MG/G
CREAM TOPICAL 2 TIMES DAILY
Qty: 60 G | Refills: 5 | Status: SHIPPED | OUTPATIENT
Start: 2017-07-24 | End: 2018-01-20 | Stop reason: SDUPTHER

## 2017-07-24 RX ORDER — KETOCONAZOLE 20 MG/ML
SHAMPOO, SUSPENSION TOPICAL
Qty: 240 ML | Refills: 5 | Status: SHIPPED | OUTPATIENT
Start: 2017-07-24 | End: 2018-02-04 | Stop reason: SDUPTHER

## 2017-07-26 ENCOUNTER — PATIENT MESSAGE (OUTPATIENT)
Dept: RHEUMATOLOGY | Facility: CLINIC | Age: 62
End: 2017-07-26

## 2017-07-26 ENCOUNTER — TELEPHONE (OUTPATIENT)
Dept: RHEUMATOLOGY | Facility: CLINIC | Age: 62
End: 2017-07-26

## 2017-07-26 DIAGNOSIS — M25.50 POLYARTHRALGIA: Primary | ICD-10-CM

## 2017-07-26 NOTE — TELEPHONE ENCOUNTER
Left v/m Please tell patient that I received his email and would like him to get additional blood work ASAP and also ask him to get his rheumatologist to send us copy of note and blood work. (Routing comment)      //AEL

## 2017-07-28 ENCOUNTER — OFFICE VISIT (OUTPATIENT)
Dept: INTERNAL MEDICINE | Facility: CLINIC | Age: 62
End: 2017-07-28
Payer: COMMERCIAL

## 2017-07-28 VITALS
TEMPERATURE: 98 F | HEART RATE: 74 BPM | HEIGHT: 78 IN | WEIGHT: 209.19 LBS | DIASTOLIC BLOOD PRESSURE: 80 MMHG | SYSTOLIC BLOOD PRESSURE: 126 MMHG | BODY MASS INDEX: 24.2 KG/M2

## 2017-07-28 DIAGNOSIS — I48.20 CHRONIC ATRIAL FIBRILLATION: ICD-10-CM

## 2017-07-28 DIAGNOSIS — M85.80 OSTEOPENIA, UNSPECIFIED LOCATION: ICD-10-CM

## 2017-07-28 DIAGNOSIS — Z00.00 ROUTINE GENERAL MEDICAL EXAMINATION AT HEALTH CARE FACILITY: Primary | ICD-10-CM

## 2017-07-28 DIAGNOSIS — T50.905A HYPERGLYCEMIA, DRUG-INDUCED: ICD-10-CM

## 2017-07-28 DIAGNOSIS — R73.9 HYPERGLYCEMIA, DRUG-INDUCED: ICD-10-CM

## 2017-07-28 DIAGNOSIS — D84.1 HYPOCOMPLEMENTEMIA: ICD-10-CM

## 2017-07-28 DIAGNOSIS — Z12.11 COLON CANCER SCREENING: ICD-10-CM

## 2017-07-28 PROCEDURE — 99396 PREV VISIT EST AGE 40-64: CPT | Mod: S$GLB,,, | Performed by: INTERNAL MEDICINE

## 2017-07-28 PROCEDURE — 99999 PR PBB SHADOW E&M-EST. PATIENT-LVL III: CPT | Mod: PBBFAC,,, | Performed by: INTERNAL MEDICINE

## 2017-07-28 RX ORDER — CETIRIZINE HYDROCHLORIDE 10 MG/1
20 TABLET ORAL DAILY
Refills: 1 | COMMUNITY
Start: 2017-06-12 | End: 2023-08-29

## 2017-07-28 RX ORDER — TRIAMCINOLONE ACETONIDE 1 MG/G
CREAM TOPICAL
Refills: 0 | COMMUNITY
Start: 2017-06-12 | End: 2017-07-28

## 2017-07-28 RX ORDER — FAMOTIDINE 20 MG/1
20 TABLET, FILM COATED ORAL 2 TIMES DAILY
COMMUNITY
End: 2022-01-19

## 2017-07-28 RX ORDER — HYDROXYZINE HYDROCHLORIDE 25 MG/1
1 TABLET, FILM COATED ORAL DAILY
Refills: 0 | COMMUNITY
Start: 2017-07-06 | End: 2017-07-28

## 2017-07-28 RX ORDER — ALENDRONATE SODIUM 70 MG/1
TABLET ORAL
Qty: 4 TABLET | Refills: 11 | Status: SHIPPED | OUTPATIENT
Start: 2017-07-28 | End: 2023-08-29

## 2017-07-28 RX ORDER — TADALAFIL 5 MG/1
5 TABLET ORAL DAILY PRN
Qty: 30 TABLET | Refills: 0 | Status: SHIPPED | OUTPATIENT
Start: 2017-07-28 | End: 2024-01-23

## 2017-07-28 RX ORDER — PREDNISONE 10 MG/1
3 TABLET ORAL EVERY OTHER DAY
Refills: 0 | COMMUNITY
Start: 2017-07-19 | End: 2023-08-29

## 2017-07-28 NOTE — PROGRESS NOTES
Subjective:       Patient ID: Mc Jeter is a 61 y.o. male.    Chief Complaint: Follow-up (1 mo)    HPI  Patient is a 59-year-old male coming in for updated physical exam.  Is patient has a history of Marfan syndrome, chronic A. fib, hyperglycemia associated with chronic steroid use, osteopenia associated with steroid use and recently diagnosed hypocomplementemia.  He is following with a rheumatologist in our [place] rheumatology department.  He is following with Dr. Hoyos an allergist in the [facility].  He states he's been doing very well.  He had been pretty much on auto  until recently he developed a rather significant rash.  As he went to workup for the rash he saw a dermatologist as well as an allergist.  The subsequent diagnosis was the hypocomplementemia.  He is now on steroids again.  He had come off steroids and had been doing well off of them and as a result of dental able to come off of his metformin but now he is back on the steroids so is not sure what the situation may be with his sugars at this point.    Patient was started on Fosamax for osteopenia and high fracture risk due to steroid use.  In for some reason the prescription ran out and he was unable to acquire refill.  He would like to get back on that at this time.    He has chronic atrial fibrillation and dilatation of the proximal aorta.  He is been followed by Dr. Mac Blackburn for this issue.  He relates that he has not seen Dr. Blackburn in follow-up for little while.  He is not had any problems with his A. fib and he does not have any issues with chest pain or palpitations.  We have recommended he make a follow-up with Dr. Blackburn to stay in touch on that issue.    He is due for colon cancer screening as well as some lab work.    Review of Systems   Constitutional: Negative for fatigue, fever and unexpected weight change.   HENT: Negative for hearing loss, postnasal drip and rhinorrhea.    Eyes: Negative for pain and visual  "disturbance.   Respiratory: Negative for cough, shortness of breath and wheezing.    Cardiovascular: Negative for chest pain and palpitations.   Gastrointestinal: Negative for constipation, diarrhea, nausea and vomiting.   Endocrine: Negative for polyphagia and polyuria.   Genitourinary: Negative for dysuria and hematuria.   Musculoskeletal: Negative for arthralgias, back pain, myalgias and neck stiffness.   Skin: Negative for pallor and rash.   Neurological: Negative for dizziness, seizures, syncope, speech difficulty, weakness and headaches.   Hematological: Negative for adenopathy.   Psychiatric/Behavioral: Negative for confusion and dysphoric mood. The patient is not nervous/anxious.        Objective:   /80   Pulse 74   Temp 97.6 °F (36.4 °C) (Tympanic)   Ht 6' 6" (1.981 m)   Wt 94.9 kg (209 lb 3.5 oz)   BMI 24.18 kg/m²      Physical Exam   Constitutional: He is oriented to person, place, and time. He appears well-developed and well-nourished. No distress.   HENT:   Head: Normocephalic and atraumatic.   Mouth/Throat: Oropharynx is clear and moist.   Eyes: EOM are normal. Pupils are equal, round, and reactive to light.   Neck: Normal range of motion. Neck supple. No JVD present. No thyromegaly present.   Cardiovascular: Normal rate, normal heart sounds and intact distal pulses.  An irregularly irregular rhythm present. Exam reveals no gallop and no friction rub.    No murmur heard.  Pulmonary/Chest: Effort normal and breath sounds normal. He has no wheezes. He has no rales.   Abdominal: Soft. Bowel sounds are normal. He exhibits no distension. There is no tenderness. There is no rebound and no guarding.   Musculoskeletal: Normal range of motion. He exhibits no edema.   Lymphadenopathy:     He has no cervical adenopathy.   Neurological: He is alert and oriented to person, place, and time. He has normal reflexes. No cranial nerve deficit.   Skin: Skin is warm and dry. No rash noted.   Psychiatric: He has " a normal mood and affect. Judgment normal.   Vitals reviewed.      No visits with results within 2 Week(s) from this visit.   Latest known visit with results is:   Lab Visit on 04/12/2017   Component Date Value    WBC 04/13/2017 11.40     RBC 04/13/2017 5.02     Hemoglobin 04/13/2017 15.6     Hematocrit 04/13/2017 45.2     MCV 04/13/2017 90     MCH 04/13/2017 31.1*    MCHC 04/13/2017 34.5     RDW 04/13/2017 12.9     Platelets 04/13/2017 291     MPV 04/13/2017 11.3     Lymph # 04/13/2017 CANCELED     Mono # 04/13/2017 CANCELED     Eos # 04/13/2017 CANCELED     Baso # 04/13/2017 CANCELED     Gran% 04/13/2017 55.0     Lymph% 04/13/2017 22.0     Mono% 04/13/2017 12.0     Eosinophil% 04/13/2017 2.0     Basophil% 04/13/2017 0.0     Bands 04/13/2017 6.0     Metamyelocytes 04/13/2017 3.0     Platelet Estimate 04/13/2017 Appears normal     Aniso 04/13/2017 Slight     Poik 04/13/2017 Slight     Poly 04/13/2017 Occasional     Tear Drop Cells 04/13/2017 Occasional     Spherocytes 04/13/2017 Occasional     Basophilic Stippling 04/13/2017 Occasional     Differential Method 04/13/2017 Manual     Sed Rate 04/12/2017 10     CRP 04/12/2017 4.8        Assessment:       1. Routine general medical examination at health care facility    2. Hypocomplementemia    3. Osteopenia, unspecified location    4. Hyperglycemia, drug-induced    5. Chronic atrial fibrillation    6. Colon cancer screening        Plan:   Chronic atrial fibrillation  Rate controlled.  On aspirin for blood thinner.  Continue to follow with Dr. Clint Blackburn    Mc was seen today for follow-up.    Diagnoses and all orders for this visit:    Routine general medical examination at health care facility  -     Hemoglobin A1c; Future  -     Lipid panel; Future  -     PSA, Screening; Future    Hypocomplementemia  Comments:  Following with Dr. Hoyos.  On steroid therapy, tapering at  this time.    Osteopenia, unspecified  location  Comments:  continue fosamax.  [facility] up [facility]  Orders:  -     alendronate (FOSAMAX) 70 MG tablet; TAKE 1 TABLET (70 MG TOTAL) BY MOUTH EVERY 7 DAYS.    Hyperglycemia, drug-induced  Comments:  Has been off metformin due to sugars doing well.  Steroids were added back.  Will need to recheck a1c.    Chronic atrial fibrillation    Colon cancer screening  -     Case request GI: COLONOSCOPY; Future    Other orders  -     tadalafil (CIALIS) 5 MG tablet; Take 1 tablet (5 mg total) by mouth daily as needed for Erectile Dysfunction.        Return in about 6 months (around 1/28/2018).

## 2017-08-02 ENCOUNTER — LAB VISIT (OUTPATIENT)
Dept: LAB | Facility: HOSPITAL | Age: 62
End: 2017-08-02
Attending: INTERNAL MEDICINE
Payer: COMMERCIAL

## 2017-08-02 DIAGNOSIS — M25.50 POLYARTHRALGIA: ICD-10-CM

## 2017-08-02 DIAGNOSIS — Z00.00 ROUTINE GENERAL MEDICAL EXAMINATION AT HEALTH CARE FACILITY: ICD-10-CM

## 2017-08-02 LAB
ALBUMIN SERPL BCP-MCNC: 4 G/DL
ALP SERPL-CCNC: 38 U/L
ALT SERPL W/O P-5'-P-CCNC: 52 U/L
ANION GAP SERPL CALC-SCNC: 15 MMOL/L
AST SERPL-CCNC: 24 U/L
BILIRUB SERPL-MCNC: 2 MG/DL
BUN SERPL-MCNC: 20 MG/DL
C3 SERPL-MCNC: 63 MG/DL
C4 SERPL-MCNC: 7 MG/DL
CALCIUM SERPL-MCNC: 9.2 MG/DL
CCP AB SER IA-ACNC: <0.5 U/ML
CHLORIDE SERPL-SCNC: 98 MMOL/L
CHOLEST/HDLC SERPL: 7.7 {RATIO}
CO2 SERPL-SCNC: 25 MMOL/L
COMPLEXED PSA SERPL-MCNC: 0.99 NG/ML
CREAT SERPL-MCNC: 1.3 MG/DL
CRP SERPL-MCNC: 1.8 MG/L
ERYTHROCYTE [SEDIMENTATION RATE] IN BLOOD BY WESTERGREN METHOD: 5 MM/HR
EST. GFR  (AFRICAN AMERICAN): >60 ML/MIN/1.73 M^2
EST. GFR  (NON AFRICAN AMERICAN): 58.5 ML/MIN/1.73 M^2
GLUCOSE SERPL-MCNC: 262 MG/DL
HDL/CHOLESTEROL RATIO: 12.9 %
HDLC SERPL-MCNC: 240 MG/DL
HDLC SERPL-MCNC: 31 MG/DL
LDLC SERPL CALC-MCNC: ABNORMAL MG/DL
NONHDLC SERPL-MCNC: 209 MG/DL
POTASSIUM SERPL-SCNC: 3.3 MMOL/L
PROT SERPL-MCNC: 7.2 G/DL
SODIUM SERPL-SCNC: 138 MMOL/L
TRIGL SERPL-MCNC: 693 MG/DL

## 2017-08-02 PROCEDURE — 85651 RBC SED RATE NONAUTOMATED: CPT

## 2017-08-02 PROCEDURE — 80053 COMPREHEN METABOLIC PANEL: CPT

## 2017-08-02 PROCEDURE — 83036 HEMOGLOBIN GLYCOSYLATED A1C: CPT

## 2017-08-02 PROCEDURE — 84153 ASSAY OF PSA TOTAL: CPT

## 2017-08-02 PROCEDURE — 85007 BL SMEAR W/DIFF WBC COUNT: CPT

## 2017-08-02 PROCEDURE — 80061 LIPID PANEL: CPT

## 2017-08-02 PROCEDURE — 86255 FLUORESCENT ANTIBODY SCREEN: CPT | Mod: 91

## 2017-08-02 PROCEDURE — 86803 HEPATITIS C AB TEST: CPT

## 2017-08-02 PROCEDURE — 86431 RHEUMATOID FACTOR QUANT: CPT

## 2017-08-02 PROCEDURE — 86705 HEP B CORE ANTIBODY IGM: CPT

## 2017-08-02 PROCEDURE — 86140 C-REACTIVE PROTEIN: CPT

## 2017-08-02 PROCEDURE — 86038 ANTINUCLEAR ANTIBODIES: CPT

## 2017-08-02 PROCEDURE — 86160 COMPLEMENT ANTIGEN: CPT | Mod: 59

## 2017-08-02 PROCEDURE — 86225 DNA ANTIBODY NATIVE: CPT

## 2017-08-02 PROCEDURE — 82595 ASSAY OF CRYOGLOBULIN: CPT

## 2017-08-02 PROCEDURE — 86706 HEP B SURFACE ANTIBODY: CPT

## 2017-08-02 PROCEDURE — 86160 COMPLEMENT ANTIGEN: CPT

## 2017-08-02 PROCEDURE — 36415 COLL VENOUS BLD VENIPUNCTURE: CPT | Mod: PO

## 2017-08-02 PROCEDURE — 87340 HEPATITIS B SURFACE AG IA: CPT

## 2017-08-02 PROCEDURE — 86200 CCP ANTIBODY: CPT

## 2017-08-02 PROCEDURE — 85027 COMPLETE CBC AUTOMATED: CPT

## 2017-08-03 ENCOUNTER — PATIENT MESSAGE (OUTPATIENT)
Dept: INTERNAL MEDICINE | Facility: CLINIC | Age: 62
End: 2017-08-03

## 2017-08-03 ENCOUNTER — PATIENT MESSAGE (OUTPATIENT)
Dept: RHEUMATOLOGY | Facility: CLINIC | Age: 62
End: 2017-08-03

## 2017-08-03 ENCOUNTER — TELEPHONE (OUTPATIENT)
Dept: RHEUMATOLOGY | Facility: CLINIC | Age: 62
End: 2017-08-03

## 2017-08-03 DIAGNOSIS — T50.905A HYPERGLYCEMIA, DRUG-INDUCED: Primary | ICD-10-CM

## 2017-08-03 DIAGNOSIS — R73.9 HYPERGLYCEMIA, DRUG-INDUCED: Primary | ICD-10-CM

## 2017-08-03 LAB
ANISOCYTOSIS BLD QL SMEAR: SLIGHT
BASOPHILS # BLD AUTO: ABNORMAL K/UL
BASOPHILS NFR BLD: 0 %
DIFFERENTIAL METHOD: ABNORMAL
DSDNA AB SER-ACNC: NORMAL [IU]/ML
EOSINOPHIL # BLD AUTO: ABNORMAL K/UL
EOSINOPHIL NFR BLD: 7 %
ERYTHROCYTE [DISTWIDTH] IN BLOOD BY AUTOMATED COUNT: 13.5 %
ESTIMATED AVG GLUCOSE: 209 MG/DL
HBA1C MFR BLD HPLC: 8.9 %
HBV CORE IGM SERPL QL IA: NEGATIVE
HBV SURFACE AB SER-ACNC: NEGATIVE M[IU]/ML
HBV SURFACE AG SERPL QL IA: NEGATIVE
HCT VFR BLD AUTO: 49.7 %
HCV AB SERPL QL IA: NEGATIVE
HGB BLD-MCNC: 17.1 G/DL
LYMPHOCYTES # BLD AUTO: ABNORMAL K/UL
LYMPHOCYTES NFR BLD: 35 %
MCH RBC QN AUTO: 31.7 PG
MCHC RBC AUTO-ENTMCNC: 34.4 G/DL
MCV RBC AUTO: 92 FL
MONOCYTES # BLD AUTO: ABNORMAL K/UL
MONOCYTES NFR BLD: 9 %
NEUTROPHILS NFR BLD: 48 %
NEUTS BAND NFR BLD MANUAL: 1 %
PLATELET # BLD AUTO: 158 K/UL
PLATELET BLD QL SMEAR: ABNORMAL
PMV BLD AUTO: 10.8 FL
RBC # BLD AUTO: 5.4 M/UL
RHEUMATOID FACT SERPL-ACNC: <10 IU/ML
WBC # BLD AUTO: 6.78 K/UL

## 2017-08-03 RX ORDER — METFORMIN HYDROCHLORIDE 500 MG/1
500 TABLET ORAL 2 TIMES DAILY WITH MEALS
Qty: 180 TABLET | Refills: 3 | Status: SHIPPED | OUTPATIENT
Start: 2017-08-03 | End: 2017-08-03 | Stop reason: ALTCHOICE

## 2017-08-03 RX ORDER — PIOGLITAZONEHYDROCHLORIDE 15 MG/1
15 TABLET ORAL DAILY
Qty: 90 TABLET | Refills: 3 | Status: SHIPPED | OUTPATIENT
Start: 2017-08-03 | End: 2018-07-11 | Stop reason: SDUPTHER

## 2017-08-04 LAB — ANA SER QL IF: NORMAL

## 2017-08-04 NOTE — TELEPHONE ENCOUNTER
Cancelled but patient had already picked up.  Patient stated he will go  the right one (pioglitazone) today.

## 2017-08-07 LAB
ANCA AB TITR SER IF: NORMAL TITER
P-ANCA TITR SER IF: NORMAL TITER

## 2017-08-11 LAB — CRYOGLOB SER QL: NORMAL

## 2017-09-07 ENCOUNTER — OFFICE VISIT (OUTPATIENT)
Dept: RHEUMATOLOGY | Facility: CLINIC | Age: 62
End: 2017-09-07
Payer: COMMERCIAL

## 2017-09-07 VITALS
BODY MASS INDEX: 24.12 KG/M2 | HEART RATE: 77 BPM | SYSTOLIC BLOOD PRESSURE: 120 MMHG | HEIGHT: 78 IN | DIASTOLIC BLOOD PRESSURE: 77 MMHG | RESPIRATION RATE: 18 BRPM | WEIGHT: 208.5 LBS

## 2017-09-07 DIAGNOSIS — Q87.40 MARFAN'S SYNDROME: ICD-10-CM

## 2017-09-07 DIAGNOSIS — M31.8 HYPOCOMPLEMENTEMIC URTICARIAL VASCULITIS: ICD-10-CM

## 2017-09-07 DIAGNOSIS — D84.1 HYPOCOMPLEMENTEMIA: Primary | ICD-10-CM

## 2017-09-07 PROCEDURE — 99214 OFFICE O/P EST MOD 30 MIN: CPT | Mod: S$GLB,,, | Performed by: INTERNAL MEDICINE

## 2017-09-07 PROCEDURE — 3008F BODY MASS INDEX DOCD: CPT | Mod: S$GLB,,, | Performed by: INTERNAL MEDICINE

## 2017-09-07 PROCEDURE — 99999 PR PBB SHADOW E&M-EST. PATIENT-LVL IV: CPT | Mod: PBBFAC,,, | Performed by: INTERNAL MEDICINE

## 2017-09-07 RX ORDER — CYPROHEPTADINE HYDROCHLORIDE 4 MG/1
4 TABLET ORAL 3 TIMES DAILY PRN
COMMUNITY
End: 2022-01-19 | Stop reason: ALTCHOICE

## 2017-09-07 RX ORDER — HYDROXYZINE HYDROCHLORIDE 25 MG/1
25 TABLET, FILM COATED ORAL 2 TIMES DAILY
COMMUNITY
End: 2023-08-29

## 2017-09-07 NOTE — PROGRESS NOTES
Subjective:       Patient ID: Mc Jeter is a 60 y.o. male.    Chief Complaint: Disease Management    HPI: 61 yo with Marfan's syndrome, left eye retinal detachment, DMII, atrial fibrillation on baby aspirin, glomerulonephritis (at age 14) here for evaluation of pain.  Reports at age 14, he had episode of glomerulonephritis (unclear what type), just required high dose prednisone.  He was on prednisone from age 14 to age 29 for his kidneys.  His last flare of kidney disease was in his 40s and it went away.    Diagnosed with Marfans when his daughter was diagnosed and the doctor told him he had MARFANS SYNDROME.   He had local cardiologist who gets yearly echos.  He is getting yearly eye exams.    In January, he started to have fatigue.  He gets up in the morning and feels like he has to nap all day.  He feels drained. Reports pain in both shoulders and both hips and also knees. Pain is worse in shoulders and hips.  Pain level is 3/10. Tylenol improves the pain.  Denies any depression.  Tiredness is improving this week.   Reports he snores. Denies any swelling or stiffness in joints.  He has been worked up by cardiologist for the fatigue and it was negative.  He denies any personal or family history of psoriasis.  Pain is pulling sensation. Denies any radiation. Moving makes pain worse.  Reports headaches off an on since January with sensation of tightness.      Mother and 2 sisters- RA  Daughter- Marfans syndrome      Interval history: He was diagnosed with chronic idiopathic urticaria. He was put on 4 different antihistamines.   He reports that the rashes are still there. He reports dry mouth.  He is on  Prednisone 30mg every other day for a month and half.        Past Medical History   Diagnosis Date    Glomerulonephritis     Marfan's syndrome     Cataract     Retinal detachment 3/19/12     Left eye    Diabetes mellitus     Atrial fibrillation        Review of Systems   Constitutional: Negative for fever,  chills, appetite change and fatigue.   HENT: Negative for hearing loss, mouth sores, rhinorrhea, sinus pressure and trouble swallowing.    Eyes: Negative for photophobia, pain, discharge, itching and visual disturbance.   Respiratory: Negative for cough, chest tightness, wheezing and stridor.    Cardiovascular: Negative for chest pain and palpitations.   Gastrointestinal: Negative for blood in stool and abdominal distention.   Endocrine: Negative for cold intolerance and heat intolerance.   Genitourinary: Negative for dysuria, hematuria and flank pain.   Musculoskeletal: Positive for myalgias and arthralgias. Negative for back pain, joint swelling, gait problem, neck pain and neck stiffness.   Skin: Negative for color change, pallor and rash.   Neurological: Negative for dizziness, light-headedness, numbness and headaches.   Hematological: Negative for adenopathy. Does not bruise/bleed easily.   Psychiatric/Behavioral: Negative for decreased concentration and agitation. The patient is not nervous/anxious.                Objective:        Physical Exam   Constitutional: He is oriented to person, place, and time. No distress.   HENT:   Head: Normocephalic and atraumatic.   Right Ear: External ear normal.   Eyes: Conjunctivae and EOM are normal. Pupils are equal, round, and reactive to light.   Neck: Normal range of motion. Neck supple. No JVD present. No tracheal deviation present. No thyromegaly present.   Cardiovascular: Normal rate, regular rhythm, normal heart sounds and intact distal pulses.  Exam reveals no gallop and no friction rub.    No murmur heard.  Pulmonary/Chest: Effort normal. No stridor. No respiratory distress. He has no wheezes. He has no rales.   Abdominal: Soft. Bowel sounds are normal. He exhibits no distension. There is no tenderness. There is no rebound.   Lymphadenopathy:     He has no cervical adenopathy.   Neurological: He is alert and oriented to person, place, and time.   Skin: He is not  diaphoretic.      musculoskeletal:     long extremities in comparison to the length of the trunk  Trouble rising from chair (RESOLVED)  Limited abduction of shoulders to 130 degrees (RESOLVED)  Elbows, hands, writs- no synovitis  Ankles- no synovitis,FROM  Feet- no synovitis, FROM  abs: ed-negative  c4- L  Ccp,rf-negative  Cryoglobulin-negative  anca-negative      Outside labs:  c1 esterase inhibitor: WNL      Alt-58  Creat-1.1  Esr,crp-wnl  ua-negative      Assessment:     60 yo M  with Marfan's syndrome, left eye retinal detachment, DMII, atrial fibrillation on baby aspirin, glomerulonephritis (at age 14) here for evaluation of pain.  He initially presented with shoulder and hip stiffness that resolved on prednisone. He did not have elevated inflammatory markers on presentation but responded classically like PMR.  PMR has resolved but now patient has recurrent episodes of urticaria associated with hypocomplementemia. SLE work up negative.  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 Marfans syndrome-no acute issues  -has outside cardiologist monitoring  - up to date with eye exam  -being evaluated by genetics    3. HM:dexa scan done on 3.22.2016 and showed risk of fracture at 2.6 percent; currently on fosamax          rtc in  12  weeks  **

## 2017-09-07 NOTE — PROGRESS NOTES
Subjective:       Patient ID: Mc Jeter is a 60 y.o. male.    Chief Complaint: Disease Management    HPI: 61 yo with Marfan's syndrome, left eye retinal detachment, DMII, atrial fibrillation on baby aspirin, glomerulonephritis (at age 14) here for evaluation of pain.  Reports at age 14, he had episode of glomerulonephritis (unclear what type), just required high dose prednisone.  He was on prednisone from age 14 to age 29 for his kidneys.  His last flare of kidney disease was in his 40s and it went away.    Diagnosed with Marfans when his daughter was diagnosed and the doctor told him he had MARFANS SYNDROME.   He had local cardiologist who gets yearly echos.  He is getting yearly eye exams.    In January, he started to have fatigue.  He gets up in the morning and feels like he has to nap all day.  He feels drained. Reports pain in both shoulders and both hips and also knees. Pain is worse in shoulders and hips.  Pain level is 3/10. Tylenol improves the pain.  Denies any depression.  Tiredness is improving this week.   Reports he snores. Denies any swelling or stiffness in joints.  He has been worked up by cardiologist for the fatigue and it was negative.  He denies any personal or family history of psoriasis.  Pain is pulling sensation. Denies any radiation. Moving makes pain worse.  Reports headaches off an on since January with sensation of tightness.      Mother and 2 sisters- RA  Daughter- Marfans syndrome      Interval history: He is off prednisone for a week.  Takes extra strength tylenol in morning with improvement. Reports on occasion popping sensation in right hip can be as highas 6/10.    He reports that tylenol improves hip pain.  Denies any shoulder pain.  Denies any rashes. Denies headaches or jaw claudication.  Denies any headaches or night sweats.    Past Medical History   Diagnosis Date    Glomerulonephritis     Marfan's syndrome     Cataract     Retinal detachment 3/19/12     Left eye     Diabetes mellitus     Atrial fibrillation        Review of Systems   Constitutional: Negative for fever, chills, appetite change and fatigue.   HENT: Negative for hearing loss, mouth sores, rhinorrhea, sinus pressure and trouble swallowing.    Eyes: Negative for photophobia, pain, discharge, itching and visual disturbance.   Respiratory: Negative for cough, chest tightness, wheezing and stridor.    Cardiovascular: Negative for chest pain and palpitations.   Gastrointestinal: Negative for blood in stool and abdominal distention.   Endocrine: Negative for cold intolerance and heat intolerance.   Genitourinary: Negative for dysuria, hematuria and flank pain.   Musculoskeletal: Positive for myalgias and arthralgias. Negative for back pain, joint swelling, gait problem, neck pain and neck stiffness.   Skin: Negative for color change, pallor and rash.   Neurological: Negative for dizziness, light-headedness, numbness and headaches.   Hematological: Negative for adenopathy. Does not bruise/bleed easily.   Psychiatric/Behavioral: Negative for decreased concentration and agitation. The patient is not nervous/anxious.                Objective:        Physical Exam   Constitutional: He is oriented to person, place, and time. No distress.   HENT:   Head: Normocephalic and atraumatic.   Right Ear: External ear normal.   Eyes: Conjunctivae and EOM are normal. Pupils are equal, round, and reactive to light.   Neck: Normal range of motion. Neck supple. No JVD present. No tracheal deviation present. No thyromegaly present.   Cardiovascular: Normal rate, regular rhythm, normal heart sounds and intact distal pulses.  Exam reveals no gallop and no friction rub.    No murmur heard.  Pulmonary/Chest: Effort normal. No stridor. No respiratory distress. He has no wheezes. He has no rales.   Abdominal: Soft. Bowel sounds are normal. He exhibits no distension. There is no tenderness. There is no rebound.   Lymphadenopathy:     He has no  cervical adenopathy.   Neurological: He is alert and oriented to person, place, and time.   Skin: He is not diaphoretic.      musculoskeletal:     long extremities in comparison to the length of the trunk  Trouble rising from chair (RESOLVED)  Limited abduction of shoulders to 130 degrees (RESOLVED)  Elbows, hands, writs- no synovitis  Ankles- no synovitis,FROM  Feet- no synovitis, FROM     labs: ed-negative  c4- L  Ccp,rf-negative  Cryoglobulin-negative  anca-negative      Outside labs:  c1 esterase inhibitor: WNL      Alt-58  Creat-1.1  Esr,crp-wnl  ua-negative      Assessment:     62 yo M  with Marfan's syndrome, left eye retinal detachment, DMII, atrial fibrillation on baby aspirin, glomerulonephritis (at age 14) here for evaluation of pain.  He initially presented with shoulder and hip stiffness that resolved on prednisone. He did not have elevated inflammatory markers on presentation but responded classically like PMR.  PMR has resolved but now patient has recurrent episodes of urticaria associated with hypocomplementemia. SLE work up negative.  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 Marfans syndrome-no acute issues  -has outside cardiologist monitoring  - up to date with eye exam  -being evaluated by genetics    3. HM:dexa scan done on 3.22.2016 and showed risk of fracture at 2.6 percent; currently on fosamax          rtc in  12  weeks  **

## 2017-09-15 ENCOUNTER — TELEPHONE (OUTPATIENT)
Dept: RHEUMATOLOGY | Facility: CLINIC | Age: 62
End: 2017-09-15

## 2017-09-15 NOTE — TELEPHONE ENCOUNTER
Reviewed outside pathology:  June 13 2017  Right rib cage biopsy:  Urticarial hypersensitivity reaction; such as to insect bite, drug. Sections show a superficial and mid dermal mixed inflammatory infiltrate with lymphoid cells,eosinophils, and rare neutrophils.

## 2017-09-18 ENCOUNTER — PATIENT MESSAGE (OUTPATIENT)
Dept: INTERNAL MEDICINE | Facility: CLINIC | Age: 62
End: 2017-09-18

## 2017-09-19 ENCOUNTER — TELEPHONE (OUTPATIENT)
Dept: INTERNAL MEDICINE | Facility: CLINIC | Age: 62
End: 2017-09-19

## 2017-09-20 ENCOUNTER — OFFICE VISIT (OUTPATIENT)
Dept: SLEEP MEDICINE | Facility: CLINIC | Age: 62
End: 2017-09-20
Payer: COMMERCIAL

## 2017-09-20 VITALS
OXYGEN SATURATION: 94 % | HEIGHT: 78 IN | DIASTOLIC BLOOD PRESSURE: 70 MMHG | HEART RATE: 76 BPM | SYSTOLIC BLOOD PRESSURE: 100 MMHG | RESPIRATION RATE: 18 BRPM | BODY MASS INDEX: 24.05 KG/M2 | WEIGHT: 207.88 LBS

## 2017-09-20 DIAGNOSIS — G47.33 OSA (OBSTRUCTIVE SLEEP APNEA): ICD-10-CM

## 2017-09-20 DIAGNOSIS — Q87.40 MARFAN SYNDROME: Primary | ICD-10-CM

## 2017-09-20 PROCEDURE — 3008F BODY MASS INDEX DOCD: CPT | Mod: S$GLB,,, | Performed by: NURSE PRACTITIONER

## 2017-09-20 PROCEDURE — 99999 PR PBB SHADOW E&M-EST. PATIENT-LVL IV: CPT | Mod: PBBFAC,,, | Performed by: NURSE PRACTITIONER

## 2017-09-20 PROCEDURE — 99204 OFFICE O/P NEW MOD 45 MIN: CPT | Mod: S$GLB,,, | Performed by: NURSE PRACTITIONER

## 2017-09-20 NOTE — PATIENT INSTRUCTIONS
Your provider has scheduled you for a sleep study.   You should be receiving a phone call from the sleep lab shortly after your study has been approved by your insurance. Your insurance will decide approval for home sleep test vs. inlab sleep study (formal polysomnogram).  Please make sure you have your current phone numbers in the [facility] system. If you do not hear from anyone in the next 10 business days, please call the sleep lab at [phone] to schedule your sleep study.     -The formal inlab sleep studies are performed at [facility]. If it is noted that you do have sleep apnea on your initial sleep study, you may receive a call back for a second night study with the CPAP before you come back to the office.     -The home sleep test are performed at your home through [facility]. If you have any questions please contact our sleep lab at [phone]. You can also go to [email or web address] for more information about your home sleep test.     The sleep lab will also make a follow up appointment with your provider to review the results of the sleep test. This follow up appointment will be 10-14 days after your sleep study to review the results.

## 2017-09-20 NOTE — PROGRESS NOTES
"Subjective:      Patient ID: Mc Jeter is a 61 y.o. male.    Chief Complaint: Consult (rick)    Patient presents to the office today for evaluation of sleep apnea.  He has a long-standing history of snoring and apnea events.  2 siblings with sleep apnea.  Patient with Marfan syndrome and chronic A. Fib.  Patient states he wakes up gasping for air at times.  He has frequent nighttime awakenings.    STOP - BANG Questionnaire:     1. Snoring : Do you snore loudly ?    Yes    2. Tired : Do you often feel tired, fatigued, or sleepy during daytime? Yes    3. Observed: Has anyone observed you stop breathing during your sleep?   Yes     4. Blood pressure : Do you have or are you being treated for high blood pressure?   Yes    5. BMI :BMI more than 35 kg/m2?   No    6. Age : Age over 50 yr old?   Yes    7. Neck circumference: Neck circumference greater than 40 cm?   No    8. Gender: Gender male?   Yes    High risk of RICK: Yes 5 - 8  Intermediate risk of RICK: Yes 3 - 4  Low risk of RICK: Yes 0 - 2      References:   STOP Questionnaire   A Tool to Screen Patients for Obstructive Sleep Apnea: CL EnglishR.C.P.C., Aldo Abrams M.B.B.S., Claudette Berg M.D.,Marlys Cervantes, Ph.D., Quentin Gutierrez M.B.B.S.,_ MACK Tay.Sc.,_ Eris Dasilva M.D., Tone Mac F.R.C.P.C.; Anesthesiology 2008; 108:812-21 Copyright © 2008, the American Society of Anesthesiologists, Inc. [employer]          /70 (BP Location: Right arm, Patient Position: Sitting)   Pulse 76   Resp 18   Ht 6' 6" (1.981 m)   Wt 94.3 kg (207 lb 14.3 oz)   SpO2 (!) 94%   BMI 24.02 kg/m²   Body mass index is 24.02 kg/m².    Review of Systems   Respiratory: Positive for snoring.    Psychiatric/Behavioral: Positive for sleep disturbance.   All other systems reviewed and are negative.    Objective:      Physical Exam   Constitutional: He is oriented to person, place, and time. He appears well-developed and " well-nourished.   HENT:   Head: Normocephalic and atraumatic.   Mouth/Throat: Oropharynx is clear and moist.   Eyes: EOM are normal. Pupils are equal, round, and reactive to light.   Neck: Normal range of motion. Neck supple.   Cardiovascular: Normal rate and regular rhythm.  Exam reveals no gallop and no friction rub.    No murmur heard.  Pulmonary/Chest: Effort normal and breath sounds normal.   Abdominal: Soft. Bowel sounds are normal. He exhibits no distension. There is no tenderness.   Neurological: He is alert and oriented to person, place, and time.   Skin: Skin is warm and dry.   Psychiatric: He has a normal mood and affect.       Assessment:       1. Marfan syndrome    2. RICK (obstructive sleep apnea)        Outpatient Encounter Prescriptions as of 9/20/2017   Medication Sig Dispense Refill    alendronate (FOSAMAX) 70 MG tablet TAKE 1 TABLET (70 MG TOTAL) BY MOUTH EVERY 7 DAYS. 4 tablet 11    aspirin 81 MG Chew Take 81 mg by mouth once daily.      cetirizine (ZYRTEC) 10 MG tablet Take 20 mg by mouth 2 (two) times daily.  1    clotrimazole (LOTRIMIN) 1 % cream APPLY TO RASHY AREAS TWICE A DAY FOR 14 DAYS  2    CONTOUR NEXT STRIPS Strp TEST BLOOD SUGAR TWICE A  strip 3    cyproheptadine (PERIACTIN) 4 mg tablet Take 4 mg by mouth 3 (three) times daily as needed.      famotidine (PEPCID) 20 MG tablet Take 20 mg by mouth 2 (two) times daily.      hydrOXYzine HCl (ATARAX) 25 MG tablet Take 25 mg by mouth 3 (three) times daily.      ketoconazole (NIZORAL) 2 % cream APPLY TOPICALLY 2 (TWO) TIMES DAILY. 60 g 5    ketoconazole (NIZORAL) 2 % shampoo USE AS BODY WASH 3X/WEEK - LET SIT FOR AT LEAST 5 MINUTES PRIOR TO RINSING 240 mL 5    lancets (MICROLET LANCET) Misc 1 lancet by Misc.(Non-Drug; Combo Route) route 2 (two) times daily. 100 each 11    metoprolol succinate (TOPROL-XL) 100 MG 24 hr tablet Take 1 tablet (100 mg total) by mouth once daily. 30 tablet 11    multivit,stress formula-zinc  (STRESS FORMULA WITH ZINC) Tab Take by mouth.      omalizumab (XOLAIR) 150 mg injection Inject 150 mg into the skin every 28 days.      omega-3 fatty acids-vitamin E (FISH OIL) 1,000 mg Cap Take 2 capsules by mouth once daily. 1 Capsule Oral Twice a day      pioglitazone (ACTOS) 15 MG tablet Take 1 tablet (15 mg total) by mouth once daily. 90 tablet 3    polycarbophil (FIBERCON) 625 mg tablet Take 2 tablets by mouth once daily.       predniSONE (DELTASONE) 1 MG tablet TAKE 4 TABLETS (4 MG TOTAL) BY MOUTH ONCE DAILY. 120 tablet 3    predniSONE (DELTASONE) 10 MG tablet Take 2 tablets by mouth 2 (two) times daily.  0    tadalafil (CIALIS) 5 MG tablet Take 1 tablet (5 mg total) by mouth daily as needed for Erectile Dysfunction. 30 tablet 0    VITAMIN D3 1,000 unit tablet TAKE 1 CAPSULE (1,000 UNITS TOTAL) BY MOUTH ONCE DAILY.  4     No facility-administered encounter medications on file as of 9/20/2017.      Orders Placed This Encounter   Procedures    Polysomnogram (CPAP will be added if patient meets diagnostic criteria.)     Standing Status:   Future     Standing Expiration Date:   9/20/2018     Plan:     Formal polysomnogram for evaluation of sleep apnea. Return to clinic when study is available for review.

## 2017-09-21 ENCOUNTER — TELEPHONE (OUTPATIENT)
Dept: PULMONOLOGY | Facility: CLINIC | Age: 62
End: 2017-09-21

## 2017-09-21 DIAGNOSIS — G47.33 OSA (OBSTRUCTIVE SLEEP APNEA): Primary | ICD-10-CM

## 2017-09-22 ENCOUNTER — OFFICE VISIT (OUTPATIENT)
Dept: INTERNAL MEDICINE | Facility: CLINIC | Age: 62
End: 2017-09-22
Payer: COMMERCIAL

## 2017-09-22 ENCOUNTER — HOSPITAL ENCOUNTER (OUTPATIENT)
Dept: RADIOLOGY | Facility: HOSPITAL | Age: 62
Discharge: HOME | End: 2017-09-22
Attending: PHYSICIAN ASSISTANT
Payer: COMMERCIAL

## 2017-09-22 VITALS
SYSTOLIC BLOOD PRESSURE: 110 MMHG | TEMPERATURE: 98 F | WEIGHT: 206.38 LBS | HEART RATE: 70 BPM | BODY MASS INDEX: 23.88 KG/M2 | DIASTOLIC BLOOD PRESSURE: 70 MMHG | HEIGHT: 78 IN

## 2017-09-22 DIAGNOSIS — M79.89 SWELLING OF LEFT INDEX FINGER: Primary | ICD-10-CM

## 2017-09-22 DIAGNOSIS — M79.89 SWELLING OF LEFT INDEX FINGER: ICD-10-CM

## 2017-09-22 DIAGNOSIS — L08.9 SKIN INFECTION: ICD-10-CM

## 2017-09-22 PROCEDURE — 99999 PR PBB SHADOW E&M-EST. PATIENT-LVL V: CPT | Mod: PBBFAC,,, | Performed by: PHYSICIAN ASSISTANT

## 2017-09-22 PROCEDURE — 73130 X-RAY EXAM OF HAND: CPT | Mod: TC,PO,LT

## 2017-09-22 PROCEDURE — 3008F BODY MASS INDEX DOCD: CPT | Mod: S$GLB,,, | Performed by: PHYSICIAN ASSISTANT

## 2017-09-22 PROCEDURE — 73130 X-RAY EXAM OF HAND: CPT | Mod: 26,LT,, | Performed by: RADIOLOGY

## 2017-09-22 PROCEDURE — 99214 OFFICE O/P EST MOD 30 MIN: CPT | Mod: S$GLB,,, | Performed by: PHYSICIAN ASSISTANT

## 2017-09-22 RX ORDER — DOXYCYCLINE HYCLATE 100 MG
100 TABLET ORAL EVERY 12 HOURS
Qty: 14 TABLET | Refills: 0 | Status: SHIPPED | OUTPATIENT
Start: 2017-09-22 | End: 2017-09-29

## 2017-09-22 NOTE — PROGRESS NOTES
Subjective:       Patient ID: Mc Jeter is a 61 y.o. male.    Chief Complaint: Hand Pain (left index finger/ infection/swelling)    Patient comes in today for evaluation of acute pain/swelling to left index finger.  He noticed this about 12 hours ago   He has some small abrasions on his hands, but no injuries that he is aware of.   No history of gout       Hand Pain    The incident occurred 12 to 24 hours ago. The incident occurred at home. There was no injury mechanism. The pain is present in the left fingers. The quality of the pain is described as aching. The pain does not radiate. The pain is at a severity of 4/10. The pain has been constant since the incident. Pertinent negatives include no chest pain, muscle weakness, numbness or tingling.       Past Medical History:   Diagnosis Date    Arthritis     Polymylgia Rheumatica    Atrial fibrillation     Cataract     Diabetes mellitus     General anesthetics causing adverse effect in therapeutic use     slow to wake up    Glomerulonephritis     Hepatitis A     Hypertension     Marfan's syndrome     PONV (postoperative nausea and vomiting)     Retinal detachment 3/19/12    Left eye       Current Outpatient Prescriptions   Medication Sig Dispense Refill    alendronate (FOSAMAX) 70 MG tablet TAKE 1 TABLET (70 MG TOTAL) BY MOUTH EVERY 7 DAYS. 4 tablet 11    aspirin 81 MG Chew Take 81 mg by mouth once daily.      cetirizine (ZYRTEC) 10 MG tablet Take 20 mg by mouth 2 (two) times daily.  1    clotrimazole (LOTRIMIN) 1 % cream APPLY TO RASHY AREAS TWICE A DAY FOR 14 DAYS  2    CONTOUR NEXT STRIPS Strp TEST BLOOD SUGAR TWICE A  strip 3    cyproheptadine (PERIACTIN) 4 mg tablet Take 4 mg by mouth 3 (three) times daily as needed.      famotidine (PEPCID) 20 MG tablet Take 20 mg by mouth 2 (two) times daily.      hydrOXYzine HCl (ATARAX) 25 MG tablet Take 25 mg by mouth 2 (two) times daily.       ketoconazole (NIZORAL) 2 % cream APPLY TOPICALLY 2  "(TWO) TIMES DAILY. 60 g 5    ketoconazole (NIZORAL) 2 % shampoo USE AS BODY WASH 3X/WEEK - LET SIT FOR AT LEAST 5 MINUTES PRIOR TO RINSING 240 mL 5    lancets (MICROLET LANCET) Misc 1 lancet by Misc.(Non-Drug; Combo Route) route 2 (two) times daily. 100 each 11    metoprolol succinate (TOPROL-XL) 100 MG 24 hr tablet Take 1 tablet (100 mg total) by mouth once daily. 30 tablet 11    multivit,stress formula-zinc (STRESS FORMULA WITH ZINC) Tab Take by mouth.      omalizumab (XOLAIR) 150 mg injection Inject 150 mg into the skin every 28 days.      omega-3 fatty acids-vitamin E (FISH OIL) 1,000 mg Cap Take 2 capsules by mouth once daily. 1 Capsule Oral Twice a day      pioglitazone (ACTOS) 15 MG tablet Take 1 tablet (15 mg total) by mouth once daily. 90 tablet 3    polycarbophil (FIBERCON) 625 mg tablet Take 2 tablets by mouth once daily.       predniSONE (DELTASONE) 10 MG tablet Take 3 tablets by mouth every other day.   0    tadalafil (CIALIS) 5 MG tablet Take 1 tablet (5 mg total) by mouth daily as needed for Erectile Dysfunction. 30 tablet 0    VITAMIN D3 1,000 unit tablet TAKE 1 CAPSULE (1,000 UNITS TOTAL) BY MOUTH ONCE DAILY.  4    doxycycline (VIBRA-TABS) 100 MG tablet Take 1 tablet (100 mg total) by mouth every 12 (twelve) hours. 14 tablet 0    predniSONE (DELTASONE) 1 MG tablet TAKE 4 TABLETS (4 MG TOTAL) BY MOUTH ONCE DAILY. 120 tablet 3     No current facility-administered medications for this visit.        Review of Systems   Cardiovascular: Negative for chest pain.   Neurological: Negative for tingling and numbness.       Objective:   /70   Pulse 70   Temp 98.3 °F (36.8 °C) (Tympanic)   Ht 6' 5.5" (1.969 m)   Wt 93.6 kg (206 lb 5.6 oz)   BMI 24.15 kg/m²      Physical Exam   Constitutional: He appears well-developed and well-nourished. No distress.   HENT:   Head: Normocephalic and atraumatic.   Eyes: Conjunctivae and EOM are normal. Pupils are equal, round, and reactive to light. "   Musculoskeletal:        Left hand: He exhibits tenderness and swelling. He exhibits normal range of motion, normal capillary refill and no deformity. Normal sensation noted. Normal strength noted.        Hands:  Neurological: He is alert.         Lab Results   Component Value Date    WBC 6.78 08/02/2017    HGB 17.1 08/02/2017    HCT 49.7 08/02/2017     08/02/2017    CHOL 240 (H) 08/02/2017    TRIG 693 (H) 08/02/2017    HDL 31 (L) 08/02/2017    ALT 52 (H) 08/02/2017    AST 24 08/02/2017     08/02/2017    K 3.3 (L) 08/02/2017    CL 98 08/02/2017    CREATININE 1.3 08/02/2017    BUN 20 08/02/2017    CO2 25 08/02/2017    TSH 0.278 (L) 02/15/2016    PSA 0.99 08/02/2017    INR 1.0 03/03/2015    GLUF 133 (H) 09/17/2012    HGBA1C 8.9 (H) 08/02/2017       Assessment:       1. Swelling of left index finger    2. Skin infection        Plan:   Swelling of left index finger  X-Ray Hand 2 View Left; Future; Expected date: 09/22/2017- xray intertpreted by myself at time of visit. Formal report given to patient via portal.   Total clinic time >45 mins with 1/2 spent face to face   -     doxycycline (VIBRA-TABS) 100 MG tablet; Take 1 tablet (100 mg total) by mouth every 12 (twelve) hours.  Dispense: 14 tablet; Refill: 0    Skin infection  -     doxycycline (VIBRA-TABS) 100 MG tablet; Take 1 tablet (100 mg total) by mouth every 12 (twelve) hours.  Dispense: 14 tablet; Refill: 0    Does not present as a gout like attack   Given there is a break in skin, will treat as an infection   Patient to follow up if no improvement

## 2017-09-26 ENCOUNTER — PATIENT MESSAGE (OUTPATIENT)
Dept: RHEUMATOLOGY | Facility: CLINIC | Age: 62
End: 2017-09-26

## 2017-10-23 ENCOUNTER — TELEPHONE (OUTPATIENT)
Dept: PEDIATRICS | Facility: CLINIC | Age: 62
End: 2017-10-23

## 2017-10-23 ENCOUNTER — TELEPHONE (OUTPATIENT)
Dept: PULMONOLOGY | Facility: CLINIC | Age: 62
End: 2017-10-23

## 2017-10-23 ENCOUNTER — OFFICE VISIT (OUTPATIENT)
Dept: SLEEP MEDICINE | Facility: CLINIC | Age: 62
End: 2017-10-23
Payer: COMMERCIAL

## 2017-10-23 DIAGNOSIS — E78.2 MIXED HYPERLIPIDEMIA: Primary | ICD-10-CM

## 2017-10-23 DIAGNOSIS — G47.33 OSA (OBSTRUCTIVE SLEEP APNEA): ICD-10-CM

## 2017-10-23 PROCEDURE — 99999 PR PBB SHADOW E&M-EST. PATIENT-LVL II: CPT | Mod: PBBFAC,,,

## 2017-10-23 PROCEDURE — 99499 UNLISTED E&M SERVICE: CPT | Mod: S$GLB,,, | Performed by: INTERNAL MEDICINE

## 2017-10-23 PROCEDURE — 95806 SLEEP STUDY UNATT&RESP EFFT: CPT | Mod: S$GLB,,, | Performed by: INTERNAL MEDICINE

## 2017-10-23 NOTE — PROGRESS NOTES
Assessment and Recommendations  Data collected for 7 hours 49 minutes. Minimal excluded data. Lowest oxygen saturation was 91%. Average  heart rate was 74 bpm with a maximal heart rate of 91 bpm. Snoring recorded above 50 dB 78% of the time.  Apnea-hypopnea index: 5.9 events per hour total events 46.  Mild obstructive sleep apnea-hypopnea syndrome is detected. Severity may be underestimated.  Treatment recommendations:  CPAP would be the guideline recommendation for first-line treatment for obstructive sleep apnea.  Either order in lab CPAP titration or AutoPap device.  Follow-up evaluation and treatment in the sleep disorder clinic.  Other modalities for treatment of obstructive sleep apnea may be explored in patients with intolerant to CPAP  including evaluation by ear nose and throat, mandibular advancement device, nonsupine sleep positioning device.  Close follow-up to ensure resolution of symptoms.

## 2017-10-23 NOTE — TELEPHONE ENCOUNTER
----- Message from Mignon Castillo sent at 10/23/2017 11:10 AM CDT -----  Contact: Charleen/[facility]  Charleen called to speak with the nurse to see if the doctor will like to add a statin therapy for this patient.    [facility]acy #[id number] - [place] - [address] [place] [address] [place] [address]  Phone: [phone] Fax: [phone]    ThanksMignon

## 2017-10-23 NOTE — TELEPHONE ENCOUNTER
----- Message from Yemi Montes sent at 10/23/2017 10:13 AM CDT -----  Pt is requesting a call from nurse to discuss r/s her apt.          Please call pt back at [phone]

## 2017-10-25 ENCOUNTER — PATIENT MESSAGE (OUTPATIENT)
Dept: PULMONOLOGY | Facility: CLINIC | Age: 62
End: 2017-10-25

## 2017-10-25 DIAGNOSIS — G47.33 OSA (OBSTRUCTIVE SLEEP APNEA): Primary | ICD-10-CM

## 2017-11-08 ENCOUNTER — OFFICE VISIT (OUTPATIENT)
Dept: SLEEP MEDICINE | Facility: CLINIC | Age: 62
End: 2017-11-08
Payer: COMMERCIAL

## 2017-11-08 VITALS
BODY MASS INDEX: 24.18 KG/M2 | HEIGHT: 78 IN | OXYGEN SATURATION: 99 % | SYSTOLIC BLOOD PRESSURE: 118 MMHG | DIASTOLIC BLOOD PRESSURE: 70 MMHG | HEART RATE: 87 BPM | WEIGHT: 209 LBS | RESPIRATION RATE: 17 BRPM

## 2017-11-08 DIAGNOSIS — G47.33 OSA (OBSTRUCTIVE SLEEP APNEA): Primary | ICD-10-CM

## 2017-11-08 DIAGNOSIS — Q87.40 MARFAN SYNDROME: ICD-10-CM

## 2017-11-08 PROCEDURE — 99213 OFFICE O/P EST LOW 20 MIN: CPT | Mod: S$GLB,,, | Performed by: NURSE PRACTITIONER

## 2017-11-08 PROCEDURE — 99999 PR PBB SHADOW E&M-EST. PATIENT-LVL IV: CPT | Mod: PBBFAC,,, | Performed by: NURSE PRACTITIONER

## 2017-11-08 RX ORDER — LOSARTAN POTASSIUM 25 MG/1
25 TABLET ORAL EVERY MORNING
Refills: 3 | COMMUNITY
Start: 2017-09-25

## 2017-11-08 RX ORDER — MYCOPHENOLATE MOFETIL 500 MG/1
500 TABLET ORAL 2 TIMES DAILY
Refills: 0 | COMMUNITY
Start: 2017-10-18

## 2017-11-08 RX ORDER — TERBINAFINE HYDROCHLORIDE 250 MG/1
TABLET ORAL
COMMUNITY
Start: 2017-08-03 | End: 2023-08-29

## 2017-11-08 NOTE — PROGRESS NOTES
"Subjective:      Patient ID: Mc Jeter is a 62 y.o. male.    Chief Complaint: Sleep Apnea    Patient presents to the office today for evaluation of sleep apnea.  He has a long-standing history of snoring and apnea events.  2 siblings with sleep apnea.  Patient with Marfan syndrome and chronic A. Fib.  Patient states he wakes up gasping for air at times.  He has frequent nighttime awakenings.    STOP - BANG Questionnaire:     1. Snoring : Do you snore loudly ?    Yes    2. Tired : Do you often feel tired, fatigued, or sleepy during daytime? Yes    3. Observed: Has anyone observed you stop breathing during your sleep?   Yes     4. Blood pressure : Do you have or are you being treated for high blood pressure?   Yes    5. BMI :BMI more than 35 kg/m2?   No    6. Age : Age over 50 yr old?   Yes    7. Neck circumference: Neck circumference greater than 40 cm?   No    8. Gender: Gender male?   Yes    High risk of RICK: Yes 5 - 8  Intermediate risk of RICK: Yes 3 - 4  Low risk of RICK: Yes 0 - 2      References:   STOP Questionnaire   A Tool to Screen Patients for Obstructive Sleep Apnea: [employer].C.P.C., Aldo Abrams M.B.B.S., Claudette Berg M.D.,Marlys Cervantes, Ph.D., Quentin Gutierrez M.B.B.S.,_ MACK Tay.Sc.,_ Eris Dasilva M.D., Tone Mac F.R.C.P.C.; Anesthesiology 2008; 108:812-21 Copyright © 2008, the American Society of Anesthesiologists, Inc. [employer]          /70   Pulse 87   Resp 17   Ht 6' 5.5" (1.969 m)   Wt 94.8 kg (208 lb 15.9 oz)   SpO2 99%   BMI 24.46 kg/m²   Body mass index is 24.46 kg/m².    Review of Systems   Respiratory: Positive for snoring.    Psychiatric/Behavioral: Positive for sleep disturbance.   All other systems reviewed and are negative.    Objective:      Physical Exam   Constitutional: He is oriented to person, place, and time. He appears well-developed and well-nourished.   HENT:   Head: Normocephalic and atraumatic. "   Mouth/Throat: Oropharynx is clear and moist.   Neck: Normal range of motion. Neck supple.   Cardiovascular: Normal rate and regular rhythm.    Pulmonary/Chest: Effort normal and breath sounds normal.   Musculoskeletal: Normal range of motion. He exhibits no edema.   Neurological: He is alert and oriented to person, place, and time.   Skin: Skin is warm and dry.   Psychiatric: He has a normal mood and affect.     Personal Diagnostic Review  HST positive for sleep apnea.        Assessment:       1. RICK (obstructive sleep apnea)    2. Marfan syndrome        Outpatient Encounter Prescriptions as of 11/8/2017   Medication Sig Dispense Refill    alendronate (FOSAMAX) 70 MG tablet TAKE 1 TABLET (70 MG TOTAL) BY MOUTH EVERY 7 DAYS. 4 tablet 11    aspirin 81 MG Chew Take 81 mg by mouth once daily.      CONTOUR NEXT STRIPS Strp TEST BLOOD SUGAR TWICE A  strip 3    ketoconazole (NIZORAL) 2 % cream APPLY TOPICALLY 2 (TWO) TIMES DAILY. 60 g 5    ketoconazole (NIZORAL) 2 % shampoo USE AS BODY WASH 3X/WEEK - LET SIT FOR AT LEAST 5 MINUTES PRIOR TO RINSING 240 mL 5    lancets (MICROLET LANCET) Misc 1 lancet by Misc.(Non-Drug; Combo Route) route 2 (two) times daily. 100 each 11    metoprolol succinate (TOPROL-XL) 100 MG 24 hr tablet Take 1 tablet (100 mg total) by mouth once daily. 30 tablet 11    multivit,stress formula-zinc (STRESS FORMULA WITH ZINC) Tab Take by mouth.      omalizumab (XOLAIR) 150 mg injection Inject 150 mg into the skin every 28 days.      omega-3 fatty acids-vitamin E (FISH OIL) 1,000 mg Cap Take 2 capsules by mouth once daily. 1 Capsule Oral Twice a day      pioglitazone (ACTOS) 15 MG tablet Take 1 tablet (15 mg total) by mouth once daily. 90 tablet 3    polycarbophil (FIBERCON) 625 mg tablet Take 2 tablets by mouth once daily.       predniSONE (DELTASONE) 1 MG tablet TAKE 4 TABLETS (4 MG TOTAL) BY MOUTH ONCE DAILY. 120 tablet 3    tadalafil (CIALIS) 5 MG tablet Take 1 tablet (5 mg total)  by mouth daily as needed for Erectile Dysfunction. 30 tablet 0    VITAMIN D3 1,000 unit tablet TAKE 1 CAPSULE (1,000 UNITS TOTAL) BY MOUTH ONCE DAILY.  4    cetirizine (ZYRTEC) 10 MG tablet Take 20 mg by mouth 2 (two) times daily.  1    clotrimazole (LOTRIMIN) 1 % cream APPLY TO RASHY AREAS TWICE A DAY FOR 14 DAYS  2    cyproheptadine (PERIACTIN) 4 mg tablet Take 4 mg by mouth 3 (three) times daily as needed.      famotidine (PEPCID) 20 MG tablet Take 20 mg by mouth 2 (two) times daily.      hydrOXYzine HCl (ATARAX) 25 MG tablet Take 25 mg by mouth 2 (two) times daily.       losartan (COZAAR) 25 MG tablet Take 25 mg by mouth every morning.  3    mycophenolate (CELLCEPT) 500 mg Tab Take 500 mg by mouth 2 (two) times daily.  0    predniSONE (DELTASONE) 10 MG tablet Take 3 tablets by mouth every other day.   0    terbinafine HCl (LAMISIL) 250 mg tablet        No facility-administered encounter medications on file as of 11/8/2017.      No orders of the defined types were placed in this encounter.    Plan:      AutoPAP 4-20 cm H2O and follow up in 8 weeks with download of data card and review of symptoms.

## 2017-11-12 ENCOUNTER — PATIENT MESSAGE (OUTPATIENT)
Dept: RHEUMATOLOGY | Facility: CLINIC | Age: 62
End: 2017-11-12

## 2017-11-27 ENCOUNTER — LAB VISIT (OUTPATIENT)
Dept: LAB | Facility: HOSPITAL | Age: 62
End: 2017-11-27
Attending: INTERNAL MEDICINE
Payer: COMMERCIAL

## 2017-11-27 DIAGNOSIS — E78.2 MIXED HYPERLIPIDEMIA: ICD-10-CM

## 2017-11-27 DIAGNOSIS — R73.9 HYPERGLYCEMIA, DRUG-INDUCED: ICD-10-CM

## 2017-11-27 DIAGNOSIS — T50.905A HYPERGLYCEMIA, DRUG-INDUCED: ICD-10-CM

## 2017-11-27 LAB
CHOLEST SERPL-MCNC: 291 MG/DL
CHOLEST/HDLC SERPL: 11.2 {RATIO}
ESTIMATED AVG GLUCOSE: 275 MG/DL
HBA1C MFR BLD HPLC: 11.2 %
HDLC SERPL-MCNC: 26 MG/DL
HDLC SERPL: 8.9 %
LDLC SERPL CALC-MCNC: ABNORMAL MG/DL
NONHDLC SERPL-MCNC: 265 MG/DL
TRIGL SERPL-MCNC: 963 MG/DL

## 2017-11-27 PROCEDURE — 83036 HEMOGLOBIN GLYCOSYLATED A1C: CPT

## 2017-11-27 PROCEDURE — 36415 COLL VENOUS BLD VENIPUNCTURE: CPT | Mod: PO

## 2017-11-27 PROCEDURE — 80061 LIPID PANEL: CPT

## 2017-11-28 ENCOUNTER — PATIENT MESSAGE (OUTPATIENT)
Dept: INTERNAL MEDICINE | Facility: CLINIC | Age: 62
End: 2017-11-28

## 2017-11-28 DIAGNOSIS — E78.5 HYPERLIPIDEMIA, UNSPECIFIED HYPERLIPIDEMIA TYPE: Primary | ICD-10-CM

## 2017-11-28 RX ORDER — ATORVASTATIN CALCIUM 20 MG/1
20 TABLET, FILM COATED ORAL DAILY
Qty: 90 TABLET | Refills: 4 | Status: SHIPPED | OUTPATIENT
Start: 2017-11-28 | End: 2018-12-19 | Stop reason: SDUPTHER

## 2017-12-13 ENCOUNTER — OFFICE VISIT (OUTPATIENT)
Dept: SLEEP MEDICINE | Facility: CLINIC | Age: 62
End: 2017-12-13
Payer: COMMERCIAL

## 2017-12-13 VITALS
WEIGHT: 212.94 LBS | RESPIRATION RATE: 17 BRPM | OXYGEN SATURATION: 97 % | BODY MASS INDEX: 24.64 KG/M2 | HEART RATE: 86 BPM | DIASTOLIC BLOOD PRESSURE: 72 MMHG | SYSTOLIC BLOOD PRESSURE: 112 MMHG | HEIGHT: 78 IN

## 2017-12-13 DIAGNOSIS — G47.33 OSA (OBSTRUCTIVE SLEEP APNEA): Primary | ICD-10-CM

## 2017-12-13 PROCEDURE — 99999 PR PBB SHADOW E&M-EST. PATIENT-LVL V: CPT | Mod: PBBFAC,,, | Performed by: NURSE PRACTITIONER

## 2017-12-13 PROCEDURE — 99213 OFFICE O/P EST LOW 20 MIN: CPT | Mod: S$GLB,,, | Performed by: NURSE PRACTITIONER

## 2017-12-13 RX ORDER — METFORMIN HYDROCHLORIDE 750 MG/1
1000 TABLET, EXTENDED RELEASE ORAL 2 TIMES DAILY WITH MEALS
Status: ON HOLD | COMMUNITY
Start: 2017-12-11 | End: 2023-10-30 | Stop reason: ALTCHOICE

## 2017-12-13 NOTE — PROGRESS NOTES
"Subjective:      Patient ID: Mc Jeter is a 62 y.o. male.    Chief Complaint: Sleep Apnea    Patient with Marfan syndrome and chronic A. Fib. Present for review of autopap. He is still habituating to mask and leaks at times. He is still motivated to continue and hopefully sleep more soundly soon.     Patient Active Problem List:     Marfan syndrome     Retinal detachment     Subluxation of lens     Epiretinal membrane     Posterior vitreous detachment     Chronic atrial fibrillation     Long term current use of systemic steroids     PMR (polymyalgia rheumatica)     Osteopenia     CMC arthritis     Ascending aorta dilatation     Decreased pinch strength     Pain in thumb joint with movement of right hand     Hypocomplementemia     Hyperglycemia, drug-induced     RICK (obstructive sleep apnea)              /72   Pulse 86   Resp 17   Ht 6' 5.6" (1.971 m)   Wt 96.6 kg (212 lb 15.4 oz)   SpO2 97%   BMI 24.86 kg/m²   Body mass index is 24.86 kg/m².    Review of Systems   Constitutional: Negative.    HENT: Negative.    Respiratory: Negative for snoring.    Cardiovascular: Negative.    Psychiatric/Behavioral: Positive for sleep disturbance.     Objective:      Physical Exam   Constitutional: He is oriented to person, place, and time. He appears well-developed and well-nourished.   HENT:   Head: Normocephalic and atraumatic.   Neck: Normal range of motion. Neck supple.   Musculoskeletal: He exhibits no edema.   Neurological: He is alert and oriented to person, place, and time.   Skin: Skin is warm and dry.   Psychiatric: He has a normal mood and affect.     Personal Diagnostic Review  Autopap download: Patient wears on average 4 hrs and 40 minutes. Greater than 4 hrs 80 % of the time. 90% percentile pressure 5.3 with AHI 4.8 events/hr          Assessment:       1. RICK (obstructive sleep apnea)        Outpatient Encounter Prescriptions as of 12/13/2017   Medication Sig Dispense Refill    alendronate (FOSAMAX) " 70 MG tablet TAKE 1 TABLET (70 MG TOTAL) BY MOUTH EVERY 7 DAYS. 4 tablet 11    aspirin 81 MG Chew Take 81 mg by mouth once daily.      atorvastatin (LIPITOR) 20 MG tablet Take 1 tablet (20 mg total) by mouth once daily. 90 tablet 4    cetirizine (ZYRTEC) 10 MG tablet Take 20 mg by mouth 2 (two) times daily.  1    clotrimazole (LOTRIMIN) 1 % cream APPLY TO RASHY AREAS TWICE A DAY FOR 14 DAYS  2    CONTOUR NEXT STRIPS Strp TEST BLOOD SUGAR TWICE A  strip 3    cyproheptadine (PERIACTIN) 4 mg tablet Take 4 mg by mouth 3 (three) times daily as needed.      famotidine (PEPCID) 20 MG tablet Take 20 mg by mouth 2 (two) times daily.      hydrOXYzine HCl (ATARAX) 25 MG tablet Take 25 mg by mouth 2 (two) times daily.       ketoconazole (NIZORAL) 2 % cream APPLY TOPICALLY 2 (TWO) TIMES DAILY. 60 g 5    ketoconazole (NIZORAL) 2 % shampoo USE AS BODY WASH 3X/WEEK - LET SIT FOR AT LEAST 5 MINUTES PRIOR TO RINSING 240 mL 5    lancets (MICROLET LANCET) Misc 1 lancet by Misc.(Non-Drug; Combo Route) route 2 (two) times daily. 100 each 11    losartan (COZAAR) 25 MG tablet Take 25 mg by mouth every morning.  3    metFORMIN (GLUCOPHAGE-XR) 750 MG 24 hr tablet       multivit,stress formula-zinc (STRESS FORMULA WITH ZINC) Tab Take by mouth.      mycophenolate (CELLCEPT) 500 mg Tab Take 500 mg by mouth 2 (two) times daily.  0    omalizumab (XOLAIR) 150 mg injection Inject 150 mg into the skin every 28 days.      omega-3 fatty acids-vitamin E (FISH OIL) 1,000 mg Cap Take 2 capsules by mouth once daily. 1 Capsule Oral Twice a day      pioglitazone (ACTOS) 15 MG tablet Take 1 tablet (15 mg total) by mouth once daily. 90 tablet 3    polycarbophil (FIBERCON) 625 mg tablet Take 2 tablets by mouth once daily.       tadalafil (CIALIS) 5 MG tablet Take 1 tablet (5 mg total) by mouth daily as needed for Erectile Dysfunction. 30 tablet 0    terbinafine HCl (LAMISIL) 250 mg tablet       VITAMIN D3 1,000 unit tablet TAKE 1  CAPSULE (1,000 UNITS TOTAL) BY MOUTH ONCE DAILY.  4    metoprolol succinate (TOPROL-XL) 100 MG 24 hr tablet Take 1 tablet (100 mg total) by mouth once daily. 30 tablet 11    predniSONE (DELTASONE) 1 MG tablet TAKE 4 TABLETS (4 MG TOTAL) BY MOUTH ONCE DAILY. 120 tablet 3    predniSONE (DELTASONE) 10 MG tablet Take 3 tablets by mouth every other day.   0     No facility-administered encounter medications on file as of 12/13/2017.      No orders of the defined types were placed in this encounter.    Plan:      Review of download. I am going to change to CPAP 6 and review AHI shortly.   Patient is compliant. Follow up in 6 months with PAP data download or call earlier if any problems.

## 2018-01-20 DIAGNOSIS — B36.0 TINEA VERSICOLOR: ICD-10-CM

## 2018-01-22 RX ORDER — KETOCONAZOLE 20 MG/G
CREAM TOPICAL 2 TIMES DAILY
Qty: 60 G | Refills: 5 | Status: SHIPPED | OUTPATIENT
Start: 2018-01-22

## 2018-02-04 DIAGNOSIS — B36.0 TINEA VERSICOLOR: ICD-10-CM

## 2018-02-05 RX ORDER — KETOCONAZOLE 20 MG/ML
SHAMPOO, SUSPENSION TOPICAL
Qty: 240 ML | Refills: 5 | Status: SHIPPED | OUTPATIENT
Start: 2018-02-05 | End: 2018-07-29 | Stop reason: SDUPTHER

## 2018-06-22 DIAGNOSIS — E11.9 TYPE 2 DIABETES MELLITUS WITHOUT COMPLICATION: ICD-10-CM

## 2018-06-27 RX ORDER — METOPROLOL SUCCINATE 100 MG/1
100 TABLET, EXTENDED RELEASE ORAL DAILY
Qty: 30 TABLET | Refills: 2 | Status: SHIPPED | OUTPATIENT
Start: 2018-06-27 | End: 2018-09-24 | Stop reason: SDUPTHER

## 2018-07-05 ENCOUNTER — PATIENT MESSAGE (OUTPATIENT)
Dept: ADMINISTRATIVE | Facility: HOSPITAL | Age: 63
End: 2018-07-05

## 2018-07-12 RX ORDER — PIOGLITAZONEHYDROCHLORIDE 15 MG/1
15 TABLET ORAL DAILY
Qty: 90 TABLET | Refills: 0 | Status: SHIPPED | OUTPATIENT
Start: 2018-07-12 | End: 2018-10-08 | Stop reason: SDUPTHER

## 2018-07-12 NOTE — TELEPHONE ENCOUNTER
Patient has not seen Dr. Adams in over a year.  Refill is given but the patient needs an appointment for an updated physical.

## 2018-07-29 DIAGNOSIS — B36.0 TINEA VERSICOLOR: ICD-10-CM

## 2018-07-30 RX ORDER — KETOCONAZOLE 20 MG/ML
SHAMPOO, SUSPENSION TOPICAL
Qty: 240 ML | Refills: 5 | Status: SHIPPED | OUTPATIENT
Start: 2018-07-30

## 2018-09-24 RX ORDER — METOPROLOL SUCCINATE 100 MG/1
100 TABLET, EXTENDED RELEASE ORAL DAILY
Qty: 30 TABLET | Refills: 0 | Status: SHIPPED | OUTPATIENT
Start: 2018-09-24 | End: 2018-10-24 | Stop reason: SDUPTHER

## 2018-10-08 RX ORDER — PIOGLITAZONEHYDROCHLORIDE 15 MG/1
15 TABLET ORAL DAILY
Qty: 90 TABLET | Refills: 0 | Status: SHIPPED | OUTPATIENT
Start: 2018-10-08 | End: 2019-01-05 | Stop reason: SDUPTHER

## 2018-10-25 RX ORDER — METOPROLOL SUCCINATE 100 MG/1
TABLET, EXTENDED RELEASE ORAL
Qty: 30 TABLET | Refills: 0 | Status: SHIPPED | OUTPATIENT
Start: 2018-10-25 | End: 2018-12-08 | Stop reason: SDUPTHER

## 2018-11-24 RX ORDER — METOPROLOL SUCCINATE 100 MG/1
TABLET, EXTENDED RELEASE ORAL
Qty: 30 TABLET | Refills: 0 | OUTPATIENT
Start: 2018-11-24

## 2018-12-10 RX ORDER — METOPROLOL SUCCINATE 100 MG/1
TABLET, EXTENDED RELEASE ORAL
Qty: 30 TABLET | Refills: 0 | Status: SHIPPED | OUTPATIENT
Start: 2018-12-10 | End: 2019-01-05 | Stop reason: SDUPTHER

## 2018-12-19 DIAGNOSIS — E78.5 HYPERLIPIDEMIA, UNSPECIFIED HYPERLIPIDEMIA TYPE: ICD-10-CM

## 2018-12-19 RX ORDER — ATORVASTATIN CALCIUM 20 MG/1
20 TABLET, FILM COATED ORAL DAILY
Qty: 90 TABLET | Refills: 0 | Status: SHIPPED | OUTPATIENT
Start: 2018-12-19 | End: 2019-03-30 | Stop reason: SDUPTHER

## 2019-01-06 RX ORDER — METOPROLOL SUCCINATE 100 MG/1
TABLET, EXTENDED RELEASE ORAL
Qty: 30 TABLET | Refills: 0 | Status: SHIPPED | OUTPATIENT
Start: 2019-01-06 | End: 2019-02-03 | Stop reason: SDUPTHER

## 2019-01-06 RX ORDER — PIOGLITAZONEHYDROCHLORIDE 15 MG/1
15 TABLET ORAL DAILY
Qty: 90 TABLET | Refills: 0 | Status: SHIPPED | OUTPATIENT
Start: 2019-01-06 | End: 2019-04-04 | Stop reason: SDUPTHER

## 2019-02-04 RX ORDER — METOPROLOL SUCCINATE 100 MG/1
TABLET, EXTENDED RELEASE ORAL
Qty: 30 TABLET | Refills: 0 | Status: SHIPPED | OUTPATIENT
Start: 2019-02-04 | End: 2019-03-10 | Stop reason: SDUPTHER

## 2019-03-10 RX ORDER — METOPROLOL SUCCINATE 100 MG/1
TABLET, EXTENDED RELEASE ORAL
Qty: 30 TABLET | Refills: 0 | Status: SHIPPED | OUTPATIENT
Start: 2019-03-10 | End: 2019-04-16 | Stop reason: SDUPTHER

## 2019-03-16 DIAGNOSIS — E78.5 HYPERLIPIDEMIA, UNSPECIFIED HYPERLIPIDEMIA TYPE: ICD-10-CM

## 2019-03-18 RX ORDER — ATORVASTATIN CALCIUM 20 MG/1
20 TABLET, FILM COATED ORAL DAILY
Qty: 90 TABLET | Refills: 0 | OUTPATIENT
Start: 2019-03-18

## 2019-03-30 DIAGNOSIS — E78.5 HYPERLIPIDEMIA, UNSPECIFIED HYPERLIPIDEMIA TYPE: ICD-10-CM

## 2019-03-31 RX ORDER — ATORVASTATIN CALCIUM 20 MG/1
20 TABLET, FILM COATED ORAL DAILY
Qty: 90 TABLET | Refills: 0 | Status: SHIPPED | OUTPATIENT
Start: 2019-03-31 | End: 2019-07-06 | Stop reason: SDUPTHER

## 2019-04-04 RX ORDER — PIOGLITAZONEHYDROCHLORIDE 15 MG/1
15 TABLET ORAL DAILY
Qty: 90 TABLET | Refills: 0 | Status: SHIPPED | OUTPATIENT
Start: 2019-04-04 | End: 2019-06-26 | Stop reason: SDUPTHER

## 2019-04-16 RX ORDER — METOPROLOL SUCCINATE 100 MG/1
TABLET, EXTENDED RELEASE ORAL
Qty: 30 TABLET | Refills: 0 | Status: SHIPPED | OUTPATIENT
Start: 2019-04-16 | End: 2019-05-22 | Stop reason: SDUPTHER

## 2019-05-22 RX ORDER — METOPROLOL SUCCINATE 100 MG/1
TABLET, EXTENDED RELEASE ORAL
Qty: 30 TABLET | Refills: 0 | Status: SHIPPED | OUTPATIENT
Start: 2019-05-22 | End: 2019-06-17 | Stop reason: SDUPTHER

## 2019-05-24 ENCOUNTER — PATIENT OUTREACH (OUTPATIENT)
Dept: ADMINISTRATIVE | Facility: HOSPITAL | Age: 64
End: 2019-05-24

## 2019-06-17 RX ORDER — METOPROLOL SUCCINATE 100 MG/1
TABLET, EXTENDED RELEASE ORAL
Qty: 30 TABLET | Refills: 0 | Status: SHIPPED | OUTPATIENT
Start: 2019-06-17 | End: 2019-07-02 | Stop reason: SDUPTHER

## 2019-06-26 RX ORDER — PIOGLITAZONEHYDROCHLORIDE 15 MG/1
15 TABLET ORAL DAILY
Qty: 90 TABLET | Refills: 0 | Status: SHIPPED | OUTPATIENT
Start: 2019-06-26 | End: 2023-08-29

## 2019-07-02 RX ORDER — METOPROLOL SUCCINATE 100 MG/1
100 TABLET, EXTENDED RELEASE ORAL DAILY
Qty: 90 TABLET | Refills: 0 | Status: SHIPPED | OUTPATIENT
Start: 2019-07-02

## 2019-07-06 DIAGNOSIS — E78.5 HYPERLIPIDEMIA, UNSPECIFIED HYPERLIPIDEMIA TYPE: ICD-10-CM

## 2019-07-08 RX ORDER — ATORVASTATIN CALCIUM 20 MG/1
20 TABLET, FILM COATED ORAL DAILY
Qty: 90 TABLET | Refills: 0 | Status: SHIPPED | OUTPATIENT
Start: 2019-07-08

## 2019-07-29 ENCOUNTER — PATIENT OUTREACH (OUTPATIENT)
Dept: ADMINISTRATIVE | Facility: HOSPITAL | Age: 64
End: 2019-07-29

## 2019-07-29 NOTE — PROGRESS NOTES
L/m for pt to call office       Anna Marie CHARLES LPN Care Coordinator  Care Coordination Department  [facility]  [phone]

## 2019-08-02 ENCOUNTER — PATIENT OUTREACH (OUTPATIENT)
Dept: ADMINISTRATIVE | Facility: HOSPITAL | Age: 64
End: 2019-08-02

## 2019-08-02 NOTE — PROGRESS NOTES
LESLIE for pt to call office       Anna Marie CHARLES LPN Care Coordinator  Care Coordination Department  [facility]  [phone]

## 2019-10-06 DIAGNOSIS — E78.5 HYPERLIPIDEMIA, UNSPECIFIED HYPERLIPIDEMIA TYPE: ICD-10-CM

## 2019-10-06 RX ORDER — ATORVASTATIN CALCIUM 20 MG/1
20 TABLET, FILM COATED ORAL DAILY
Qty: 90 TABLET | Refills: 0 | OUTPATIENT
Start: 2019-10-06

## 2019-10-06 RX ORDER — METOPROLOL SUCCINATE 100 MG/1
TABLET, EXTENDED RELEASE ORAL
Qty: 90 TABLET | Refills: 0 | OUTPATIENT
Start: 2019-10-06

## 2019-10-06 RX ORDER — PIOGLITAZONEHYDROCHLORIDE 15 MG/1
15 TABLET ORAL DAILY
Qty: 90 TABLET | Refills: 0 | OUTPATIENT
Start: 2019-10-06 | End: 2020-01-04

## 2019-11-20 DIAGNOSIS — E78.5 HYPERLIPIDEMIA, UNSPECIFIED HYPERLIPIDEMIA TYPE: ICD-10-CM

## 2019-11-20 RX ORDER — PIOGLITAZONEHYDROCHLORIDE 15 MG/1
15 TABLET ORAL DAILY
Qty: 90 TABLET | Refills: 0 | OUTPATIENT
Start: 2019-11-20 | End: 2020-02-18

## 2019-11-20 RX ORDER — ATORVASTATIN CALCIUM 20 MG/1
20 TABLET, FILM COATED ORAL DAILY
Qty: 90 TABLET | Refills: 0 | OUTPATIENT
Start: 2019-11-20

## 2021-09-09 ENCOUNTER — PATIENT MESSAGE (OUTPATIENT)
Dept: OPHTHALMOLOGY | Facility: CLINIC | Age: 66
End: 2021-09-09

## 2021-10-27 ENCOUNTER — OFFICE VISIT (OUTPATIENT)
Dept: OPHTHALMOLOGY | Facility: CLINIC | Age: 66
End: 2021-10-27
Payer: MEDICARE

## 2021-10-27 DIAGNOSIS — Q12.1 ECTOPIA LENTIS: Primary | ICD-10-CM

## 2021-10-27 DIAGNOSIS — Q87.40 MARFAN SYNDROME: ICD-10-CM

## 2021-10-27 DIAGNOSIS — H25.11 NUCLEAR SCLEROSIS OF RIGHT EYE: ICD-10-CM

## 2021-10-27 DIAGNOSIS — Z98.890 S/P LASIK (LASER ASSISTED IN SITU KERATOMILEUSIS): ICD-10-CM

## 2021-10-27 PROCEDURE — 1159F MED LIST DOCD IN RCRD: CPT | Mod: S$GLB,,, | Performed by: OPHTHALMOLOGY

## 2021-10-27 PROCEDURE — 1160F RVW MEDS BY RX/DR IN RCRD: CPT | Mod: S$GLB,,, | Performed by: OPHTHALMOLOGY

## 2021-10-27 PROCEDURE — 4010F ACE/ARB THERAPY RXD/TAKEN: CPT | Mod: S$GLB,,, | Performed by: OPHTHALMOLOGY

## 2021-10-27 PROCEDURE — 1159F PR MEDICATION LIST DOCUMENTED IN MEDICAL RECORD: ICD-10-PCS | Mod: S$GLB,,, | Performed by: OPHTHALMOLOGY

## 2021-10-27 PROCEDURE — 92136 OPHTHALMIC BIOMETRY: CPT | Mod: RT,S$GLB,, | Performed by: OPHTHALMOLOGY

## 2021-10-27 PROCEDURE — 1160F PR REVIEW ALL MEDS BY PRESCRIBER/CLIN PHARMACIST DOCUMENTED: ICD-10-PCS | Mod: S$GLB,,, | Performed by: OPHTHALMOLOGY

## 2021-10-27 PROCEDURE — 99999 PR PBB SHADOW E&M-EST. PATIENT-LVL IV: ICD-10-PCS | Mod: PBBFAC,,, | Performed by: OPHTHALMOLOGY

## 2021-10-27 PROCEDURE — 4010F PR ACE/ARB THEARPY RXD/TAKEN: ICD-10-PCS | Mod: S$GLB,,, | Performed by: OPHTHALMOLOGY

## 2021-10-27 PROCEDURE — 99204 OFFICE O/P NEW MOD 45 MIN: CPT | Mod: S$GLB,,, | Performed by: OPHTHALMOLOGY

## 2021-10-27 PROCEDURE — 92136 IOL MASTER - OU - BOTH EYES: ICD-10-PCS | Mod: RT,S$GLB,, | Performed by: OPHTHALMOLOGY

## 2021-10-27 PROCEDURE — 99204 PR OFFICE/OUTPT VISIT, NEW, LEVL IV, 45-59 MIN: ICD-10-PCS | Mod: S$GLB,,, | Performed by: OPHTHALMOLOGY

## 2021-10-27 PROCEDURE — 1126F AMNT PAIN NOTED NONE PRSNT: CPT | Mod: S$GLB,,, | Performed by: OPHTHALMOLOGY

## 2021-10-27 PROCEDURE — 1126F PR PAIN SEVERITY QUANTIFIED, NO PAIN PRESENT: ICD-10-PCS | Mod: S$GLB,,, | Performed by: OPHTHALMOLOGY

## 2021-10-27 PROCEDURE — 99999 PR PBB SHADOW E&M-EST. PATIENT-LVL IV: CPT | Mod: PBBFAC,,, | Performed by: OPHTHALMOLOGY

## 2021-10-27 RX ORDER — SODIUM CHLORIDE 0.9 % (FLUSH) 0.9 %
10 SYRINGE (ML) INJECTION
Status: DISCONTINUED | OUTPATIENT
Start: 2021-10-27 | End: 2023-10-30 | Stop reason: ALTCHOICE

## 2021-10-27 RX ORDER — TROPICAMIDE 10 MG/ML
1 SOLUTION/ DROPS OPHTHALMIC
Status: CANCELLED | OUTPATIENT
Start: 2021-10-27

## 2021-10-27 RX ORDER — PREDNISOLONE ACETATE-GATIFLOXACIN-BROMFENAC .75; 5; 1 MG/ML; MG/ML; MG/ML
1 SUSPENSION/ DROPS OPHTHALMIC 3 TIMES DAILY
Qty: 5 ML | Refills: 3 | Status: SHIPPED | OUTPATIENT
Start: 2021-10-27 | End: 2022-03-10 | Stop reason: ALTCHOICE

## 2021-10-27 RX ORDER — PHENYLEPHRINE HYDROCHLORIDE 25 MG/ML
1 SOLUTION/ DROPS OPHTHALMIC
Status: CANCELLED | OUTPATIENT
Start: 2021-10-27

## 2021-10-27 RX ORDER — MOXIFLOXACIN 5 MG/ML
1 SOLUTION/ DROPS OPHTHALMIC
Status: CANCELLED | OUTPATIENT
Start: 2021-10-27

## 2021-10-27 RX ORDER — TETRACAINE HYDROCHLORIDE 5 MG/ML
1 SOLUTION OPHTHALMIC
Status: CANCELLED | OUTPATIENT
Start: 2021-10-27

## 2022-01-06 ENCOUNTER — TELEPHONE (OUTPATIENT)
Dept: OPHTHALMOLOGY | Facility: CLINIC | Age: 67
End: 2022-01-06
Payer: MEDICARE

## 2022-01-06 DIAGNOSIS — Q12.1 ECTOPIA LENTIS: ICD-10-CM

## 2022-01-06 DIAGNOSIS — H25.11 NUCLEAR SCLEROSIS OF RIGHT EYE: Primary | ICD-10-CM

## 2022-01-18 ENCOUNTER — TELEPHONE (OUTPATIENT)
Dept: OPHTHALMOLOGY | Facility: CLINIC | Age: 67
End: 2022-01-18
Payer: MEDICARE

## 2022-01-18 DIAGNOSIS — Q12.1 ECTOPIA LENTIS: Primary | ICD-10-CM

## 2022-01-19 RX ORDER — SITAGLIPTIN 100 MG/1
100 TABLET, FILM COATED ORAL DAILY
COMMUNITY
Start: 2021-11-30 | End: 2023-12-08

## 2022-01-19 RX ORDER — APIXABAN 5 MG/1
5 TABLET, FILM COATED ORAL 2 TIMES DAILY
COMMUNITY
Start: 2021-12-22

## 2022-01-20 ENCOUNTER — TELEPHONE (OUTPATIENT)
Dept: OPHTHALMOLOGY | Facility: CLINIC | Age: 67
End: 2022-01-20
Payer: MEDICARE

## 2022-01-20 NOTE — TELEPHONE ENCOUNTER
Patient given arrival time of 10:30 on Monday January 24 . Nothing to eat or drink after 9 pm.  Water, Gatorade after 9 pm until leaves home.  Start drops into the operative eye today. [address]

## 2022-01-21 ENCOUNTER — LAB VISIT (OUTPATIENT)
Dept: PRIMARY CARE CLINIC | Facility: CLINIC | Age: 67
End: 2022-01-21
Payer: MEDICARE

## 2022-01-21 DIAGNOSIS — Q12.1 ECTOPIA LENTIS: ICD-10-CM

## 2022-01-21 PROCEDURE — U0005 INFEC AGEN DETEC AMPLI PROBE: HCPCS | Performed by: OPHTHALMOLOGY

## 2022-01-21 PROCEDURE — U0003 INFECTIOUS AGENT DETECTION BY NUCLEIC ACID (DNA OR RNA); SEVERE ACUTE RESPIRATORY SYNDROME CORONAVIRUS 2 (SARS-COV-2) (CORONAVIRUS DISEASE [COVID-19]), AMPLIFIED PROBE TECHNIQUE, MAKING USE OF HIGH THROUGHPUT TECHNOLOGIES AS DESCRIBED BY CMS-2020-01-R: HCPCS | Performed by: OPHTHALMOLOGY

## 2022-01-22 LAB
SARS-COV-2 RNA RESP QL NAA+PROBE: NOT DETECTED
SARS-COV-2- CYCLE NUMBER: NORMAL

## 2022-01-24 ENCOUNTER — HOSPITAL ENCOUNTER (OUTPATIENT)
Facility: OTHER | Age: 67
Discharge: HOME | End: 2022-01-24
Attending: OPHTHALMOLOGY | Admitting: OPHTHALMOLOGY
Payer: MEDICARE

## 2022-01-24 ENCOUNTER — ANESTHESIA (OUTPATIENT)
Dept: SURGERY | Facility: OTHER | Age: 67
End: 2022-01-24
Payer: MEDICARE

## 2022-01-24 ENCOUNTER — ANESTHESIA EVENT (OUTPATIENT)
Dept: SURGERY | Facility: OTHER | Age: 67
End: 2022-01-24
Payer: MEDICARE

## 2022-01-24 VITALS
TEMPERATURE: 98 F | HEIGHT: 78 IN | WEIGHT: 215 LBS | BODY MASS INDEX: 24.88 KG/M2 | DIASTOLIC BLOOD PRESSURE: 84 MMHG | RESPIRATION RATE: 18 BRPM | SYSTOLIC BLOOD PRESSURE: 156 MMHG | OXYGEN SATURATION: 95 % | HEART RATE: 56 BPM

## 2022-01-24 DIAGNOSIS — H25.11 NUCLEAR SCLEROSIS OF RIGHT EYE: ICD-10-CM

## 2022-01-24 DIAGNOSIS — Q12.1 ECTOPIA LENTIS: ICD-10-CM

## 2022-01-24 DIAGNOSIS — H25.11 NUCLEAR SCLEROTIC CATARACT OF RIGHT EYE: Primary | ICD-10-CM

## 2022-01-24 LAB — POCT GLUCOSE: 143 MG/DL (ref 70–110)

## 2022-01-24 PROCEDURE — 36000707: Performed by: OPHTHALMOLOGY

## 2022-01-24 PROCEDURE — 63600175 PHARM REV CODE 636 W HCPCS: Performed by: NURSE ANESTHETIST, CERTIFIED REGISTERED

## 2022-01-24 PROCEDURE — 66999 UNLISTED PX ANT SEGMENT EYE: CPT | Mod: CSM,RT,, | Performed by: OPHTHALMOLOGY

## 2022-01-24 PROCEDURE — 25000003 PHARM REV CODE 250: Performed by: OPHTHALMOLOGY

## 2022-01-24 PROCEDURE — V2632 POST CHMBR INTRAOCULAR LENS: HCPCS | Performed by: OPHTHALMOLOGY

## 2022-01-24 PROCEDURE — 66999 PR FEMTO LRI: ICD-10-PCS | Mod: CSM,RT,, | Performed by: OPHTHALMOLOGY

## 2022-01-24 PROCEDURE — 27201423 OPTIME MED/SURG SUP & DEVICES STERILE SUPPLY: Performed by: OPHTHALMOLOGY

## 2022-01-24 PROCEDURE — 66982 XCAPSL CTRC RMVL CPLX WO ECP: CPT | Mod: RT,,, | Performed by: OPHTHALMOLOGY

## 2022-01-24 PROCEDURE — 37000009 HC ANESTHESIA EA ADD 15 MINS: Performed by: OPHTHALMOLOGY

## 2022-01-24 PROCEDURE — 37000008 HC ANESTHESIA 1ST 15 MINUTES: Performed by: OPHTHALMOLOGY

## 2022-01-24 PROCEDURE — 66982 PR REMOVAL, CATARACT, W/INSRT INTRAOC LENS, W/O ENDO CYCLO, CMPLX: ICD-10-PCS | Mod: RT,,, | Performed by: OPHTHALMOLOGY

## 2022-01-24 PROCEDURE — 71000015 HC POSTOP RECOV 1ST HR: Performed by: OPHTHALMOLOGY

## 2022-01-24 PROCEDURE — 36000706: Performed by: OPHTHALMOLOGY

## 2022-01-24 DEVICE — LENS CLAREON AUTONOME 24.0D: Type: IMPLANTABLE DEVICE | Site: EYE | Status: FUNCTIONAL

## 2022-01-24 RX ORDER — MIDAZOLAM HYDROCHLORIDE 1 MG/ML
INJECTION INTRAMUSCULAR; INTRAVENOUS
Status: DISCONTINUED | OUTPATIENT
Start: 2022-01-24 | End: 2022-01-24

## 2022-01-24 RX ORDER — LIDOCAINE HYDROCHLORIDE 40 MG/ML
INJECTION, SOLUTION RETROBULBAR
Status: DISCONTINUED | OUTPATIENT
Start: 2022-01-24 | End: 2022-01-24 | Stop reason: HOSPADM

## 2022-01-24 RX ORDER — PHENYLEPHRINE HYDROCHLORIDE 25 MG/ML
1 SOLUTION/ DROPS OPHTHALMIC
Status: COMPLETED | OUTPATIENT
Start: 2022-01-24 | End: 2022-01-24

## 2022-01-24 RX ORDER — PHENYLEPHRINE HYDROCHLORIDE 100 MG/ML
1 SOLUTION/ DROPS OPHTHALMIC ONCE
Status: COMPLETED | OUTPATIENT
Start: 2022-01-24 | End: 2022-01-24

## 2022-01-24 RX ORDER — MOXIFLOXACIN 5 MG/ML
1 SOLUTION/ DROPS OPHTHALMIC
Status: COMPLETED | OUTPATIENT
Start: 2022-01-24 | End: 2022-01-24

## 2022-01-24 RX ORDER — HYDROCODONE BITARTRATE AND ACETAMINOPHEN 5; 325 MG/1; MG/1
1 TABLET ORAL EVERY 4 HOURS PRN
Status: DISCONTINUED | OUTPATIENT
Start: 2022-01-24 | End: 2022-01-24 | Stop reason: HOSPADM

## 2022-01-24 RX ORDER — FENTANYL CITRATE 50 UG/ML
INJECTION, SOLUTION INTRAMUSCULAR; INTRAVENOUS
Status: DISCONTINUED | OUTPATIENT
Start: 2022-01-24 | End: 2022-01-24

## 2022-01-24 RX ORDER — MOXIFLOXACIN 5 MG/ML
SOLUTION/ DROPS OPHTHALMIC
Status: DISCONTINUED | OUTPATIENT
Start: 2022-01-24 | End: 2022-01-24 | Stop reason: HOSPADM

## 2022-01-24 RX ORDER — ACETAMINOPHEN 325 MG/1
650 TABLET ORAL EVERY 4 HOURS PRN
Status: DISCONTINUED | OUTPATIENT
Start: 2022-01-24 | End: 2022-01-24 | Stop reason: HOSPADM

## 2022-01-24 RX ORDER — TETRACAINE HYDROCHLORIDE 5 MG/ML
SOLUTION OPHTHALMIC
Status: DISCONTINUED | OUTPATIENT
Start: 2022-01-24 | End: 2022-01-24 | Stop reason: HOSPADM

## 2022-01-24 RX ORDER — BUPIVACAINE HYDROCHLORIDE 7.5 MG/ML
INJECTION, SOLUTION EPIDURAL; RETROBULBAR
Status: DISCONTINUED | OUTPATIENT
Start: 2022-01-24 | End: 2022-01-24 | Stop reason: HOSPADM

## 2022-01-24 RX ORDER — LIDOCAINE HYDROCHLORIDE 20 MG/ML
INJECTION, SOLUTION EPIDURAL; INFILTRATION; INTRACAUDAL; PERINEURAL
Status: DISCONTINUED | OUTPATIENT
Start: 2022-01-24 | End: 2022-01-24 | Stop reason: HOSPADM

## 2022-01-24 RX ORDER — TROPICAMIDE 10 MG/ML
1 SOLUTION/ DROPS OPHTHALMIC
Status: COMPLETED | OUTPATIENT
Start: 2022-01-24 | End: 2022-01-24

## 2022-01-24 RX ORDER — TETRACAINE HYDROCHLORIDE 5 MG/ML
1 SOLUTION OPHTHALMIC
Status: COMPLETED | OUTPATIENT
Start: 2022-01-24 | End: 2022-01-24

## 2022-01-24 RX ADMIN — TETRACAINE HYDROCHLORIDE 1 DROP: 5 SOLUTION OPHTHALMIC at 11:01

## 2022-01-24 RX ADMIN — MOXIFLOXACIN 1 DROP: 5 SOLUTION/ DROPS OPHTHALMIC at 11:01

## 2022-01-24 RX ADMIN — FENTANYL CITRATE 100 MCG: 50 INJECTION, SOLUTION INTRAMUSCULAR; INTRAVENOUS at 12:01

## 2022-01-24 RX ADMIN — MIDAZOLAM HYDROCHLORIDE 2 MG: 1 INJECTION, SOLUTION INTRAMUSCULAR; INTRAVENOUS at 12:01

## 2022-01-24 RX ADMIN — PHENYLEPHRINE HYDROCHLORIDE 1 DROP: 25 SOLUTION/ DROPS OPHTHALMIC at 11:01

## 2022-01-24 RX ADMIN — TROPICAMIDE 1 DROP: 10 SOLUTION/ DROPS OPHTHALMIC at 11:01

## 2022-01-24 NOTE — ANESTHESIA PREPROCEDURE EVALUATION
01/24/2022  Mc Jeter is a 66 y.o., male.    Anesthesia Evaluation    I have reviewed the Patient Summary Reports.    I have reviewed the Nursing Notes.    I have reviewed the Medications.     Review of Systems  Anesthesia Hx:  PONV History of prior surgery of interest to airway management or planning: Denies Family Hx of Anesthesia complications.  Personal Hx of Anesthesia complications, Post-Operative Nausea/Vomiting, in the past, but not with recent anesthetics / prophylaxis Slow To Awaken/Delayed Emergence and moderate, did not delay extubation, but prolonged PACU stay   Social:  Non-Smoker    Hematology/Oncology:  Hematology Normal   Oncology Normal     EENT/Dental:EENT/Dental Normal   Cardiovascular:   Hypertension Dysrhythmias atrial fibrillation Marfans Syndrome   Pulmonary:   Sleep Apnea    Renal/:   Chronic Renal Disease    Hepatic/GI:   Hepatitis    Musculoskeletal:  Musculoskeletal Normal    Neurological:  Neurology Normal    Endocrine:   Diabetes, type 2    Dermatological:  Skin Normal        Physical Exam  General:  Well nourished    Airway/Jaw/Neck:  Airway Findings: Mouth Opening: Normal Tongue: Normal  General Airway Assessment: Adult  Mallampati: II      Dental:  Dental Findings: In tact        Mental Status:  Mental Status Findings:  Cooperative, Alert and Oriented         Anesthesia Plan  Type of Anesthesia, risks & benefits discussed:  Anesthesia Type:  MAC    Patient's Preference:   Plan Factors:          Intra-op Monitoring Plan: standard ASA monitors  Intra-op Monitoring Plan Comments:   Post Op Pain Control Plan: per primary service following discharge from PACU  Post Op Pain Control Plan Comments:     Induction:    Beta Blocker:         Informed Consent: Patient understands risks and agrees with Anesthesia plan.  Questions answered.   ASA Score: 3     Day of Surgery Review of  History & Physical:    H&P update referred to the surgeon.         Ready For Surgery From Anesthesia Perspective.

## 2022-01-24 NOTE — OP NOTE
SURGEON:  Amaya Gallardo M.D.    PREOPERATIVE DIAGNOSIS:    Nuclear Sclerotic Cataract OD  Ectopia Lentis  Marfan's Syndrome    POSTOPERATIVE DIAGNOSIS:    Nuclear Sclerotic Cataract OD  Ectopia Lentis  Marfan's Syndrome    PROCEDURES:    Complex Phacoemulsification with  intraocular lens, right eye (09823)  Implantation of CTR OD    DATE OF SURGERY: 01/24/2022    IMPLANT: CTR Type 14C  CNA0T0 24.0    ANESTHESIA:  MAC with topical Lidocaine    COMPLICATIONS:  None    ESTIMATED BLOOD LOSS: None    SPECIMENS: None    INDICATIONS:    The patient has a history of painless progressive visual loss and difficulty with activities of daily living, which specifically include difficult driving at night due to glare and difficulty reading small print, secondary to cataract formation. After a thorough discussion of the risks, benefits, and alternatives to cataract surgery, including, but not limited to, the rare risks of infection, retinal detachment, hemorrhage, need for additional surgery, loss of vision, and even loss of the eye, the patient voices understanding and desires to proceed.    DESCRIPTION OF PROCEDURE:    The patients IOL calculations were reviewed, and the lens selection confirmed.   After verification and marking of the proper eye in the preop holding area, the patient was brought to the operating room in supine position where the eye was prepped and draped in standard sterile fashion with 5% Betadine and a lid speculum placed in the eye.   Topical 4% Lidocaine was used in addition to the preoperative anesthesia and the procedure was begun by the creation of a paracentesis incision through which viscoelastic was used to fill the anterior chamber.  Next, a keratome blade was used to create a triplanar temporal clear corneal incision and a cystotome and Utrata forceps used to fashion a continuous curvilinear capsulorrhexis.  Hydrodissection was carried out using the Antonio hydrodissection cannula and the nucleus was  found to be mobile.  Phacoemulsification of the nucleus was carried out using a quick chop technique, and all remaining epinuclear and cortical material was removed.  The eye was then reformed with Viscoelastic and the  intraocular lens was implanted into the capsular bag.  All remaining viscoelastics were removed from the eye and at the end of the case the pupil was round, the lens was well-centered within the capsular bag and all wounds were found to be water tight.  Drops of Vigamox and Pred Forte were instilled and a shield was placed over the eye. The patient will follow up with Dr. Gallardo in the morning.    Addendum:  Because there was significant zonular weakness noted during cataract removal, a capsular tension ring was implanted prior to lens insertion. A Morcher Type 14C ring was used.

## 2022-01-24 NOTE — DISCHARGE SUMMARY
Outcome: Successful outpatient ophthalmic surgical procedure  Preprinted Instructions given to patient.  Regular diet.  Activity: No restrictions  Meds: see Med Rec  Condition: stable  Follow up: 1 day with Dr Gallardo  Disposition: Home  Diagnosis: s/p eye surgery

## 2022-01-24 NOTE — ANESTHESIA POSTPROCEDURE EVALUATION
Anesthesia Post Evaluation    Patient: Mc Jeter    Procedure(s) Performed: Procedure(s) (LRB):  EXTRACTION, CATARACT, WITH IOL INSERTION (Right)    Final Anesthesia Type: MAC      Patient location during evaluation: [facility]  Patient participation: Yes- Able to Participate  Level of consciousness: awake and alert and oriented  Post-procedure vital signs: reviewed and stable  Pain management: adequate  Airway patency: patent    PONV status at discharge: No PONV  Anesthetic complications: no      Cardiovascular status: stable, hemodynamically stable and blood pressure returned to baseline  Respiratory status: unassisted, spontaneous ventilation and room air  Hydration status: euvolemic  Follow-up not needed.          Vitals Value Taken Time   /87 01/24/22 1110   Temp 36.3 °C (97.3 °F) 01/24/22 1110   Pulse 59 01/24/22 1110   Resp 18 01/24/22 1110   SpO2 98 % 01/24/22 1110         No case tracking events are documented in the log.      Pain/Armin Score: No data recorded

## 2022-01-24 NOTE — PLAN OF CARE
Mc Jeter has met all discharge criteria from Phase II. Vital Signs are stable, ambulating  without difficulty. Discharge instructions given, patient verbalized understanding. Discharged from facility via wheelchair in stable condition.

## 2022-01-25 ENCOUNTER — OFFICE VISIT (OUTPATIENT)
Dept: OPHTHALMOLOGY | Facility: CLINIC | Age: 67
End: 2022-01-25
Attending: OPHTHALMOLOGY
Payer: MEDICARE

## 2022-01-25 DIAGNOSIS — Z98.890 POST-OPERATIVE STATE: Primary | ICD-10-CM

## 2022-01-25 DIAGNOSIS — H25.11 NUCLEAR SCLEROSIS OF RIGHT EYE: ICD-10-CM

## 2022-01-25 PROCEDURE — 1159F MED LIST DOCD IN RCRD: CPT | Mod: CPTII,S$GLB,, | Performed by: OPHTHALMOLOGY

## 2022-01-25 PROCEDURE — 99999 PR PBB SHADOW E&M-EST. PATIENT-LVL II: CPT | Mod: PBBFAC,,, | Performed by: OPHTHALMOLOGY

## 2022-01-25 PROCEDURE — 1160F PR REVIEW ALL MEDS BY PRESCRIBER/CLIN PHARMACIST DOCUMENTED: ICD-10-PCS | Mod: CPTII,S$GLB,, | Performed by: OPHTHALMOLOGY

## 2022-01-25 PROCEDURE — 3288F FALL RISK ASSESSMENT DOCD: CPT | Mod: CPTII,S$GLB,, | Performed by: OPHTHALMOLOGY

## 2022-01-25 PROCEDURE — 1101F PR PT FALLS ASSESS DOC 0-1 FALLS W/OUT INJ PAST YR: ICD-10-PCS | Mod: CPTII,S$GLB,, | Performed by: OPHTHALMOLOGY

## 2022-01-25 PROCEDURE — 1125F PR PAIN SEVERITY QUANTIFIED, PAIN PRESENT: ICD-10-PCS | Mod: CPTII,S$GLB,, | Performed by: OPHTHALMOLOGY

## 2022-01-25 PROCEDURE — 1125F AMNT PAIN NOTED PAIN PRSNT: CPT | Mod: CPTII,S$GLB,, | Performed by: OPHTHALMOLOGY

## 2022-01-25 PROCEDURE — 99024 PR POST-OP FOLLOW-UP VISIT: ICD-10-PCS | Mod: S$GLB,,, | Performed by: OPHTHALMOLOGY

## 2022-01-25 PROCEDURE — 1159F PR MEDICATION LIST DOCUMENTED IN MEDICAL RECORD: ICD-10-PCS | Mod: CPTII,S$GLB,, | Performed by: OPHTHALMOLOGY

## 2022-01-25 PROCEDURE — 1101F PT FALLS ASSESS-DOCD LE1/YR: CPT | Mod: CPTII,S$GLB,, | Performed by: OPHTHALMOLOGY

## 2022-01-25 PROCEDURE — 1160F RVW MEDS BY RX/DR IN RCRD: CPT | Mod: CPTII,S$GLB,, | Performed by: OPHTHALMOLOGY

## 2022-01-25 PROCEDURE — 3288F PR FALLS RISK ASSESSMENT DOCUMENTED: ICD-10-PCS | Mod: CPTII,S$GLB,, | Performed by: OPHTHALMOLOGY

## 2022-01-25 PROCEDURE — 99999 PR PBB SHADOW E&M-EST. PATIENT-LVL II: ICD-10-PCS | Mod: PBBFAC,,, | Performed by: OPHTHALMOLOGY

## 2022-01-25 PROCEDURE — 99024 POSTOP FOLLOW-UP VISIT: CPT | Mod: S$GLB,,, | Performed by: OPHTHALMOLOGY

## 2022-01-25 NOTE — PROGRESS NOTES
HPI     1 day po Femto/IOL OD with scleral suture fixation.    S/P LASIK    Pt complains of double vision OD since removing patch and he dull pain OD.    PGB TID OD    Last edited by Diane White on 1/25/2022  9:26 AM. (History)            Assessment /Plan     For exam results, see Encounter Report.    Post-operative state    Nuclear sclerosis of right eye      Slit lamp exam:  L/L: nl  K: clear, wound sealed  AC: 1+ cell  Lens: IOL centered and stable    POD1 s/p Phaco/IOL  Appropriate precautions and post op medications reviewed.  Patient instructed to call or come in if symptoms of redness, decreased vision, or pain are experienced.

## 2022-02-01 ENCOUNTER — OFFICE VISIT (OUTPATIENT)
Dept: OPHTHALMOLOGY | Facility: CLINIC | Age: 67
End: 2022-02-01
Attending: OPHTHALMOLOGY
Payer: MEDICARE

## 2022-02-01 DIAGNOSIS — Z98.890 POST-OPERATIVE STATE: Primary | ICD-10-CM

## 2022-02-01 DIAGNOSIS — H25.13 NUCLEAR SCLEROSIS, BILATERAL: ICD-10-CM

## 2022-02-01 PROCEDURE — 99999 PR PBB SHADOW E&M-EST. PATIENT-LVL II: CPT | Mod: PBBFAC,,, | Performed by: OPHTHALMOLOGY

## 2022-02-01 PROCEDURE — 1159F PR MEDICATION LIST DOCUMENTED IN MEDICAL RECORD: ICD-10-PCS | Mod: CPTII,S$GLB,, | Performed by: OPHTHALMOLOGY

## 2022-02-01 PROCEDURE — 3288F PR FALLS RISK ASSESSMENT DOCUMENTED: ICD-10-PCS | Mod: CPTII,S$GLB,, | Performed by: OPHTHALMOLOGY

## 2022-02-01 PROCEDURE — 99024 PR POST-OP FOLLOW-UP VISIT: ICD-10-PCS | Mod: S$GLB,,, | Performed by: OPHTHALMOLOGY

## 2022-02-01 PROCEDURE — 1160F RVW MEDS BY RX/DR IN RCRD: CPT | Mod: CPTII,S$GLB,, | Performed by: OPHTHALMOLOGY

## 2022-02-01 PROCEDURE — 1101F PT FALLS ASSESS-DOCD LE1/YR: CPT | Mod: CPTII,S$GLB,, | Performed by: OPHTHALMOLOGY

## 2022-02-01 PROCEDURE — 3288F FALL RISK ASSESSMENT DOCD: CPT | Mod: CPTII,S$GLB,, | Performed by: OPHTHALMOLOGY

## 2022-02-01 PROCEDURE — 1126F AMNT PAIN NOTED NONE PRSNT: CPT | Mod: CPTII,S$GLB,, | Performed by: OPHTHALMOLOGY

## 2022-02-01 PROCEDURE — 99999 PR PBB SHADOW E&M-EST. PATIENT-LVL II: ICD-10-PCS | Mod: PBBFAC,,, | Performed by: OPHTHALMOLOGY

## 2022-02-01 PROCEDURE — 1160F PR REVIEW ALL MEDS BY PRESCRIBER/CLIN PHARMACIST DOCUMENTED: ICD-10-PCS | Mod: CPTII,S$GLB,, | Performed by: OPHTHALMOLOGY

## 2022-02-01 PROCEDURE — 1126F PR PAIN SEVERITY QUANTIFIED, NO PAIN PRESENT: ICD-10-PCS | Mod: CPTII,S$GLB,, | Performed by: OPHTHALMOLOGY

## 2022-02-01 PROCEDURE — 99024 POSTOP FOLLOW-UP VISIT: CPT | Mod: S$GLB,,, | Performed by: OPHTHALMOLOGY

## 2022-02-01 PROCEDURE — 1101F PR PT FALLS ASSESS DOC 0-1 FALLS W/OUT INJ PAST YR: ICD-10-PCS | Mod: CPTII,S$GLB,, | Performed by: OPHTHALMOLOGY

## 2022-02-01 PROCEDURE — 1159F MED LIST DOCD IN RCRD: CPT | Mod: CPTII,S$GLB,, | Performed by: OPHTHALMOLOGY

## 2022-02-01 NOTE — PROGRESS NOTES
Dr. Alethea Mathias,    Mr. [employer] insurance does not cover the [place] that was prescribed. The insurance will cover Metformin and Metformin ER.  Please advise HPI     S/p Femto/IOL OD with scleral suture fixation. 1/24/22    S/P LASIK    PGB TID OD    Pt here for 1 week post op OD.  Pt states doing well. Pt denies double   vision OD. Pt denies eye pain OD.    Last edited by Tara Carpio MA on 2/1/2022 10:17 AM. (History)              Assessment /Plan     For exam results, see Encounter Report.    Post-operative state    Nuclear sclerosis, bilateral      Slit lamp exam:  L/L: nl  K: clear, wound sealed  AC: trace cell  Iris/Lens: IOL centered and stable    POW1 s/p phaco/CTR /IOL for Marfans subluxation: Surgery healing well with no signs of infection or abnormal inflammation.

## 2022-03-10 ENCOUNTER — OFFICE VISIT (OUTPATIENT)
Dept: OPTOMETRY | Facility: CLINIC | Age: 67
End: 2022-03-10
Payer: MEDICARE

## 2022-03-10 DIAGNOSIS — Z98.41 STATUS POST RIGHT CATARACT EXTRACTION: Primary | ICD-10-CM

## 2022-03-10 PROCEDURE — 1126F PR PAIN SEVERITY QUANTIFIED, NO PAIN PRESENT: ICD-10-PCS | Mod: CPTII,S$GLB,, | Performed by: OPTOMETRIST

## 2022-03-10 PROCEDURE — 1159F MED LIST DOCD IN RCRD: CPT | Mod: CPTII,S$GLB,, | Performed by: OPTOMETRIST

## 2022-03-10 PROCEDURE — 1159F PR MEDICATION LIST DOCUMENTED IN MEDICAL RECORD: ICD-10-PCS | Mod: CPTII,S$GLB,, | Performed by: OPTOMETRIST

## 2022-03-10 PROCEDURE — 1101F PR PT FALLS ASSESS DOC 0-1 FALLS W/OUT INJ PAST YR: ICD-10-PCS | Mod: CPTII,S$GLB,, | Performed by: OPTOMETRIST

## 2022-03-10 PROCEDURE — 3288F PR FALLS RISK ASSESSMENT DOCUMENTED: ICD-10-PCS | Mod: CPTII,S$GLB,, | Performed by: OPTOMETRIST

## 2022-03-10 PROCEDURE — 4010F ACE/ARB THERAPY RXD/TAKEN: CPT | Mod: CPTII,S$GLB,, | Performed by: OPTOMETRIST

## 2022-03-10 PROCEDURE — 99024 PR POST-OP FOLLOW-UP VISIT: ICD-10-PCS | Mod: S$GLB,,, | Performed by: OPTOMETRIST

## 2022-03-10 PROCEDURE — 1101F PT FALLS ASSESS-DOCD LE1/YR: CPT | Mod: CPTII,S$GLB,, | Performed by: OPTOMETRIST

## 2022-03-10 PROCEDURE — 99999 PR PBB SHADOW E&M-EST. PATIENT-LVL III: ICD-10-PCS | Mod: PBBFAC,,, | Performed by: OPTOMETRIST

## 2022-03-10 PROCEDURE — 2023F PR DILATED RETINAL EXAM W/O EVID OF RETINOPATHY: ICD-10-PCS | Mod: CPTII,S$GLB,, | Performed by: OPTOMETRIST

## 2022-03-10 PROCEDURE — 1126F AMNT PAIN NOTED NONE PRSNT: CPT | Mod: CPTII,S$GLB,, | Performed by: OPTOMETRIST

## 2022-03-10 PROCEDURE — 2023F DILAT RTA XM W/O RTNOPTHY: CPT | Mod: CPTII,S$GLB,, | Performed by: OPTOMETRIST

## 2022-03-10 PROCEDURE — 99024 POSTOP FOLLOW-UP VISIT: CPT | Mod: S$GLB,,, | Performed by: OPTOMETRIST

## 2022-03-10 PROCEDURE — 4010F PR ACE/ARB THEARPY RXD/TAKEN: ICD-10-PCS | Mod: CPTII,S$GLB,, | Performed by: OPTOMETRIST

## 2022-03-10 PROCEDURE — 3288F FALL RISK ASSESSMENT DOCD: CPT | Mod: CPTII,S$GLB,, | Performed by: OPTOMETRIST

## 2022-03-10 PROCEDURE — 99999 PR PBB SHADOW E&M-EST. PATIENT-LVL III: CPT | Mod: PBBFAC,,, | Performed by: OPTOMETRIST

## 2022-03-10 NOTE — PROGRESS NOTES
HPI     DATE OF SURGERY: 01/24/2022 OD     IMPLANT: CTR Type 14C  CNA0T0 24.0      DATE OF PROCEDURE:  10/25/2012 OS     IMPLANTS:  1.  SN60WF, 23.0 diopters.  2.  Morcher type 1 L Cionni capsular tension ring.    Patient notes no eye pain. Patient notes vision as stable.       Last edited by Alexander Plascencia on 3/10/2022 10:43 AM. (History)            Assessment /Plan     For exam results, see Encounter Report.    Status post right cataract extraction  -     OCT- Retina            1.  Pt doing well.  No rx given.  Continue with readers as needed.  No CME OD.  Retina flat and intact OU--no holes, tears, breaks, or RDs.  RTC as scheduled.

## 2022-06-29 NOTE — PROGRESS NOTES
"OT Daily Progress Note    Patient:  Mc GAMBLE [facility] #:  [id number]   Date of Note: 3/15/2017  Referring Physician:  Carlene Camp, *  Diagnosis:  R CMC Arthroplasty, volar capsulodesis, EPL stabilization, 1st dorsal compartment release , trapezium excision 9/21/2016  Encounter Diagnoses   Name Primary?    Range of motion deficit     Decreased pinch strength     Stiffness of thumb joint     Pain in thumb joint with movement of right hand     CMC arthritis Yes      NO FOTO/ 20 VISITS   VISIT 10 OF 12     Start Time: 115  End Time: 2  Total Time: 45 min  Group Time: 0      Subjective:   "I was able to turn the knob on my front door, which is hard, but I did it.  I think the  is coming along."   Pain: 2- 3  out of 10     Objective:   Patient seen by OT this session. Treatment  consist of the following: Patient received paraffin bath to R  hand(s) for 10  minutes to increase blood flow, circulation, pain management and for tissue elasticity prior to therex.  Patient received ultrasound to  Thenar MP/IP joint and INDEX MP region to increase blood flow, circulation, tissue elasticity, pain management and for wound/scar management for 8 minutes @ 3 Mhz, Intensity 0.5  w/cm2 at 100%  duty cycle.    Performed MT including mobilization grade I-II  To wrist,  IP/MP of thumb and digits  followed by PROM to increase joint mobility/flexibility followed by therex including  Blocking FDP, FDS, thumb flexion, opposition to ring finger,  reverse blocking PIP, intrinsic +/-, composite fist, finger ab/ad,  circumduction, ab/ad x 10 reps and wrist flex, ext, RD, UD x 10 reps each.  Added 2# wrist flex, ext, RD, UD (gravity eliminated) , sup/pron x 10 reps each for UE strengthening. Performed  red  clothespin for key pinch and 3 pt pinch x 10 reps;    Upgraded to red  digi flex for isolated and composite finger flexion x 10 reps each.  Added 35 lbs./25lbs PHG for gross  x 10 reps each.    Reviewed HEP,  Patient " reported good understanding and use and HEP,modality use. Re-assessed 2/2/16:  RE-assessed 2/23/17.  Assessed /pinch as follows:      5 lbs. (+2)   Key pinch 5.5 psi (+0.5)   3pt pinch 4 psi (+1)  2pt pinch 2 psi (=)      Treatment: Paraffin x 10 min, Ultrasound x 8 min, Therapeutic exercises x 15 min and Manual therapy x 12  min     Assessment:  ROM continues to improve.   and pinch strength remain limited, but slight improvement noted.  Patient compliant with orthotic use, wear and care schedule.  PIP extension lags remain in index/small finger, but continues to improve.     Improved thumb mobility with opposition to ring finger and medial aspect of small finger.  Thumb IP/MP remain stiff and limited, but ROM overall improved.  Improved CMC ROM noted.   Wrist extension remains limited at end range.  Minimal muscle fatigue noted following therex.    Strength appears to be improving per report and tolerance.    Pt will continue to benefit from skilled OT intervention.   Patient is making good progress toward established goals.   Patient continues to demonstrate limitation  with  ROM, Joint mobility, Stiffness, Decreased fine motor coordination, Decreased strength, Continued pain and Scar adhesions.      Goals: (4 weeks)  Cont POC  1) Patient to be IND with HEP, orthotic use, wear and care schedule  and modalities for pain management  2) Increase ROM 3-5 degrees to increase functional hand use for ADLs/work/leisure activities--met   3) Assess  and pinch as able and set goals accordingly --met     Updated goals:   1)  Increase ROM 3-5 degrees to increase functional hand use for ADLs/work/leisure activities  2)  Increase pinch 1-3 psi's for taking care of chicken and household chores  3)  Increase  2-6 lbs. For grasping tools and performing home repairs     Plan:  Continue 1-2x week x 6-8 weeks  during the certification period from   2/23/2017 to 4/23/2017   in pursuit of  OT goals.           no fever and no chills.

## 2023-05-31 ENCOUNTER — PATIENT MESSAGE (OUTPATIENT)
Dept: RESEARCH | Facility: HOSPITAL | Age: 68
End: 2023-05-31
Payer: MEDICARE

## 2023-08-29 ENCOUNTER — OFFICE VISIT (OUTPATIENT)
Dept: OPHTHALMOLOGY | Facility: CLINIC | Age: 68
End: 2023-08-29
Attending: OPHTHALMOLOGY
Payer: MEDICARE

## 2023-08-29 DIAGNOSIS — Z98.890 S/P LASIK (LASER ASSISTED IN SITU KERATOMILEUSIS): ICD-10-CM

## 2023-08-29 DIAGNOSIS — Q87.40 MARFAN SYNDROME: Primary | ICD-10-CM

## 2023-08-29 DIAGNOSIS — Q12.1 ECTOPIA LENTIS: ICD-10-CM

## 2023-08-29 PROCEDURE — 1101F PT FALLS ASSESS-DOCD LE1/YR: CPT | Mod: CPTII,S$GLB,, | Performed by: OPHTHALMOLOGY

## 2023-08-29 PROCEDURE — 99999 PR PBB SHADOW E&M-EST. PATIENT-LVL IV: CPT | Mod: PBBFAC,,, | Performed by: OPHTHALMOLOGY

## 2023-08-29 PROCEDURE — 4010F PR ACE/ARB THEARPY RXD/TAKEN: ICD-10-PCS | Mod: CPTII,S$GLB,, | Performed by: OPHTHALMOLOGY

## 2023-08-29 PROCEDURE — 1126F AMNT PAIN NOTED NONE PRSNT: CPT | Mod: CPTII,S$GLB,, | Performed by: OPHTHALMOLOGY

## 2023-08-29 PROCEDURE — 1101F PR PT FALLS ASSESS DOC 0-1 FALLS W/OUT INJ PAST YR: ICD-10-PCS | Mod: CPTII,S$GLB,, | Performed by: OPHTHALMOLOGY

## 2023-08-29 PROCEDURE — 1159F MED LIST DOCD IN RCRD: CPT | Mod: CPTII,S$GLB,, | Performed by: OPHTHALMOLOGY

## 2023-08-29 PROCEDURE — 3288F FALL RISK ASSESSMENT DOCD: CPT | Mod: CPTII,S$GLB,, | Performed by: OPHTHALMOLOGY

## 2023-08-29 PROCEDURE — 92014 PR EYE EXAM, EST PATIENT,COMPREHESV: ICD-10-PCS | Mod: S$GLB,,, | Performed by: OPHTHALMOLOGY

## 2023-08-29 PROCEDURE — 1126F PR PAIN SEVERITY QUANTIFIED, NO PAIN PRESENT: ICD-10-PCS | Mod: CPTII,S$GLB,, | Performed by: OPHTHALMOLOGY

## 2023-08-29 PROCEDURE — 1159F PR MEDICATION LIST DOCUMENTED IN MEDICAL RECORD: ICD-10-PCS | Mod: CPTII,S$GLB,, | Performed by: OPHTHALMOLOGY

## 2023-08-29 PROCEDURE — 4010F ACE/ARB THERAPY RXD/TAKEN: CPT | Mod: CPTII,S$GLB,, | Performed by: OPHTHALMOLOGY

## 2023-08-29 PROCEDURE — 1160F PR REVIEW ALL MEDS BY PRESCRIBER/CLIN PHARMACIST DOCUMENTED: ICD-10-PCS | Mod: CPTII,S$GLB,, | Performed by: OPHTHALMOLOGY

## 2023-08-29 PROCEDURE — 99999 PR PBB SHADOW E&M-EST. PATIENT-LVL IV: ICD-10-PCS | Mod: PBBFAC,,, | Performed by: OPHTHALMOLOGY

## 2023-08-29 PROCEDURE — 3288F PR FALLS RISK ASSESSMENT DOCUMENTED: ICD-10-PCS | Mod: CPTII,S$GLB,, | Performed by: OPHTHALMOLOGY

## 2023-08-29 PROCEDURE — 92014 COMPRE OPH EXAM EST PT 1/>: CPT | Mod: S$GLB,,, | Performed by: OPHTHALMOLOGY

## 2023-08-29 PROCEDURE — 1160F RVW MEDS BY RX/DR IN RCRD: CPT | Mod: CPTII,S$GLB,, | Performed by: OPHTHALMOLOGY

## 2023-08-29 RX ORDER — CYCLOP/TROP/PROPA/PHEN/KET/WAT 1-1-0.1%
1 DROPS (EA) OPHTHALMIC (EYE)
Status: CANCELLED | OUTPATIENT
Start: 2023-08-29

## 2023-08-29 RX ORDER — BETAMETHASONE DIPROPIONATE 0.5 MG/G
CREAM TOPICAL 2 TIMES DAILY
COMMUNITY
Start: 2023-06-23

## 2023-08-29 RX ORDER — PHENYLEPHRINE HYDROCHLORIDE 100 MG/ML
1 SOLUTION/ DROPS OPHTHALMIC
Status: CANCELLED | OUTPATIENT
Start: 2023-08-29

## 2023-08-29 RX ORDER — DULAGLUTIDE 1.5 MG/.5ML
INJECTION, SOLUTION SUBCUTANEOUS
COMMUNITY
Start: 2023-07-17

## 2023-08-29 RX ORDER — PIOGLITAZONE HCL AND METFORMIN HCL 500; 15 MG/1; MG/1
TABLET ORAL
Status: ON HOLD | COMMUNITY
End: 2023-10-30 | Stop reason: ALTCHOICE

## 2023-08-29 RX ORDER — METOPROLOL TARTRATE 100 MG/1
100 TABLET ORAL
Status: ON HOLD | COMMUNITY
End: 2023-10-30 | Stop reason: ALTCHOICE

## 2023-08-29 RX ORDER — MELOXICAM 7.5 MG/1
7.5 TABLET ORAL
COMMUNITY

## 2023-08-29 RX ORDER — MOXIFLOXACIN 5 MG/ML
1 SOLUTION/ DROPS OPHTHALMIC
Status: CANCELLED | OUTPATIENT
Start: 2023-08-29

## 2023-08-29 NOTE — PROGRESS NOTES
HPI     Cataract            Comments: Subluxed IOL OS          Comments    Last eye exam was 3/10/22 with Dr. Young.  Patient states OS vision has become more blurry since Tuesday. Aware that   the lens is moving more from left to right. No vision changes or   complaints OD.     01/24/2022 CTR Type 14C / CNA0T0 24.0 OD  10/25/2012 CTR Type 1L / SN60WF 23.0 OS    S/p Cryo OS 3/19/12   S/p PPV/Cryo/FGx/C3F8 OS 3/23/12   S/p laser touch up OS 3/27/12, 4/9/12, 4/16/12, 5/3/12   S/p Lasik OU 1996           Last edited by Lupis Lara MA on 8/29/2023  1:08 PM.            Assessment /Plan     For exam results, see Encounter Report.    Marfan syndrome    S/P LASIK (laser assisted in situ keratomileusis)    Ectopia lentis      Hx of Phaco with 1L CTR (inferior fixation) OS 2012  Now with IOL instability OS, donesis with blink    Plan:  IOL fixation OS  with Canabrava Technique 2 point (vs 3 pt)  RBB, Micro instruments, 6-0 prolene, 27G or Yamane 30G needles, low temp cautery    COMPLEX case 45min

## 2023-09-14 ENCOUNTER — TELEPHONE (OUTPATIENT)
Dept: OPHTHALMOLOGY | Facility: CLINIC | Age: 68
End: 2023-09-14
Payer: MEDICARE

## 2023-09-14 NOTE — TELEPHONE ENCOUNTER
----- Message from Anish Friedman sent at 9/14/2023 12:28 PM CDT -----  Contact: [phone]  Pt is calling back to reschedule his SX. Please call back to further assist.

## 2023-10-03 ENCOUNTER — TELEPHONE (OUTPATIENT)
Dept: OPHTHALMOLOGY | Facility: CLINIC | Age: 68
End: 2023-10-03
Payer: MEDICARE

## 2023-10-03 DIAGNOSIS — Q12.1 ECTOPIA LENTIS: Primary | ICD-10-CM

## 2023-10-03 DIAGNOSIS — Q87.40 MARFAN SYNDROME: ICD-10-CM

## 2023-10-25 ENCOUNTER — TELEPHONE (OUTPATIENT)
Dept: OPHTHALMOLOGY | Facility: CLINIC | Age: 68
End: 2023-10-25
Payer: MEDICARE

## 2023-10-25 ENCOUNTER — PATIENT MESSAGE (OUTPATIENT)
Dept: SURGERY | Facility: HOSPITAL | Age: 68
End: 2023-10-25
Payer: MEDICARE

## 2023-10-25 NOTE — TELEPHONE ENCOUNTER
Patient given arrival time of 12:30 am on Monday October 30 . Nothing to eat or drink after 11:59 pm.  Start drops into the operative eye today. [id number] [facility]

## 2023-10-27 NOTE — PRE-PROCEDURE INSTRUCTIONS
The following was discussed with pt via phone and sent to pt portal. Pt verbalized understanding.    - Nothing to eat or drink after midnight the night before your surgery, except AM meds with small sips of water     - HOLD all Diabetic meds AM of surgery  - HOLD all Insulin AM of surgery  - HOLD all Fluid pills AM of surgery  - HOLD all non-insulin shots until after surgery (Ozempic, Mounjaro, Trulicity, Victoza, Byetta, Wegovy and Adlyxin) (up to 7 days prior)  - HOLD all vits and herbal meds AM of surgery  - HOLD blood thinner meds AM of surgery        - TAKE all B/P meds, EXCEPT those that contain a fluid pill  - USE inhalers as needed and bring AM of surgery  - USE eye drops as directed     - Shower and wash face with dial soap for 3 mins PM prior and AM of surgery  - No powder, lotions, creams, oils, gels, ointments, makeup,  or jewelry    - Wear comfortable clothing (button up shirt)     (Patients ride may not leave while patient is in surgery)     -- 2nd floor surgery center, [facility], located @ [address] [place] [address]    arrival time verified

## 2023-10-30 ENCOUNTER — HOSPITAL ENCOUNTER (OUTPATIENT)
Facility: HOSPITAL | Age: 68
Discharge: HOME | End: 2023-10-30
Attending: OPHTHALMOLOGY | Admitting: OPHTHALMOLOGY
Payer: MEDICARE

## 2023-10-30 ENCOUNTER — ANESTHESIA EVENT (OUTPATIENT)
Dept: SURGERY | Facility: HOSPITAL | Age: 68
End: 2023-10-30
Payer: MEDICARE

## 2023-10-30 ENCOUNTER — ANESTHESIA (OUTPATIENT)
Dept: SURGERY | Facility: HOSPITAL | Age: 68
End: 2023-10-30
Payer: MEDICARE

## 2023-10-30 VITALS
HEART RATE: 62 BPM | OXYGEN SATURATION: 96 % | RESPIRATION RATE: 17 BRPM | SYSTOLIC BLOOD PRESSURE: 155 MMHG | DIASTOLIC BLOOD PRESSURE: 85 MMHG | TEMPERATURE: 99 F

## 2023-10-30 DIAGNOSIS — Q12.1 ECTOPIA LENTIS: ICD-10-CM

## 2023-10-30 DIAGNOSIS — Q87.40 MARFAN SYNDROME: ICD-10-CM

## 2023-10-30 DIAGNOSIS — Z98.890 S/P LASIK (LASER ASSISTED IN SITU KERATOMILEUSIS): ICD-10-CM

## 2023-10-30 DIAGNOSIS — T85.22XD DISLOCATION OF INTRAOCULAR LENS, SUBSEQUENT ENCOUNTER: Primary | ICD-10-CM

## 2023-10-30 PROBLEM — T85.22XA DISLOCATED INTRAOCULAR LENS: Status: ACTIVE | Noted: 2023-10-30

## 2023-10-30 LAB — POCT GLUCOSE: 160 MG/DL (ref 70–110)

## 2023-10-30 PROCEDURE — 66825 REPOSITION INTRAOCULAR LENS: CPT | Mod: LT,,, | Performed by: OPHTHALMOLOGY

## 2023-10-30 PROCEDURE — 37000009 HC ANESTHESIA EA ADD 15 MINS: Performed by: OPHTHALMOLOGY

## 2023-10-30 PROCEDURE — 27201423 OPTIME MED/SURG SUP & DEVICES STERILE SUPPLY: Performed by: OPHTHALMOLOGY

## 2023-10-30 PROCEDURE — 36000707: Performed by: OPHTHALMOLOGY

## 2023-10-30 PROCEDURE — 25000003 PHARM REV CODE 250: Performed by: OPHTHALMOLOGY

## 2023-10-30 PROCEDURE — 66825 PR REPOSITION INTRAOCULAR LENS W INCIS: ICD-10-PCS | Mod: LT,,, | Performed by: OPHTHALMOLOGY

## 2023-10-30 PROCEDURE — D9220A PRA ANESTHESIA: Mod: CRNA,,, | Performed by: STUDENT IN AN ORGANIZED HEALTH CARE EDUCATION/TRAINING PROGRAM

## 2023-10-30 PROCEDURE — 63600175 PHARM REV CODE 636 W HCPCS: Performed by: OPHTHALMOLOGY

## 2023-10-30 PROCEDURE — 37000008 HC ANESTHESIA 1ST 15 MINUTES: Performed by: OPHTHALMOLOGY

## 2023-10-30 PROCEDURE — 94761 N-INVAS EAR/PLS OXIMETRY MLT: CPT

## 2023-10-30 PROCEDURE — D9220A PRA ANESTHESIA: ICD-10-PCS | Mod: ANES,,, | Performed by: ANESTHESIOLOGY

## 2023-10-30 PROCEDURE — 36000706: Performed by: OPHTHALMOLOGY

## 2023-10-30 PROCEDURE — 99900035 HC TECH TIME PER 15 MIN (STAT)

## 2023-10-30 PROCEDURE — 71000015 HC POSTOP RECOV 1ST HR: Performed by: OPHTHALMOLOGY

## 2023-10-30 PROCEDURE — D9220A PRA ANESTHESIA: ICD-10-PCS | Mod: CRNA,,, | Performed by: STUDENT IN AN ORGANIZED HEALTH CARE EDUCATION/TRAINING PROGRAM

## 2023-10-30 PROCEDURE — 63600175 PHARM REV CODE 636 W HCPCS: Performed by: STUDENT IN AN ORGANIZED HEALTH CARE EDUCATION/TRAINING PROGRAM

## 2023-10-30 PROCEDURE — D9220A PRA ANESTHESIA: Mod: ANES,,, | Performed by: ANESTHESIOLOGY

## 2023-10-30 PROCEDURE — 82962 GLUCOSE BLOOD TEST: CPT | Performed by: OPHTHALMOLOGY

## 2023-10-30 RX ORDER — SODIUM CHLORIDE 0.9 % (FLUSH) 0.9 %
2 SYRINGE (ML) INJECTION
Status: DISCONTINUED | OUTPATIENT
Start: 2023-10-30 | End: 2023-10-30 | Stop reason: HOSPADM

## 2023-10-30 RX ORDER — CYCLOP/TROP/PROPA/PHEN/KET/WAT 1-1-0.1%
1 DROPS (EA) OPHTHALMIC (EYE)
Status: COMPLETED | OUTPATIENT
Start: 2023-10-30 | End: 2023-10-30

## 2023-10-30 RX ORDER — MOXIFLOXACIN 5 MG/ML
1 SOLUTION/ DROPS OPHTHALMIC
Status: COMPLETED | OUTPATIENT
Start: 2023-10-30 | End: 2023-10-30

## 2023-10-30 RX ORDER — ACETAMINOPHEN 325 MG/1
650 TABLET ORAL EVERY 4 HOURS PRN
Status: DISCONTINUED | OUTPATIENT
Start: 2023-10-30 | End: 2023-10-30 | Stop reason: HOSPADM

## 2023-10-30 RX ORDER — FENTANYL CITRATE 50 UG/ML
INJECTION, SOLUTION INTRAMUSCULAR; INTRAVENOUS
Status: DISCONTINUED | OUTPATIENT
Start: 2023-10-30 | End: 2023-10-30

## 2023-10-30 RX ORDER — LIDOCAINE HYDROCHLORIDE 20 MG/ML
INJECTION, SOLUTION INFILTRATION; PERINEURAL
Status: DISCONTINUED | OUTPATIENT
Start: 2023-10-30 | End: 2023-10-30 | Stop reason: HOSPADM

## 2023-10-30 RX ORDER — DEXAMETHASONE SODIUM PHOSPHATE 4 MG/ML
INJECTION, SOLUTION INTRA-ARTICULAR; INTRALESIONAL; INTRAMUSCULAR; INTRAVENOUS; SOFT TISSUE
Status: DISCONTINUED | OUTPATIENT
Start: 2023-10-30 | End: 2023-10-30 | Stop reason: HOSPADM

## 2023-10-30 RX ORDER — PHENYLEPHRINE HYDROCHLORIDE 100 MG/ML
1 SOLUTION/ DROPS OPHTHALMIC
Status: DISCONTINUED | OUTPATIENT
Start: 2023-10-30 | End: 2023-10-30 | Stop reason: HOSPADM

## 2023-10-30 RX ORDER — MOXIFLOXACIN 5 MG/ML
1 SOLUTION/ DROPS OPHTHALMIC
Status: DISCONTINUED | OUTPATIENT
Start: 2023-10-30 | End: 2023-10-30

## 2023-10-30 RX ORDER — BUPIVACAINE HYDROCHLORIDE 7.5 MG/ML
INJECTION, SOLUTION EPIDURAL; RETROBULBAR
Status: DISCONTINUED | OUTPATIENT
Start: 2023-10-30 | End: 2023-10-30 | Stop reason: HOSPADM

## 2023-10-30 RX ORDER — HYDROCODONE BITARTRATE AND ACETAMINOPHEN 5; 325 MG/1; MG/1
1 TABLET ORAL EVERY 4 HOURS PRN
Status: DISCONTINUED | OUTPATIENT
Start: 2023-10-30 | End: 2023-10-30 | Stop reason: HOSPADM

## 2023-10-30 RX ORDER — MIDAZOLAM HYDROCHLORIDE 1 MG/ML
INJECTION INTRAMUSCULAR; INTRAVENOUS
Status: DISCONTINUED | OUTPATIENT
Start: 2023-10-30 | End: 2023-10-30

## 2023-10-30 RX ADMIN — MOXIFLOXACIN OPHTHALMIC 1 DROP: 5 SOLUTION/ DROPS OPHTHALMIC at 01:10

## 2023-10-30 RX ADMIN — Medication 1 DROP: at 12:10

## 2023-10-30 RX ADMIN — MOXIFLOXACIN OPHTHALMIC 1 DROP: 5 SOLUTION/ DROPS OPHTHALMIC at 12:10

## 2023-10-30 RX ADMIN — MIDAZOLAM HYDROCHLORIDE 1 MG: 1 INJECTION INTRAMUSCULAR; INTRAVENOUS at 02:10

## 2023-10-30 RX ADMIN — Medication 1 DROP: at 01:10

## 2023-10-30 RX ADMIN — FENTANYL CITRATE 25 MCG: 50 INJECTION, SOLUTION INTRAMUSCULAR; INTRAVENOUS at 02:10

## 2023-10-30 RX ADMIN — MIDAZOLAM HYDROCHLORIDE 2 MG: 1 INJECTION INTRAMUSCULAR; INTRAVENOUS at 02:10

## 2023-10-30 RX ADMIN — FENTANYL CITRATE 50 MCG: 50 INJECTION, SOLUTION INTRAMUSCULAR; INTRAVENOUS at 02:10

## 2023-10-30 NOTE — ANESTHESIA POSTPROCEDURE EVALUATION
Anesthesia Post Evaluation    Patient: Mc Jeter    Procedure(s) Performed: Procedure(s) (LRB):  REPOSITIONING, IOL (Left)    Final Anesthesia Type: MAC      Patient location during evaluation: PACU  Patient participation: Yes- Able to Participate  Level of consciousness: awake and alert  Post-procedure vital signs: reviewed and stable  Pain management: adequate  Airway patency: patent    PONV status at discharge: No PONV  Anesthetic complications: no      Cardiovascular status: stable  Respiratory status: spontaneous ventilation  Hydration status: euvolemic  Follow-up not needed.          Vitals Value Taken Time   /85 10/30/23 1547   Temp 36 10/30/23 1555   Pulse 66 10/30/23 1553   Resp 17 10/30/23 1545   SpO2 96 % 10/30/23 1553   Vitals shown include unvalidated device data.      No case tracking events are documented in the log.      Pain/Armin Score: Armin Score: 10 (10/30/2023  3:45 PM)

## 2023-10-30 NOTE — H&P
CC: Painless, progressive loss of vision  Present Illness: SUBLUXED IOL  Allergies/Current Meds: see meds  Mental Status: A&O x3  Pertinent Medical History: n/a     Physical Exam  General: NAD  HEENT: Eye white/quiet  Lungs: Adequate respirations  Heart: + pulses  Abdomen: soft  Rectal/GI/: deferred     Impression: SUBLUXED IOL  Plan:  SCLERAL FIXATION OF IOL

## 2023-10-30 NOTE — BRIEF OP NOTE
Outcome: Successful outpatient ophthalmic surgical procedure  Preprinted Instructions given to patient.  Regular diet.  Activity: No restrictions  Meds: see Med Rec  Condition: stable  Follow up: 1 day with Dr Gallardo  Disposition: Home  Diagnosis: s/p eye surgery  Date of discharge: 10/30/2023

## 2023-10-30 NOTE — ANESTHESIA PREPROCEDURE EVALUATION
10/30/2023  Mc Jeter is a 68 y.o., male.    Pre-op Assessment    I have reviewed the Patient Summary Reports.    I have reviewed the Nursing Notes. I have reviewed the NPO Status.   I have reviewed the Medications.     Review of Systems  Anesthesia Hx:  PONV History of prior surgery of interest to airway management or planning: Denies Family Hx of Anesthesia complications.  Personal Hx of Anesthesia complications, Post-Operative Nausea/Vomiting, in the past, but not with recent anesthetics / prophylaxis Slow To Awaken/Delayed Emergence and moderate, did not delay extubation, but prolonged PACU stay   Social:  Non-Smoker    Hematology/Oncology:  Hematology Normal   Oncology Normal     EENT/Dental:EENT/Dental Normal   Cardiovascular:   Hypertension Dysrhythmias atrial fibrillation Marfans Syndrome   Pulmonary:   Sleep Apnea    Renal/:   Chronic Renal Disease    Hepatic/GI:   Hepatitis    Musculoskeletal:  Musculoskeletal Normal    Neurological:  Neurology Normal    Endocrine:   Diabetes, type 2    Dermatological:  Skin Normal        Physical Exam  General:  Well nourished      Airway/Jaw/Neck:  Airway Findings: Mouth Opening: Normal   Tongue: Normal   General Airway Assessment: Adult Mallampati: II      Dental:  Dental Findings: In tact          Mental Status:  Mental Status Findings:  Cooperative, Alert and Oriented         Anesthesia Plan  Type of Anesthesia, risks & benefits discussed:  Anesthesia Type:  MAC    Patient's Preference:   Plan Factors:          Intra-op Monitoring Plan: standard ASA monitors  Intra-op Monitoring Plan Comments:   Post Op Pain Control Plan: per primary service following discharge from PACU  Post Op Pain Control Plan Comments:     Induction:    Beta Blocker:         Informed Consent: Informed consent signed with the Patient and all parties understand the risks and agree with  anesthesia plan.  All questions answered.    ASA Score: 3     Day of Surgery Review of History & Physical:    H&P Update referred to the surgeon/provider.          Ready For Surgery From Anesthesia Perspective.           Physical Exam  General: Well nourished    Airway:  Mallampati: II   Mouth Opening: Normal  Tongue: Normal    Dental:  In tact          Anesthesia Plan  Type of Anesthesia, risks & benefits discussed:    Anesthesia Type: MAC  Intra-op Monitoring Plan: standard ASA monitors  Post Op Pain Control Plan: per primary service following discharge from PACU  Informed Consent: Informed consent signed with the Patient and all parties understand the risks and agree with anesthesia plan.  All questions answered.   ASA Score: 3  Day of Surgery Review of History & Physical: H&P Update referred to the surgeon/provider.    Ready For Surgery From Anesthesia Perspective.       .

## 2023-10-30 NOTE — PLAN OF CARE
Discharge instructions given and explained to patient and family (wife Roseanna) with verbalization of understanding all instructions. Prescription given and explained next time and doses of each medication. Patients v/s stable, denies n/v and tolerating po, rates pain level tolerable, IV removed, and family at bedside for patient discharge home.

## 2023-10-30 NOTE — OP NOTE
SURGEON:  Amaya Gallardo M.D.    PREOPERATIVE DIAGNOSIS:    Dislocated IOL left Eye    POSTOPERATIVE DIAGNOSIS:    Dislocated IOL left Eye    PROCEDURES:    1) IOL repositioning and scleral suture fixation OS    DATE OF SURGERY: 10/30/23      ANESTHESIA:  MAC with Retrobulbar block    COMPLICATIONS:  None    ESTIMATED BLOOD LOSS: None    SPECIMENS: None    INDICATIONS:    The patient has a history of painless progressive visual loss and  difficulty with activities of daily living secondary to dislocation of the IOL.  After a thorough discussion of the risks, benefits, and alternatives to surgery, including, but not limited to, the rare risks of infection, retinal detachment, hemorrhage, need for additional surgery, loss of vision, and even loss of the eye, the patient voices understanding and desires to proceed.    DESCRIPTION OF PROCEDURE:       After verification and marking of the proper eye in the preop holding area, the patient was brought to the operating room in supine position where the eye was prepped and draped in standard sterile fashion with 5% Betadine and a lid speculum placed in the eye.   Retrobulbar block was used in addition to the preoperative anesthesia and the procedure was begun by the creation of 2 paracentesis incisions 180 degrees apart, through which viscoelastic was used to fill the anterior chamber.  Next, a keratome blade was used to create a triplanar temporal clear corneal incision. 2 limbal peritomies were created and a christine at 3mm posterior to limbus was made 180 degrees apart. Two 25G needles were used to enter the sclera ab externo and dock both arms of the double armed 9-0 prolene suture. One needle was passed below (through the bag) and one above the IOL haptic at two places, 180 degrees apart. The sutures were melted without tension to bring the IOL centered in the eye.  All remaining viscoelastics were removed from the eye and at the end of the case the pupil was round, the lens  was well-centered and stable  with scleral fixation and all wounds were found to be water tight.  Drops of Vigamox and Pred Forte were instilled and a shield was placed over the eye. The patient will follow up with Dr. Gallardo in the morning.

## 2023-10-30 NOTE — TRANSFER OF CARE
Anesthesia Transfer of Care Note    Patient: Mc Jeter    Procedure(s) Performed: Procedure(s) (LRB):  REPOSITIONING, IOL (Left)    Patient location: [facility]    Anesthesia Type: MAC    Transport from OR: Transported from OR on room air with adequate spontaneous ventilation    Post pain: adequate analgesia    Post assessment: no apparent anesthetic complications and tolerated procedure well    Post vital signs: stable    Level of consciousness: awake, alert and oriented    Nausea/Vomiting: no nausea/vomiting    Complications: none    Transfer of care protocol was followed      Last vitals:   Visit Vitals  BP (!) 160/86 (BP Location: Left arm, Patient Position: Lying)   Pulse 62   Temp 37 °C (98.6 °F) (Temporal)   Resp 18   SpO2 96%

## 2023-10-30 NOTE — DISCHARGE INSTRUCTIONS
CATARACT SURGERY    POST-OPERATIVE INSTRUCTIONS    · Apply drops THREE times a day into operative eye for 30 days.    · DO NOT rub your eye    · Wear protective sunglasses during the day    · Resume moderate activity    · Bathe/shower/wash face normally    · DO NOT apply makeup around the operative eye for 1 week.         You should expect    - Blurry vision and halos for 24-48 hours    - Dilated pupil for 24-48 hours    - Scratchy feeling in the eye for 1-2 days    - Curved shadow in your peripheral vision for 2-3 weeks    - Occasional flickering of lights for up to 1 week    -If you experience severe pain or nausea, please call Dr Gallardo or the on-call doctor at [phone]    - Plan to see Dr Gallardo tomorrow .      [facility]    [address] [facility] [address]    ** Most patients can drive the next day, but if you do not feel comfortable driving, please arrange for transportation.

## 2023-10-31 ENCOUNTER — OFFICE VISIT (OUTPATIENT)
Dept: OPHTHALMOLOGY | Facility: CLINIC | Age: 68
End: 2023-10-31
Payer: MEDICARE

## 2023-10-31 DIAGNOSIS — T85.22XA DISLOCATED IOL (INTRAOCULAR LENS), POSTERIOR, LEFT: ICD-10-CM

## 2023-10-31 DIAGNOSIS — Z98.890 POST-OPERATIVE STATE: Primary | ICD-10-CM

## 2023-10-31 PROCEDURE — 99024 PR POST-OP FOLLOW-UP VISIT: ICD-10-PCS | Mod: S$GLB,,, | Performed by: OPHTHALMOLOGY

## 2023-10-31 PROCEDURE — 3044F HG A1C LEVEL LT 7.0%: CPT | Mod: CPTII,S$GLB,, | Performed by: OPHTHALMOLOGY

## 2023-10-31 PROCEDURE — 1101F PT FALLS ASSESS-DOCD LE1/YR: CPT | Mod: CPTII,S$GLB,, | Performed by: OPHTHALMOLOGY

## 2023-10-31 PROCEDURE — 1159F MED LIST DOCD IN RCRD: CPT | Mod: CPTII,S$GLB,, | Performed by: OPHTHALMOLOGY

## 2023-10-31 PROCEDURE — 1160F PR REVIEW ALL MEDS BY PRESCRIBER/CLIN PHARMACIST DOCUMENTED: ICD-10-PCS | Mod: CPTII,S$GLB,, | Performed by: OPHTHALMOLOGY

## 2023-10-31 PROCEDURE — 1126F PR PAIN SEVERITY QUANTIFIED, NO PAIN PRESENT: ICD-10-PCS | Mod: CPTII,S$GLB,, | Performed by: OPHTHALMOLOGY

## 2023-10-31 PROCEDURE — 1126F AMNT PAIN NOTED NONE PRSNT: CPT | Mod: CPTII,S$GLB,, | Performed by: OPHTHALMOLOGY

## 2023-10-31 PROCEDURE — 1101F PR PT FALLS ASSESS DOC 0-1 FALLS W/OUT INJ PAST YR: ICD-10-PCS | Mod: CPTII,S$GLB,, | Performed by: OPHTHALMOLOGY

## 2023-10-31 PROCEDURE — 1159F PR MEDICATION LIST DOCUMENTED IN MEDICAL RECORD: ICD-10-PCS | Mod: CPTII,S$GLB,, | Performed by: OPHTHALMOLOGY

## 2023-10-31 PROCEDURE — 1160F RVW MEDS BY RX/DR IN RCRD: CPT | Mod: CPTII,S$GLB,, | Performed by: OPHTHALMOLOGY

## 2023-10-31 PROCEDURE — 99024 POSTOP FOLLOW-UP VISIT: CPT | Mod: S$GLB,,, | Performed by: OPHTHALMOLOGY

## 2023-10-31 PROCEDURE — 3288F FALL RISK ASSESSMENT DOCD: CPT | Mod: CPTII,S$GLB,, | Performed by: OPHTHALMOLOGY

## 2023-10-31 PROCEDURE — 3044F PR MOST RECENT HEMOGLOBIN A1C LEVEL <7.0%: ICD-10-PCS | Mod: CPTII,S$GLB,, | Performed by: OPHTHALMOLOGY

## 2023-10-31 PROCEDURE — 3288F PR FALLS RISK ASSESSMENT DOCUMENTED: ICD-10-PCS | Mod: CPTII,S$GLB,, | Performed by: OPHTHALMOLOGY

## 2023-10-31 PROCEDURE — 4010F PR ACE/ARB THEARPY RXD/TAKEN: ICD-10-PCS | Mod: CPTII,S$GLB,, | Performed by: OPHTHALMOLOGY

## 2023-10-31 PROCEDURE — 4010F ACE/ARB THERAPY RXD/TAKEN: CPT | Mod: CPTII,S$GLB,, | Performed by: OPHTHALMOLOGY

## 2023-10-31 PROCEDURE — 99999 PR PBB SHADOW E&M-EST. PATIENT-LVL III: CPT | Mod: PBBFAC,,, | Performed by: OPHTHALMOLOGY

## 2023-10-31 PROCEDURE — 99999 PR PBB SHADOW E&M-EST. PATIENT-LVL III: ICD-10-PCS | Mod: PBBFAC,,, | Performed by: OPHTHALMOLOGY

## 2023-10-31 RX ORDER — PANTOPRAZOLE SODIUM 40 MG/1
40 TABLET, DELAYED RELEASE ORAL EVERY MORNING
COMMUNITY
Start: 2023-10-20

## 2023-10-31 RX ORDER — TERBINAFINE HYDROCHLORIDE 250 MG/1
TABLET ORAL
COMMUNITY
Start: 2023-10-16

## 2023-10-31 RX ORDER — ATORVASTATIN CALCIUM 40 MG/1
20 TABLET, FILM COATED ORAL DAILY
COMMUNITY
Start: 2023-09-20 | End: 2024-01-23 | Stop reason: DRUGHIGH

## 2023-10-31 RX ORDER — PREDNISOLONE ACETATE 10 MG/ML
1 SUSPENSION/ DROPS OPHTHALMIC 3 TIMES DAILY
COMMUNITY
Start: 2023-10-25

## 2023-10-31 RX ORDER — OFLOXACIN 3 MG/ML
SOLUTION/ DROPS OPHTHALMIC
Status: ON HOLD | COMMUNITY
Start: 2023-10-25 | End: 2023-12-11 | Stop reason: HOSPADM

## 2023-10-31 RX ORDER — TRIAMCINOLONE ACETONIDE 1 MG/G
CREAM TOPICAL 2 TIMES DAILY
COMMUNITY
Start: 2023-09-15

## 2023-10-31 NOTE — PROGRESS NOTES
HPI     Post-op Evaluation            Comments: 1 day IOL repositioning OS          Comments    Patient here for 1 day phaco repositioning OS    Patient states OS doing well since surgery. No pain but slightly scratchy.    Ofloxacin TID OS  PF TID OS     01/24/2022 IMPLANT: CTR Type 14C/ CNA0T0 24.0 OD  10/30/2023 IOL repositioning and scleral suture fixation OS               Last edited by Lupis Lara MA on 10/31/2023  9:21 AM.            Assessment /Plan     For exam results, see Encounter Report.    Post-operative state    Dislocated IOL (intraocular lens), posterior, left      POD1 Canabrava IOL/Bag/CTR fixation to sclera OS  IOL nasally decentered but stable with good vision POD1  MOnitor for now  May consider a 3rd Canabrava suture fixation through eyelet of CTR for temporal fixation.

## 2023-11-07 ENCOUNTER — OFFICE VISIT (OUTPATIENT)
Dept: OPHTHALMOLOGY | Facility: CLINIC | Age: 68
End: 2023-11-07
Payer: MEDICARE

## 2023-11-07 DIAGNOSIS — T85.22XA DISLOCATED IOL (INTRAOCULAR LENS), POSTERIOR, LEFT: Primary | ICD-10-CM

## 2023-11-07 DIAGNOSIS — Z98.890 POST-OPERATIVE STATE: ICD-10-CM

## 2023-11-07 PROCEDURE — 1160F PR REVIEW ALL MEDS BY PRESCRIBER/CLIN PHARMACIST DOCUMENTED: ICD-10-PCS | Mod: CPTII,S$GLB,, | Performed by: OPHTHALMOLOGY

## 2023-11-07 PROCEDURE — 99999 PR PBB SHADOW E&M-EST. PATIENT-LVL III: CPT | Mod: PBBFAC,,, | Performed by: OPHTHALMOLOGY

## 2023-11-07 PROCEDURE — 3288F PR FALLS RISK ASSESSMENT DOCUMENTED: ICD-10-PCS | Mod: CPTII,S$GLB,, | Performed by: OPHTHALMOLOGY

## 2023-11-07 PROCEDURE — 99024 POSTOP FOLLOW-UP VISIT: CPT | Mod: S$GLB,,, | Performed by: OPHTHALMOLOGY

## 2023-11-07 PROCEDURE — 1126F AMNT PAIN NOTED NONE PRSNT: CPT | Mod: CPTII,S$GLB,, | Performed by: OPHTHALMOLOGY

## 2023-11-07 PROCEDURE — 99024 PR POST-OP FOLLOW-UP VISIT: ICD-10-PCS | Mod: S$GLB,,, | Performed by: OPHTHALMOLOGY

## 2023-11-07 PROCEDURE — 4010F ACE/ARB THERAPY RXD/TAKEN: CPT | Mod: CPTII,S$GLB,, | Performed by: OPHTHALMOLOGY

## 2023-11-07 PROCEDURE — 1101F PR PT FALLS ASSESS DOC 0-1 FALLS W/OUT INJ PAST YR: ICD-10-PCS | Mod: CPTII,S$GLB,, | Performed by: OPHTHALMOLOGY

## 2023-11-07 PROCEDURE — 1159F MED LIST DOCD IN RCRD: CPT | Mod: CPTII,S$GLB,, | Performed by: OPHTHALMOLOGY

## 2023-11-07 PROCEDURE — 1160F RVW MEDS BY RX/DR IN RCRD: CPT | Mod: CPTII,S$GLB,, | Performed by: OPHTHALMOLOGY

## 2023-11-07 PROCEDURE — 1126F PR PAIN SEVERITY QUANTIFIED, NO PAIN PRESENT: ICD-10-PCS | Mod: CPTII,S$GLB,, | Performed by: OPHTHALMOLOGY

## 2023-11-07 PROCEDURE — 99999 PR PBB SHADOW E&M-EST. PATIENT-LVL III: ICD-10-PCS | Mod: PBBFAC,,, | Performed by: OPHTHALMOLOGY

## 2023-11-07 PROCEDURE — 3044F PR MOST RECENT HEMOGLOBIN A1C LEVEL <7.0%: ICD-10-PCS | Mod: CPTII,S$GLB,, | Performed by: OPHTHALMOLOGY

## 2023-11-07 PROCEDURE — 3044F HG A1C LEVEL LT 7.0%: CPT | Mod: CPTII,S$GLB,, | Performed by: OPHTHALMOLOGY

## 2023-11-07 PROCEDURE — 4010F PR ACE/ARB THEARPY RXD/TAKEN: ICD-10-PCS | Mod: CPTII,S$GLB,, | Performed by: OPHTHALMOLOGY

## 2023-11-07 PROCEDURE — 3288F FALL RISK ASSESSMENT DOCD: CPT | Mod: CPTII,S$GLB,, | Performed by: OPHTHALMOLOGY

## 2023-11-07 PROCEDURE — 1159F PR MEDICATION LIST DOCUMENTED IN MEDICAL RECORD: ICD-10-PCS | Mod: CPTII,S$GLB,, | Performed by: OPHTHALMOLOGY

## 2023-11-07 PROCEDURE — 1101F PT FALLS ASSESS-DOCD LE1/YR: CPT | Mod: CPTII,S$GLB,, | Performed by: OPHTHALMOLOGY

## 2023-11-07 NOTE — PROGRESS NOTES
HPI     01/24/2022 IMPLANT: CTR Type 14C/ CNA0T0 24.0 OD  10/30/2023 IOL repositioning and scleral suture fixation OS    Gtt's:  Ofloxacin QID OS  PF QID OS    Patient here for 1 week phaco repositioning OS.  Pt. States vision is blurry and see a glare  Pt. Denies pain or discomfort.       Last edited by Jamia Haider on 11/7/2023 10:31 AM.            Assessment /Plan     For exam results, see Encounter Report.    Dislocated IOL (intraocular lens), posterior, left    Post-operative state      POW 1 IOL fixation to sclera with nasal decentration.  IOL stable so monitor for now, but plan return to OR for temporal fixation suture  PF  qid

## 2023-11-12 ENCOUNTER — PATIENT MESSAGE (OUTPATIENT)
Dept: OPHTHALMOLOGY | Facility: CLINIC | Age: 68
End: 2023-11-12
Payer: MEDICARE

## 2023-11-14 ENCOUNTER — OFFICE VISIT (OUTPATIENT)
Dept: OPHTHALMOLOGY | Facility: CLINIC | Age: 68
End: 2023-11-14
Payer: MEDICARE

## 2023-11-14 DIAGNOSIS — H27.112 SUBLUXATION OF LEFT LENS: Primary | ICD-10-CM

## 2023-11-14 PROCEDURE — 3044F HG A1C LEVEL LT 7.0%: CPT | Mod: CPTII,S$GLB,, | Performed by: OPHTHALMOLOGY

## 2023-11-14 PROCEDURE — 1159F MED LIST DOCD IN RCRD: CPT | Mod: CPTII,S$GLB,, | Performed by: OPHTHALMOLOGY

## 2023-11-14 PROCEDURE — 3288F FALL RISK ASSESSMENT DOCD: CPT | Mod: CPTII,S$GLB,, | Performed by: OPHTHALMOLOGY

## 2023-11-14 PROCEDURE — 99024 POSTOP FOLLOW-UP VISIT: CPT | Mod: S$GLB,,, | Performed by: OPHTHALMOLOGY

## 2023-11-14 PROCEDURE — 4010F ACE/ARB THERAPY RXD/TAKEN: CPT | Mod: CPTII,S$GLB,, | Performed by: OPHTHALMOLOGY

## 2023-11-14 PROCEDURE — 1101F PR PT FALLS ASSESS DOC 0-1 FALLS W/OUT INJ PAST YR: ICD-10-PCS | Mod: CPTII,S$GLB,, | Performed by: OPHTHALMOLOGY

## 2023-11-14 PROCEDURE — 1159F PR MEDICATION LIST DOCUMENTED IN MEDICAL RECORD: ICD-10-PCS | Mod: CPTII,S$GLB,, | Performed by: OPHTHALMOLOGY

## 2023-11-14 PROCEDURE — 3044F PR MOST RECENT HEMOGLOBIN A1C LEVEL <7.0%: ICD-10-PCS | Mod: CPTII,S$GLB,, | Performed by: OPHTHALMOLOGY

## 2023-11-14 PROCEDURE — 1126F PR PAIN SEVERITY QUANTIFIED, NO PAIN PRESENT: ICD-10-PCS | Mod: CPTII,S$GLB,, | Performed by: OPHTHALMOLOGY

## 2023-11-14 PROCEDURE — 99999 PR PBB SHADOW E&M-EST. PATIENT-LVL III: CPT | Mod: PBBFAC,,, | Performed by: OPHTHALMOLOGY

## 2023-11-14 PROCEDURE — 99024 PR POST-OP FOLLOW-UP VISIT: ICD-10-PCS | Mod: S$GLB,,, | Performed by: OPHTHALMOLOGY

## 2023-11-14 PROCEDURE — 4010F PR ACE/ARB THEARPY RXD/TAKEN: ICD-10-PCS | Mod: CPTII,S$GLB,, | Performed by: OPHTHALMOLOGY

## 2023-11-14 PROCEDURE — 1160F RVW MEDS BY RX/DR IN RCRD: CPT | Mod: CPTII,S$GLB,, | Performed by: OPHTHALMOLOGY

## 2023-11-14 PROCEDURE — 3288F PR FALLS RISK ASSESSMENT DOCUMENTED: ICD-10-PCS | Mod: CPTII,S$GLB,, | Performed by: OPHTHALMOLOGY

## 2023-11-14 PROCEDURE — 1101F PT FALLS ASSESS-DOCD LE1/YR: CPT | Mod: CPTII,S$GLB,, | Performed by: OPHTHALMOLOGY

## 2023-11-14 PROCEDURE — 99999 PR PBB SHADOW E&M-EST. PATIENT-LVL III: ICD-10-PCS | Mod: PBBFAC,,, | Performed by: OPHTHALMOLOGY

## 2023-11-14 PROCEDURE — 1160F PR REVIEW ALL MEDS BY PRESCRIBER/CLIN PHARMACIST DOCUMENTED: ICD-10-PCS | Mod: CPTII,S$GLB,, | Performed by: OPHTHALMOLOGY

## 2023-11-14 PROCEDURE — 1126F AMNT PAIN NOTED NONE PRSNT: CPT | Mod: CPTII,S$GLB,, | Performed by: OPHTHALMOLOGY

## 2023-11-14 NOTE — PROGRESS NOTES
HPI     01/24/2022 IMPLANT: CTR Type 14C/ CNA0T0 24.0 OD  10/30/2023 IOL repositioning and scleral suture fixation OS    Gtt's:  Ofloxacin QID OS  PF QID OS    Patient here for 1 month phaco repositioning OS.  Pt. States vision is blurry and see a glare  Pt. Denies pain or discomfort.       Last edited by Raisa Platt on 11/14/2023 12:24 PM.            Assessment /Plan     For exam results, see Encounter Report.    Subluxation of left lens      POW2 Subluxed IOL/bag with prolene suture fixation, now subluxed again.    Refer to retina for PPV/IOL removal/Scleral IOL implantation  OK to consider combined ant. Seg/post seg surgery

## 2023-11-30 ENCOUNTER — OFFICE VISIT (OUTPATIENT)
Dept: OPHTHALMOLOGY | Facility: CLINIC | Age: 68
End: 2023-11-30
Payer: MEDICARE

## 2023-11-30 ENCOUNTER — TELEPHONE (OUTPATIENT)
Dept: OPHTHALMOLOGY | Facility: CLINIC | Age: 68
End: 2023-11-30

## 2023-11-30 DIAGNOSIS — H33.22 RETINAL DETACHMENT, LEFT: ICD-10-CM

## 2023-11-30 DIAGNOSIS — H27.112 SUBLUXATION OF LEFT LENS: Primary | ICD-10-CM

## 2023-11-30 DIAGNOSIS — Q87.40 MARFAN SYNDROME: ICD-10-CM

## 2023-11-30 DIAGNOSIS — H43.811 VITREOUS DEGENERATION, RIGHT: ICD-10-CM

## 2023-11-30 DIAGNOSIS — Z96.1 PSEUDOPHAKIA OF BOTH EYES: ICD-10-CM

## 2023-11-30 DIAGNOSIS — T85.22XA DISLOCATED IOL (INTRAOCULAR LENS), POSTERIOR, LEFT: Primary | ICD-10-CM

## 2023-11-30 PROCEDURE — 92201 PR OPHTHALMOSCOPY, EXT, W/RET DRAW/SCLERAL DEPR, I&R, UNI/BI: ICD-10-PCS | Mod: S$GLB,,, | Performed by: OPHTHALMOLOGY

## 2023-11-30 PROCEDURE — 3288F FALL RISK ASSESSMENT DOCD: CPT | Mod: CPTII,S$GLB,, | Performed by: OPHTHALMOLOGY

## 2023-11-30 PROCEDURE — 99214 OFFICE O/P EST MOD 30 MIN: CPT | Mod: 24,S$GLB,, | Performed by: OPHTHALMOLOGY

## 2023-11-30 PROCEDURE — 1126F PR PAIN SEVERITY QUANTIFIED, NO PAIN PRESENT: ICD-10-PCS | Mod: CPTII,S$GLB,, | Performed by: OPHTHALMOLOGY

## 2023-11-30 PROCEDURE — 3044F PR MOST RECENT HEMOGLOBIN A1C LEVEL <7.0%: ICD-10-PCS | Mod: CPTII,S$GLB,, | Performed by: OPHTHALMOLOGY

## 2023-11-30 PROCEDURE — 1159F MED LIST DOCD IN RCRD: CPT | Mod: CPTII,S$GLB,, | Performed by: OPHTHALMOLOGY

## 2023-11-30 PROCEDURE — 1160F PR REVIEW ALL MEDS BY PRESCRIBER/CLIN PHARMACIST DOCUMENTED: ICD-10-PCS | Mod: CPTII,S$GLB,, | Performed by: OPHTHALMOLOGY

## 2023-11-30 PROCEDURE — 1101F PR PT FALLS ASSESS DOC 0-1 FALLS W/OUT INJ PAST YR: ICD-10-PCS | Mod: CPTII,S$GLB,, | Performed by: OPHTHALMOLOGY

## 2023-11-30 PROCEDURE — 3044F HG A1C LEVEL LT 7.0%: CPT | Mod: CPTII,S$GLB,, | Performed by: OPHTHALMOLOGY

## 2023-11-30 PROCEDURE — 1159F PR MEDICATION LIST DOCUMENTED IN MEDICAL RECORD: ICD-10-PCS | Mod: CPTII,S$GLB,, | Performed by: OPHTHALMOLOGY

## 2023-11-30 PROCEDURE — 3288F PR FALLS RISK ASSESSMENT DOCUMENTED: ICD-10-PCS | Mod: CPTII,S$GLB,, | Performed by: OPHTHALMOLOGY

## 2023-11-30 PROCEDURE — 4010F PR ACE/ARB THEARPY RXD/TAKEN: ICD-10-PCS | Mod: CPTII,S$GLB,, | Performed by: OPHTHALMOLOGY

## 2023-11-30 PROCEDURE — 1160F RVW MEDS BY RX/DR IN RCRD: CPT | Mod: CPTII,S$GLB,, | Performed by: OPHTHALMOLOGY

## 2023-11-30 PROCEDURE — 1101F PT FALLS ASSESS-DOCD LE1/YR: CPT | Mod: CPTII,S$GLB,, | Performed by: OPHTHALMOLOGY

## 2023-11-30 PROCEDURE — 92201 OPSCPY EXTND RTA DRAW UNI/BI: CPT | Mod: S$GLB,,, | Performed by: OPHTHALMOLOGY

## 2023-11-30 PROCEDURE — 99999 PR PBB SHADOW E&M-EST. PATIENT-LVL III: CPT | Mod: PBBFAC,,, | Performed by: OPHTHALMOLOGY

## 2023-11-30 PROCEDURE — 92134 CPTRZ OPH DX IMG PST SGM RTA: CPT | Mod: S$GLB,,, | Performed by: OPHTHALMOLOGY

## 2023-11-30 PROCEDURE — 99999 PR PBB SHADOW E&M-EST. PATIENT-LVL III: ICD-10-PCS | Mod: PBBFAC,,, | Performed by: OPHTHALMOLOGY

## 2023-11-30 PROCEDURE — 4010F ACE/ARB THERAPY RXD/TAKEN: CPT | Mod: CPTII,S$GLB,, | Performed by: OPHTHALMOLOGY

## 2023-11-30 PROCEDURE — 1126F AMNT PAIN NOTED NONE PRSNT: CPT | Mod: CPTII,S$GLB,, | Performed by: OPHTHALMOLOGY

## 2023-11-30 PROCEDURE — 99214 PR OFFICE/OUTPT VISIT, EST, LEVL IV, 30-39 MIN: ICD-10-PCS | Mod: 24,S$GLB,, | Performed by: OPHTHALMOLOGY

## 2023-11-30 PROCEDURE — 92134 OCT, RETINA - OU - BOTH EYES: ICD-10-PCS | Mod: S$GLB,,, | Performed by: OPHTHALMOLOGY

## 2023-11-30 NOTE — PROGRESS NOTES
HPI     Consult for Subluxation of Lens OS    DLS 11/14/2023 by Dr. Amaya Gallardo MD    CC: pt reports : vision is blurry OS due to lens placement fell    ++blurred Va  -diplopia  -eye pain   -flashes/++floaters (suture appearance of a rope)  -headaches  -curtain/shadow/veils    Eye Gtt's: PF TID OS     POHx:   Dislocation of IOL ( Posterior OS)  S/P phaco repositioning OS.  Last edited by Anali Brown MA on 11/30/2023 10:39 AM.         A/P    ICD-10-CM ICD-9-CM   1. Dislocated IOL (intraocular lens), posterior, left  T85.22XA 996.53   2. Marfan syndrome  Q87.40 759.82   3. Retinal detachment, left  H33.22 361.9   4. Pseudophakia of both eyes  Z96.1 V43.1   5. Vitreous degeneration, right  H43.811 379.21       1. Dislocated IOL (intraocular lens), posterior, left  2. Marfan syndrome  Referral from Dr. Gallardo for dislocated lens eval - Recent notes reviewed    Pt has had lens repositioning after CEIOL this past fall, now with further dislocated lens out of visual axis    Exam today notable for inf dislocated lens still attached by inferior prolene, lodged in inferior vit base, no RT/RD on exam     Plan: counseled at length regarding dislocated lens and options of aphakic contact lens vs vitrectomy with IOL explant/2ndary lens implantation, pt wishes to go with vitrectomy. Aiming for Dec 11th under mac local anesthesia, will get MA60AC calcs today     Risks, benefits and alternatives to surgery and anesthesia including no treatment were discussed with the patient including: death, coma, blindness, bleeding, retinal detachment, cataract, need for more surgeries and glaucoma. The patient understands and accepts risks All questions were answered and the patient wishes to proceed with surgery    Recommend Pars Plana Vitrectomy / Intraocular lens removal, secondary lens implantation Left Eye   R/B/A to surgery and anesthesia discussed with patient including: death, coma, blindness, bleeding, retinal detachment, cataract,  and glaucoma Patient understands and accepts risks All questions answered Patient wishes to proceed with surgery      3. Retinal detachment, left  Remote hx of RD repair with Dr. Cristobal 12 yrs ago  Attached today, good laser no new RT/RD  Plan: Observation  RD precautions discussed in detail, patient expressed understanding  RTC immediately PRN (especially ANY change flashes, floaters, vision, visual field)     4. Pseudophakia of both eyes  Good lens position OD, no pseudophacodonesis  Dislocated lens OS  Plan: as above OS for dislocated lens, observe OD    5. Vitreous degeneration, right  No RT/RD, good 360 barricade laser  Plan: Observation    RTC post-op        I saw and examined the patient and reviewed in detail the findings documented. The final examination findings, image interpretations which have been independently interpreted, and plan as documented in the record represent my personal judgment and conclusions.    Roddy Ware MD  Vitreoretinal Surgery   [facility]

## 2023-12-08 ENCOUNTER — TELEPHONE (OUTPATIENT)
Dept: OPHTHALMOLOGY | Facility: CLINIC | Age: 68
End: 2023-12-08
Payer: MEDICARE

## 2023-12-08 ENCOUNTER — ANESTHESIA EVENT (OUTPATIENT)
Dept: SURGERY | Facility: HOSPITAL | Age: 68
End: 2023-12-08
Payer: MEDICARE

## 2023-12-08 NOTE — PRE-PROCEDURE INSTRUCTIONS
PreOp Instructions given:   - Verbal medication information (what to hold and what to take)   - NPO guidelines 2400  - Arrival place directions given; time to be given the day before procedure by the   Surgeon's Office [facility]  - Bathing with antibacterial soap   - Don't wear any jewelry or bring any valuables AM of surgery   - No makeup or moisturizer to face   - No perfume/cologne, powder, lotions or aftershave   Pt. verbalized understanding.   Pt reports PONV and slow to awaken w/GETA  Patient does not know arrival time.  Explained that this information comes from the surgeon's office and if they haven't heard from them by 2 or 3 pm to call the office.  Patient stated an understanding.

## 2023-12-08 NOTE — ANESTHESIA PREPROCEDURE EVALUATION
12/08/2023  Mc Jeter is a 68 y.o., male.      Pre-op Assessment          Review of Systems  Anesthesia Hx:  No problems with previous Anesthesia   History of prior surgery of interest to airway management or planning:           Personal Hx of Anesthesia complications, Post-Operative Nausea/Vomiting (with general anesthesia), with every anesthetic, treatment not known          Slow To Awaken/Delayed Emergence and moderate, did not delay extubation, but prolonged PACU stay          Cardiovascular:     Hypertension                                        Pulmonary:     Denies Asthma.    Denies Recent URI. Sleep Apnea                Neurological:  Neurology Normal                                      Endocrine:  Diabetes, well controlled               Physical Exam  General: Alert, Oriented and Well nourished    Airway:  Mallampati: II   Mouth Opening: Normal  TM Distance: Normal  Neck ROM: Normal ROM    Dental:  Intact    Chest/Lungs:  Normal Respiratory Rate    Heart:  Rate: Normal  Rhythm: Regular Rhythm        Anesthesia Plan  Type of Anesthesia, risks & benefits discussed:    Anesthesia Type: MAC, Gen Natural Airway  Intra-op Monitoring Plan: Standard ASA Monitors  Post Op Pain Control Plan: multimodal analgesia and IV/PO Opioids PRN  Informed Consent: Informed consent signed with the Patient and all parties understand the risks and agree with anesthesia plan.  All questions answered.   ASA Score: 2  Day of Surgery Review of History & Physical: H&P Update referred to the surgeon/provider.    Ready For Surgery From Anesthesia Perspective.     .  Pt reports PONV and slow to awaken w/GETA

## 2023-12-10 RX ORDER — PHENYLEPHRINE HYDROCHLORIDE 25 MG/ML
1 SOLUTION/ DROPS OPHTHALMIC
Status: CANCELLED | OUTPATIENT
Start: 2023-12-10

## 2023-12-10 RX ORDER — TETRACAINE HYDROCHLORIDE 5 MG/ML
1 SOLUTION OPHTHALMIC
Status: CANCELLED | OUTPATIENT
Start: 2023-12-10

## 2023-12-10 RX ORDER — MOXIFLOXACIN 5 MG/ML
1 SOLUTION/ DROPS OPHTHALMIC
Status: CANCELLED | OUTPATIENT
Start: 2023-12-10

## 2023-12-10 RX ORDER — PREDNISOLONE ACETATE 10 MG/ML
1 SUSPENSION/ DROPS OPHTHALMIC
Status: CANCELLED | OUTPATIENT
Start: 2023-12-10

## 2023-12-10 RX ORDER — CYCLOPENTOLATE HYDROCHLORIDE 10 MG/ML
1 SOLUTION/ DROPS OPHTHALMIC
Status: CANCELLED | OUTPATIENT
Start: 2023-12-10

## 2023-12-11 ENCOUNTER — HOSPITAL ENCOUNTER (OUTPATIENT)
Facility: HOSPITAL | Age: 68
Discharge: HOME | End: 2023-12-11
Attending: OPHTHALMOLOGY | Admitting: OPHTHALMOLOGY
Payer: MEDICARE

## 2023-12-11 ENCOUNTER — ANESTHESIA (OUTPATIENT)
Dept: SURGERY | Facility: HOSPITAL | Age: 68
End: 2023-12-11
Payer: MEDICARE

## 2023-12-11 VITALS
DIASTOLIC BLOOD PRESSURE: 89 MMHG | OXYGEN SATURATION: 94 % | RESPIRATION RATE: 18 BRPM | WEIGHT: 201 LBS | SYSTOLIC BLOOD PRESSURE: 148 MMHG | TEMPERATURE: 98 F | BODY MASS INDEX: 23.23 KG/M2 | HEART RATE: 61 BPM

## 2023-12-11 DIAGNOSIS — T85.22XA DISLOCATED IOL (INTRAOCULAR LENS), POSTERIOR, LEFT: ICD-10-CM

## 2023-12-11 LAB
POCT GLUCOSE: 172 MG/DL (ref 70–110)
POCT GLUCOSE: 184 MG/DL (ref 70–110)

## 2023-12-11 PROCEDURE — 63600175 PHARM REV CODE 636 W HCPCS: Performed by: NURSE ANESTHETIST, CERTIFIED REGISTERED

## 2023-12-11 PROCEDURE — D9220A PRA ANESTHESIA: ICD-10-PCS | Mod: CRNA,,, | Performed by: NURSE ANESTHETIST, CERTIFIED REGISTERED

## 2023-12-11 PROCEDURE — 66825 REPOSITION INTRAOCULAR LENS: CPT | Mod: 58,51,LT, | Performed by: OPHTHALMOLOGY

## 2023-12-11 PROCEDURE — D9220A PRA ANESTHESIA: Mod: ANES,,, | Performed by: ANESTHESIOLOGY

## 2023-12-11 PROCEDURE — 82962 GLUCOSE BLOOD TEST: CPT | Performed by: OPHTHALMOLOGY

## 2023-12-11 PROCEDURE — 36000706: Performed by: OPHTHALMOLOGY

## 2023-12-11 PROCEDURE — D9220A PRA ANESTHESIA: ICD-10-PCS | Mod: ANES,,, | Performed by: ANESTHESIOLOGY

## 2023-12-11 PROCEDURE — 36000707: Performed by: OPHTHALMOLOGY

## 2023-12-11 PROCEDURE — 25000003 PHARM REV CODE 250: Performed by: NURSE ANESTHETIST, CERTIFIED REGISTERED

## 2023-12-11 PROCEDURE — 66825 PR REPOSITION INTRAOCULAR LENS W INCIS: ICD-10-PCS | Mod: 58,51,LT, | Performed by: OPHTHALMOLOGY

## 2023-12-11 PROCEDURE — 67036 REMOVAL OF INNER EYE FLUID: CPT | Mod: 58,LT,, | Performed by: OPHTHALMOLOGY

## 2023-12-11 PROCEDURE — 25000003 PHARM REV CODE 250: Performed by: OPHTHALMOLOGY

## 2023-12-11 PROCEDURE — 71000044 HC DOSC ROUTINE RECOVERY FIRST HOUR: Performed by: OPHTHALMOLOGY

## 2023-12-11 PROCEDURE — 63600175 PHARM REV CODE 636 W HCPCS: Performed by: OPHTHALMOLOGY

## 2023-12-11 PROCEDURE — 67036 PR VITRECTOMY,MECHANICAL: ICD-10-PCS | Mod: 58,LT,, | Performed by: OPHTHALMOLOGY

## 2023-12-11 PROCEDURE — D9220A PRA ANESTHESIA: Mod: CRNA,,, | Performed by: NURSE ANESTHETIST, CERTIFIED REGISTERED

## 2023-12-11 PROCEDURE — 37000009 HC ANESTHESIA EA ADD 15 MINS: Performed by: OPHTHALMOLOGY

## 2023-12-11 PROCEDURE — 27201423 OPTIME MED/SURG SUP & DEVICES STERILE SUPPLY: Performed by: OPHTHALMOLOGY

## 2023-12-11 PROCEDURE — 37000008 HC ANESTHESIA 1ST 15 MINUTES: Performed by: OPHTHALMOLOGY

## 2023-12-11 PROCEDURE — 71000015 HC POSTOP RECOV 1ST HR: Performed by: OPHTHALMOLOGY

## 2023-12-11 RX ORDER — MIDAZOLAM HYDROCHLORIDE 1 MG/ML
INJECTION, SOLUTION INTRAMUSCULAR; INTRAVENOUS
Status: DISCONTINUED | OUTPATIENT
Start: 2023-12-11 | End: 2023-12-11

## 2023-12-11 RX ORDER — DEXAMETHASONE SODIUM PHOSPHATE 4 MG/ML
INJECTION, SOLUTION INTRA-ARTICULAR; INTRALESIONAL; INTRAMUSCULAR; INTRAVENOUS; SOFT TISSUE
Status: DISCONTINUED
Start: 2023-12-11 | End: 2023-12-11 | Stop reason: HOSPADM

## 2023-12-11 RX ORDER — VANCOMYCIN HYDROCHLORIDE 500 MG/10ML
INJECTION, POWDER, LYOPHILIZED, FOR SOLUTION INTRAVENOUS
Status: DISCONTINUED
Start: 2023-12-11 | End: 2023-12-11 | Stop reason: HOSPADM

## 2023-12-11 RX ORDER — DEXAMETHASONE SODIUM PHOSPHATE 4 MG/ML
INJECTION, SOLUTION INTRA-ARTICULAR; INTRALESIONAL; INTRAMUSCULAR; INTRAVENOUS; SOFT TISSUE
Status: DISCONTINUED | OUTPATIENT
Start: 2023-12-11 | End: 2023-12-11 | Stop reason: HOSPADM

## 2023-12-11 RX ORDER — MOXIFLOXACIN 5 MG/ML
1 SOLUTION/ DROPS OPHTHALMIC
Status: DISCONTINUED | OUTPATIENT
Start: 2023-12-11 | End: 2023-12-11 | Stop reason: HOSPADM

## 2023-12-11 RX ORDER — LIDOCAINE HYDROCHLORIDE 20 MG/ML
INJECTION, SOLUTION EPIDURAL; INFILTRATION; INTRACAUDAL; PERINEURAL
Status: DISCONTINUED
Start: 2023-12-11 | End: 2023-12-11 | Stop reason: WASHOUT

## 2023-12-11 RX ORDER — LIDOCAINE HYDROCHLORIDE 20 MG/ML
INJECTION INTRAVENOUS
Status: DISCONTINUED | OUTPATIENT
Start: 2023-12-11 | End: 2023-12-11

## 2023-12-11 RX ORDER — ONDANSETRON 2 MG/ML
INJECTION INTRAMUSCULAR; INTRAVENOUS
Status: DISCONTINUED | OUTPATIENT
Start: 2023-12-11 | End: 2023-12-11

## 2023-12-11 RX ORDER — INDOCYANINE GREEN AND WATER 25 MG
KIT INJECTION
Status: DISCONTINUED
Start: 2023-12-11 | End: 2023-12-11 | Stop reason: HOSPADM

## 2023-12-11 RX ORDER — ONDANSETRON 2 MG/ML
4 INJECTION INTRAMUSCULAR; INTRAVENOUS DAILY PRN
Status: DISCONTINUED | OUTPATIENT
Start: 2023-12-11 | End: 2023-12-11 | Stop reason: HOSPADM

## 2023-12-11 RX ORDER — NEOMYCIN SULFATE, POLYMYXIN B SULFATE, AND DEXAMETHASONE 3.5; 10000; 1 MG/G; [USP'U]/G; MG/G
OINTMENT OPHTHALMIC
Status: DISCONTINUED
Start: 2023-12-11 | End: 2023-12-11 | Stop reason: HOSPADM

## 2023-12-11 RX ORDER — PROPOFOL 10 MG/ML
VIAL (ML) INTRAVENOUS
Status: DISCONTINUED | OUTPATIENT
Start: 2023-12-11 | End: 2023-12-11

## 2023-12-11 RX ORDER — PHENYLEPHRINE HYDROCHLORIDE 25 MG/ML
1 SOLUTION/ DROPS OPHTHALMIC
Status: DISCONTINUED | OUTPATIENT
Start: 2023-12-11 | End: 2023-12-11 | Stop reason: HOSPADM

## 2023-12-11 RX ORDER — FENTANYL CITRATE 50 UG/ML
INJECTION, SOLUTION INTRAMUSCULAR; INTRAVENOUS
Status: DISCONTINUED | OUTPATIENT
Start: 2023-12-11 | End: 2023-12-11

## 2023-12-11 RX ORDER — BUPIVACAINE HYDROCHLORIDE 7.5 MG/ML
INJECTION, SOLUTION EPIDURAL; RETROBULBAR
Status: DISCONTINUED
Start: 2023-12-11 | End: 2023-12-11 | Stop reason: HOSPADM

## 2023-12-11 RX ORDER — BUPIVACAINE HYDROCHLORIDE 7.5 MG/ML
INJECTION, SOLUTION EPIDURAL; RETROBULBAR
Status: DISCONTINUED | OUTPATIENT
Start: 2023-12-11 | End: 2023-12-11 | Stop reason: HOSPADM

## 2023-12-11 RX ORDER — PREDNISOLONE ACETATE 10 MG/ML
1 SUSPENSION/ DROPS OPHTHALMIC
Status: DISCONTINUED | OUTPATIENT
Start: 2023-12-11 | End: 2023-12-11 | Stop reason: HOSPADM

## 2023-12-11 RX ORDER — TETRACAINE HYDROCHLORIDE 5 MG/ML
1 SOLUTION OPHTHALMIC
Status: DISCONTINUED | OUTPATIENT
Start: 2023-12-11 | End: 2023-12-11 | Stop reason: HOSPADM

## 2023-12-11 RX ORDER — CYCLOPENTOLATE HYDROCHLORIDE 10 MG/ML
1 SOLUTION/ DROPS OPHTHALMIC
Status: DISCONTINUED | OUTPATIENT
Start: 2023-12-11 | End: 2023-12-11 | Stop reason: HOSPADM

## 2023-12-11 RX ORDER — SODIUM CHLORIDE 0.9 % (FLUSH) 0.9 %
3 SYRINGE (ML) INJECTION EVERY 30 MIN PRN
Status: DISCONTINUED | OUTPATIENT
Start: 2023-12-11 | End: 2023-12-11 | Stop reason: HOSPADM

## 2023-12-11 RX ORDER — LIDOCAINE HYDROCHLORIDE 20 MG/ML
INJECTION, SOLUTION EPIDURAL; INFILTRATION; INTRACAUDAL; PERINEURAL
Status: DISCONTINUED | OUTPATIENT
Start: 2023-12-11 | End: 2023-12-11 | Stop reason: HOSPADM

## 2023-12-11 RX ORDER — EPINEPHRINE 1 MG/ML
INJECTION, SOLUTION, CONCENTRATE INTRAVENOUS
Status: DISCONTINUED
Start: 2023-12-11 | End: 2023-12-11 | Stop reason: HOSPADM

## 2023-12-11 RX ADMIN — SODIUM CHLORIDE: 0.9 INJECTION, SOLUTION INTRAVENOUS at 10:12

## 2023-12-11 RX ADMIN — MOXIFLOXACIN OPHTHALMIC 1 DROP: 5 SOLUTION/ DROPS OPHTHALMIC at 10:12

## 2023-12-11 RX ADMIN — CYCLOPENTOLATE HYDROCHLORIDE 1 DROP: 10 SOLUTION/ DROPS OPHTHALMIC at 09:12

## 2023-12-11 RX ADMIN — LIDOCAINE HYDROCHLORIDE 100 MG: 20 INJECTION INTRAVENOUS at 11:12

## 2023-12-11 RX ADMIN — FENTANYL CITRATE 50 MCG: 50 INJECTION, SOLUTION INTRAMUSCULAR; INTRAVENOUS at 12:12

## 2023-12-11 RX ADMIN — FENTANYL CITRATE 25 MCG: 50 INJECTION, SOLUTION INTRAMUSCULAR; INTRAVENOUS at 11:12

## 2023-12-11 RX ADMIN — MIDAZOLAM HYDROCHLORIDE 1 MG: 1 INJECTION, SOLUTION INTRAMUSCULAR; INTRAVENOUS at 11:12

## 2023-12-11 RX ADMIN — FENTANYL CITRATE 50 MCG: 50 INJECTION, SOLUTION INTRAMUSCULAR; INTRAVENOUS at 11:12

## 2023-12-11 RX ADMIN — CYCLOPENTOLATE HYDROCHLORIDE 1 DROP: 10 SOLUTION/ DROPS OPHTHALMIC at 10:12

## 2023-12-11 RX ADMIN — PHENYLEPHRINE HYDROCHLORIDE 1 DROP: 25 SOLUTION/ DROPS OPHTHALMIC at 10:12

## 2023-12-11 RX ADMIN — SODIUM CHLORIDE, SODIUM GLUCONATE, SODIUM ACETATE, POTASSIUM CHLORIDE, MAGNESIUM CHLORIDE, SODIUM PHOSPHATE, DIBASIC, AND POTASSIUM PHOSPHATE: .53; .5; .37; .037; .03; .012; .00082 INJECTION, SOLUTION INTRAVENOUS at 11:12

## 2023-12-11 RX ADMIN — TETRACAINE HYDROCHLORIDE 1 DROP: 5 SOLUTION OPHTHALMIC at 09:12

## 2023-12-11 RX ADMIN — MOXIFLOXACIN OPHTHALMIC 1 DROP: 5 SOLUTION/ DROPS OPHTHALMIC at 09:12

## 2023-12-11 RX ADMIN — PREDNISOLONE ACETATE 1 DROP: 10 SUSPENSION/ DROPS OPHTHALMIC at 09:12

## 2023-12-11 RX ADMIN — PROPOFOL 20 MG: 10 INJECTION, EMULSION INTRAVENOUS at 11:12

## 2023-12-11 RX ADMIN — PREDNISOLONE ACETATE 1 DROP: 10 SUSPENSION/ DROPS OPHTHALMIC at 10:12

## 2023-12-11 RX ADMIN — ONDANSETRON 4 MG: 2 INJECTION INTRAMUSCULAR; INTRAVENOUS at 12:12

## 2023-12-11 RX ADMIN — PHENYLEPHRINE HYDROCHLORIDE 1 DROP: 25 SOLUTION/ DROPS OPHTHALMIC at 09:12

## 2023-12-11 RX ADMIN — PROPOFOL 50 MG: 10 INJECTION, EMULSION INTRAVENOUS at 11:12

## 2023-12-11 NOTE — BRIEF OP NOTE
Brief Operative Note  Ophthalmology     Pre-Op Dx: dislocated lens left     Post Op Dx: same     Procedure Performed: vitrectomy, lens reposition left     Attending Surgeon: Roddy Ware MD     Assistant Surgeon: Kareem Polo MD    Anesthesia: Local/Mac, retrobulbar injection of 10cc mixture 0.75%Marcaine, 2% Xylocaine    Estimated blood loss: Minimal    Complications: None    Specimen: None    Disposition: Stable to recovery    Findings/Outcome: retina attached, lens repositioned    Date of Discharge: 12/11/2023     Discharge Disposition: home    F/U: tomorrow AM with Dr Ware

## 2023-12-11 NOTE — OP NOTE
PATIENT NAME: Mc Jeter      MEDICAL RECORD NUMBER: [id number]     Date of Surgery 12/11/2023     ATTENDING SURGEON: Roddy Ware MD (A)    ASSISTANT SURGEON: Kareem Polo MD (F)    PREOPERATIVE DIAGNOSIS:  Dislocated intraocular lens of the Left  eye.    OPERATION:  Pars plana vitrectomy, lens reposition of the Left  eye.    POSTOPERATIVE DIAGNOSIS:  Same    ANESTHESIA:  Monitored anesthesia care    COMPLICATIONS:  None.    SPECIMENS:  None.    EBL:  Minimal      Indications for Surgery  Mc Jeter   is a 68 y.o.  year old presenting with history of Marfan's syndrome, retinal detachment, and recent cataract surgery with now dislocated intraocular lens of the Left eye. The recommendation was for surgical repair. After the risks, benefits and alternatives were discussed with Mc Jeter  the patient consented to the procedure and was scheduled for surgery.     Description of Procedure:    The patient, Mc Jeter  gave informed consent for the following operation which was  performed under Monitored anesthesia care. The patient was examined with an indirect ophthalmoscope and we decided to proceed with surgery. A time out was performed and confirmed by all in the room that the Left  eye was the correct operative eye. Following an initial infusion of propofol, the patient was given a retrobulbar injection of an equal mixture of 0.75% Marcaine and 4% lidocaine and then the eye was prepped and draped in the usual sterile fashion for vitrectomy surgery.     The cornea was kept moist with OVD placed on the surface throughout the operation. A 25 gauge vitrectomy system was used. Initial entry into the eye was gained with 3 trocars placed 3 mm from the limbus. In the inferotemporal location, the infusion cannula was secured in position. The open end of the cannula was visualized through the dilated pupil and the infusion was turned on when it was in the correct position. The 2 superior sclerotomies were used for  entry of a light pipe and a vitrectomy cutter.  Upon entry into the eye, the dislocated lens and CTR complex in the bag was noted to be fixated inferiorly and in the vitreous base. This was followed by additional peripheral vitrectomy out towards the vitreous base to release the lens from the vitreous.      We inspected the retinal periphery with scleral depression. No retinal breaks were noted.     Superiorly a focal peritomy was created and using [place] blade and crescent blade, a scleral flap was created. Iris hooks were inserted. The lens was brought anteriorly with intraocular forceps. A paracentesis was created at 6oclock, followed by instillation of dispersive OVD, and 10-0 prolene was passed through the bag and lassoed around the haptic/CTR. The needle was passed through a 25G needle at 12oclock through the base of the scleral flap. The prolene was externalized. The trailing suture and needle was passed into the eye similarly and externalized, lassoing the haptic to the eye superiorly. The suture was fixated and the lens remained slightly nasal de-centered but stable without tilt. The temporal conjunctiva was extensively scarred down to create an adequate peritomy so the decision was made not to perform the similar technique temporally. The peritomy was sutured and the trocars were removed and sutured watertight.     The intraocular pressure remained normal. Betadine was place on the eye. Vancomycin and Decadron were injected into the sub-Tenon space. The eye was irrigated with BSS. A drop of a cyclopentolate and Maxitrol ointment were placed on the eye, which was then patched and shielded. There were no complications.     The patient returned to the PACU in stable condition and will follow up tomorrow in the eye clinic.

## 2023-12-11 NOTE — DISCHARGE INSTRUCTIONS
Post-Operative Instructions:    - Maintain eye shield & dressing until seen tomorrow in eye clinic.  - It is all right to watch television or to read.   - Tylenol as needed for general discomfort.     - Maintain head in a HEADUP  position  - Keep your face out of water for five days after surgery - NO SHOWERS.  - No excessive exercise.    - No bending, lifting or straining.   - Call MD if significant pain or nausea / vomiting uncontrolled by medications  - Call MD if temperature in excess of 101' F  - Return to eye clinic for post op examination tomorrow morning.  - Bring medicine bag to tomorrow's appointment.    FOR EMERGENCIES:  If any unusual problems or difficulties occur, contact Dr. Polo  or the eye resident on call at [facility]

## 2023-12-11 NOTE — ANESTHESIA POSTPROCEDURE EVALUATION
Anesthesia Post Evaluation    Patient: Mc Jeter    Procedure(s) Performed: Procedure(s) (LRB):  VITRECTOMY, PARS PLANA APPROACH (Left)  REPOSITIONING, IOL (Left)    Final Anesthesia Type: MAC      Patient location during evaluation: PACU  Patient participation: Yes- Able to Participate  Level of consciousness: awake and alert  Post-procedure vital signs: reviewed and stable  Pain management: adequate  Airway patency: patent    PONV status at discharge: No PONV  Anesthetic complications: no      Cardiovascular status: blood pressure returned to baseline  Respiratory status: unassisted, room air and spontaneous ventilation  Hydration status: euvolemic  Follow-up not needed.              Vitals Value Taken Time   /89 12/11/23 1300   Temp 36.6 °C (97.9 °F) 12/11/23 1242   Pulse 61 12/11/23 1319   Resp 18 12/11/23 1315   SpO2 93 % 12/11/23 1319   Vitals shown include unvalidated device data.      No case tracking events are documented in the log.      Pain/Armin Score: Armin Score: 10 (12/11/2023  1:00 PM)

## 2023-12-11 NOTE — TRANSFER OF CARE
Anesthesia Transfer of Care Note    Patient: Mc Jeter    Procedure(s) Performed: Procedure(s) (LRB):  VITRECTOMY, PARS PLANA APPROACH (Left)  REPOSITIONING, IOL (Left)    Patient location: PACU    Anesthesia Type: MAC    Transport from OR: Transported from OR on room air with adequate spontaneous ventilation    Post pain: adequate analgesia    Post assessment: no apparent anesthetic complications and tolerated procedure well    Post vital signs: stable    Level of consciousness: awake and alert    Nausea/Vomiting: no nausea/vomiting    Complications: none    Transfer of care protocol was followed      Last vitals: Visit Vitals  BP (!) 154/91   Pulse 69   Temp 36.6 °C (97.9 °F) (Temporal)   Resp 16   Wt 91.2 kg (201 lb)   SpO2 98%   BMI 23.23 kg/m²

## 2023-12-11 NOTE — H&P
Pre-Operative History & Physical  Ophthalmology      SUBJECTIVE:     History of Present Illness:  Patient is a 68 y.o. male presents with Subluxation of left lens [H27.112].    MEDICATIONS:   No medications prior to admission.       ALLERGIES:   Review of patient's allergies indicates:   Allergen Reactions    Penicillins Rash     Age 3       PAST MEDICAL HISTORY:   Past Medical History:   Diagnosis Date    Arthritis     Polymylgia Rheumatica    Atrial fibrillation     Cataract     Diabetes mellitus     General anesthetics causing adverse effect in therapeutic use     slow to wake up    Glomerulonephritis     Hepatitis A     Hypertension     Marfan's syndrome     PONV (postoperative nausea and vomiting)     Retinal detachment 3/19/12    Left eye     PAST SURGICAL HISTORY:   Past Surgical History:   Procedure Laterality Date    CATARACT EXTRACTION  10/25/12    complex LEFT EYE    CATARACT EXTRACTION W/  INTRAOCULAR LENS IMPLANT Right 1/24/2022    Procedure: EXTRACTION, CATARACT, WITH IOL INSERTION;  Surgeon: Amaya Gallardo MD;  Location: [facility] OR;  Service: Ophthalmology;  Laterality: Right;  Cionni 1L CTR with scleral suture fixation OD  Longer case, schedule 45min with RBB       EYE SURGERY      HAND SURGERY Right     HERNIA REPAIR Right     ORIF FOOT FRACTURE Left     RENAL BIOPSY      x 4-5    REPOSITIONING OF INTRAOCULAR LENS Left 10/30/2023    Procedure: REPOSITIONING, IOL;  Surgeon: Amaya Gallardo MD;  Location: [facility] OR;  Service: Ophthalmology;  Laterality: Left;  IOL fixation OS  with Canabrava Technique 2 point (vs 3 pt)  RBB, Micro instruments, 6-0 prolene, 27G or Yamane 30G needles, low temp cautery     COMPLEX case 45min    RETINAL DETACHMENT SURGERY      Left eye    TOE AMPUTATION Left     left 3rd toe    TONSILLECTOMY      TYMPANOSTOMY TUBE PLACEMENT       PAST FAMILY HISTORY:   Family History   Problem Relation Age of Onset    Cancer Mother     Retinal detachment Mother     Macular degeneration Mother      Glaucoma Mother     Heart disease Mother     Heart disease Father     Diabetes Sister     Diabetes Brother     Cancer Brother     Melanoma Brother     Hypoglycemic Sister     Hypoglycemic Brother     Heart disease Brother     Eczema Neg Hx     Lupus Neg Hx     Psoriasis Neg Hx      SOCIAL HISTORY:   Social History     Tobacco Use    Smoking status: Never    Smokeless tobacco: Never   Substance Use Topics    Alcohol use: Yes     Comment: Ocassionally  No alcohol prior to surgery    Drug use: No        MENTAL STATUS: Alert    REVIEW OF SYSTEMS: Negative    OBJECTIVE:     Vital Signs (Most Recent)       Physical Exam:  General: NAD  HEENT: AT/NC, subluxed IOL  Lungs: Adequate respirations  Heart: + pulses  Abdomen: Soft    ASSESSMENT/PLAN:     Patient is a 68 y.o. male with Subluxation of left lens [H27.112]      - Risks/benefits/alternatives of the procedure including, but not limited to, infection, bleeding, pain, ptosis, macular edema, corneal edema, persistent corneal defect, retinal tears, retinal detachment, epiretinal membrane, elevated intraocular pressure, hypotony, possible need for strict post-op head positioning, possible temporary avoidance of air travel, loss of vision, loss of the eye, paralysis, and death were discussed with the patient and/or family. The patient/family voiced good understanding, the informed consent was signed, witnessed, and placed in chart. All patient and family questions were answered.   - Will proceed with 25G PPV/scleral sutured lens placement/possible IOL removal/secondary IOL placement OS  - Plan for MAC with retrobulbar block anesthesia   - Allergies reviewed:   Review of patient's allergies indicates:   Allergen Reactions    Penicillins Rash     Age 3       Kareem Polo MD  Vitreoretinal Surgery   Department of Ophthalmology

## 2023-12-12 ENCOUNTER — OFFICE VISIT (OUTPATIENT)
Dept: OPHTHALMOLOGY | Facility: CLINIC | Age: 68
End: 2023-12-12
Payer: MEDICARE

## 2023-12-12 ENCOUNTER — TELEPHONE (OUTPATIENT)
Dept: OPHTHALMOLOGY | Facility: CLINIC | Age: 68
End: 2023-12-12
Payer: MEDICARE

## 2023-12-12 VITALS — SYSTOLIC BLOOD PRESSURE: 132 MMHG | DIASTOLIC BLOOD PRESSURE: 82 MMHG

## 2023-12-12 DIAGNOSIS — H27.112 SUBLUXATION OF LEFT LENS: Primary | ICD-10-CM

## 2023-12-12 DIAGNOSIS — T85.22XA DISLOCATED IOL (INTRAOCULAR LENS), POSTERIOR, LEFT: ICD-10-CM

## 2023-12-12 PROCEDURE — 1101F PR PT FALLS ASSESS DOC 0-1 FALLS W/OUT INJ PAST YR: ICD-10-PCS | Mod: CPTII,S$GLB,, | Performed by: OPHTHALMOLOGY

## 2023-12-12 PROCEDURE — 3044F HG A1C LEVEL LT 7.0%: CPT | Mod: CPTII,S$GLB,, | Performed by: OPHTHALMOLOGY

## 2023-12-12 PROCEDURE — 3079F DIAST BP 80-89 MM HG: CPT | Mod: CPTII,S$GLB,, | Performed by: OPHTHALMOLOGY

## 2023-12-12 PROCEDURE — 1101F PT FALLS ASSESS-DOCD LE1/YR: CPT | Mod: CPTII,S$GLB,, | Performed by: OPHTHALMOLOGY

## 2023-12-12 PROCEDURE — 3288F FALL RISK ASSESSMENT DOCD: CPT | Mod: CPTII,S$GLB,, | Performed by: OPHTHALMOLOGY

## 2023-12-12 PROCEDURE — 3075F PR MOST RECENT SYSTOLIC BLOOD PRESS GE 130-139MM HG: ICD-10-PCS | Mod: CPTII,S$GLB,, | Performed by: OPHTHALMOLOGY

## 2023-12-12 PROCEDURE — 4010F PR ACE/ARB THEARPY RXD/TAKEN: ICD-10-PCS | Mod: CPTII,S$GLB,, | Performed by: OPHTHALMOLOGY

## 2023-12-12 PROCEDURE — 99999 PR PBB SHADOW E&M-EST. PATIENT-LVL I: CPT | Mod: PBBFAC,,, | Performed by: OPHTHALMOLOGY

## 2023-12-12 PROCEDURE — 99024 PR POST-OP FOLLOW-UP VISIT: ICD-10-PCS | Mod: S$GLB,,, | Performed by: OPHTHALMOLOGY

## 2023-12-12 PROCEDURE — 3075F SYST BP GE 130 - 139MM HG: CPT | Mod: CPTII,S$GLB,, | Performed by: OPHTHALMOLOGY

## 2023-12-12 PROCEDURE — 3288F PR FALLS RISK ASSESSMENT DOCUMENTED: ICD-10-PCS | Mod: CPTII,S$GLB,, | Performed by: OPHTHALMOLOGY

## 2023-12-12 PROCEDURE — 1125F PR PAIN SEVERITY QUANTIFIED, PAIN PRESENT: ICD-10-PCS | Mod: CPTII,S$GLB,, | Performed by: OPHTHALMOLOGY

## 2023-12-12 PROCEDURE — 99999 PR PBB SHADOW E&M-EST. PATIENT-LVL I: ICD-10-PCS | Mod: PBBFAC,,, | Performed by: OPHTHALMOLOGY

## 2023-12-12 PROCEDURE — 3044F PR MOST RECENT HEMOGLOBIN A1C LEVEL <7.0%: ICD-10-PCS | Mod: CPTII,S$GLB,, | Performed by: OPHTHALMOLOGY

## 2023-12-12 PROCEDURE — 99024 POSTOP FOLLOW-UP VISIT: CPT | Mod: S$GLB,,, | Performed by: OPHTHALMOLOGY

## 2023-12-12 PROCEDURE — 3079F PR MOST RECENT DIASTOLIC BLOOD PRESSURE 80-89 MM HG: ICD-10-PCS | Mod: CPTII,S$GLB,, | Performed by: OPHTHALMOLOGY

## 2023-12-12 PROCEDURE — 1125F AMNT PAIN NOTED PAIN PRSNT: CPT | Mod: CPTII,S$GLB,, | Performed by: OPHTHALMOLOGY

## 2023-12-12 PROCEDURE — 4010F ACE/ARB THERAPY RXD/TAKEN: CPT | Mod: CPTII,S$GLB,, | Performed by: OPHTHALMOLOGY

## 2023-12-12 NOTE — PROGRESS NOTES
HPI    POD 1 Pars plana vitrectomy, lens reposition of the Left  eye  4 on oain scale today     DLS 11/30/2023 by Dr. Amaya Gallardo MD    CC: pt reports : vision is blurry OS due to lens placement fell    ++blurred Va  -diplopia  -eye pain   -flashes/++floaters (suture appearance of a rope)  -headaches  -curtain/shadow/veils    Eye Gtt's: PF TID OS     POHx:   Dislocation of IOL ( Posterior OS)  S/P phaco repositioning OS.  Last edited by Daisy Frias on 12/12/2023 10:43 AM.         A/P    ICD-10-CM ICD-9-CM   1. Subluxation of left lens  H27.112 379.32   2. Dislocated IOL (intraocular lens), posterior, left  T85.22XA 996.53         1. Dislocated IOL (intraocular lens), posterior, left  2. Marfan syndrome  Referral from Dr. Gallardo for dislocated lens eval  Pt has had lens repositioning after CEIOL this past fall, now with further dislocated lens out of visual axis    Pre-op Exam   notable for inf dislocated lens still attached by inferior prolene, lodged in inferior vit base, no RT/RD on exam     Postop: s/p PPV/IOL reposition (Date 12/11/23 by Chaz/Melani) for dislocated IOL   Positioning: Upright  Duration: 5 days    12/12/2023 VA 20/200, IOP ok, nasally decentered but stable appearing lens, had modest amount of peripheral vitreous during surgery that we cleared, likely was etiology for initial fixation difficulty. We tried to temporally lasso the CTR but given significant amount of conjunctival scarring temporally for good peritomy and scleral flap, we deferred. Will need staged procedure likely with Dr. Gallardo for canabrava temporal fixation     Instructions reviewed   - RD precautions  - Return for increasing pain/decreasing vision  - No getting the eye wet, no rubbing the eye, no heavy lifting for 1 month after surgery  Drops:  Vigamox  - 4   Pred - 4(start 12/12/2023)  Cyc -4  Maxitrol - QHS    Mac off RRD: will obtain [employer] CFF/FAF, OCT at [facility]  [facility]/ERM:  will obtain [place] FAF/OCT at [facility]  Secondary Lens:  Will obtain Topography, Lens tilt measurement at [facility]   3. Retinal detachment, left  Remote hx of RD repair with Dr. Cristobal 12 yrs ago  Attached today, good laser no new RT/RD  Plan: Observation  RD precautions discussed in detail, patient expressed understanding  RTC immediately PRN (especially ANY change flashes, floaters, vision, visual field)     4. Pseudophakia of both eyes  Good lens position OD, no pseudophacodonesis  Dislocated lens OS  Plan: as above OS for dislocated lens, observe OD    5. Vitreous degeneration, right  No RT/RD, good 360 barricade laser  Plan: Observation    RTC POW1 DFE/OCTm OS        I saw and examined the patient and reviewed in detail the findings documented. The final examination findings, image interpretations which have been independently interpreted, and plan as documented in the record represent my personal judgment and conclusions.    Roddy Ware MD  Vitreoretinal Surgery   [facility]

## 2023-12-19 ENCOUNTER — TELEPHONE (OUTPATIENT)
Dept: OPHTHALMOLOGY | Facility: CLINIC | Age: 68
End: 2023-12-19
Payer: MEDICARE

## 2023-12-19 ENCOUNTER — OFFICE VISIT (OUTPATIENT)
Dept: OPHTHALMOLOGY | Facility: CLINIC | Age: 68
End: 2023-12-19
Payer: MEDICARE

## 2023-12-19 DIAGNOSIS — H27.112 SUBLUXATION OF LEFT LENS: Primary | ICD-10-CM

## 2023-12-19 DIAGNOSIS — T85.22XA DISLOCATED IOL (INTRAOCULAR LENS), POSTERIOR, LEFT: ICD-10-CM

## 2023-12-19 DIAGNOSIS — H33.22 RETINAL DETACHMENT, LEFT: ICD-10-CM

## 2023-12-19 DIAGNOSIS — Q87.40 MARFAN SYNDROME: ICD-10-CM

## 2023-12-19 PROCEDURE — 1160F PR REVIEW ALL MEDS BY PRESCRIBER/CLIN PHARMACIST DOCUMENTED: ICD-10-PCS | Mod: CPTII,S$GLB,, | Performed by: OPHTHALMOLOGY

## 2023-12-19 PROCEDURE — 3288F FALL RISK ASSESSMENT DOCD: CPT | Mod: CPTII,S$GLB,, | Performed by: OPHTHALMOLOGY

## 2023-12-19 PROCEDURE — 99024 POSTOP FOLLOW-UP VISIT: CPT | Mod: S$GLB,,, | Performed by: OPHTHALMOLOGY

## 2023-12-19 PROCEDURE — 3044F PR MOST RECENT HEMOGLOBIN A1C LEVEL <7.0%: ICD-10-PCS | Mod: CPTII,S$GLB,, | Performed by: OPHTHALMOLOGY

## 2023-12-19 PROCEDURE — 1101F PT FALLS ASSESS-DOCD LE1/YR: CPT | Mod: CPTII,S$GLB,, | Performed by: OPHTHALMOLOGY

## 2023-12-19 PROCEDURE — 4010F ACE/ARB THERAPY RXD/TAKEN: CPT | Mod: CPTII,S$GLB,, | Performed by: OPHTHALMOLOGY

## 2023-12-19 PROCEDURE — 3288F PR FALLS RISK ASSESSMENT DOCUMENTED: ICD-10-PCS | Mod: CPTII,S$GLB,, | Performed by: OPHTHALMOLOGY

## 2023-12-19 PROCEDURE — 1101F PR PT FALLS ASSESS DOC 0-1 FALLS W/OUT INJ PAST YR: ICD-10-PCS | Mod: CPTII,S$GLB,, | Performed by: OPHTHALMOLOGY

## 2023-12-19 PROCEDURE — 1159F PR MEDICATION LIST DOCUMENTED IN MEDICAL RECORD: ICD-10-PCS | Mod: CPTII,S$GLB,, | Performed by: OPHTHALMOLOGY

## 2023-12-19 PROCEDURE — 3044F HG A1C LEVEL LT 7.0%: CPT | Mod: CPTII,S$GLB,, | Performed by: OPHTHALMOLOGY

## 2023-12-19 PROCEDURE — 1126F PR PAIN SEVERITY QUANTIFIED, NO PAIN PRESENT: ICD-10-PCS | Mod: CPTII,S$GLB,, | Performed by: OPHTHALMOLOGY

## 2023-12-19 PROCEDURE — 99999 PR PBB SHADOW E&M-EST. PATIENT-LVL III: ICD-10-PCS | Mod: PBBFAC,,, | Performed by: OPHTHALMOLOGY

## 2023-12-19 PROCEDURE — 99999 PR PBB SHADOW E&M-EST. PATIENT-LVL III: CPT | Mod: PBBFAC,,, | Performed by: OPHTHALMOLOGY

## 2023-12-19 PROCEDURE — 1159F MED LIST DOCD IN RCRD: CPT | Mod: CPTII,S$GLB,, | Performed by: OPHTHALMOLOGY

## 2023-12-19 PROCEDURE — 1160F RVW MEDS BY RX/DR IN RCRD: CPT | Mod: CPTII,S$GLB,, | Performed by: OPHTHALMOLOGY

## 2023-12-19 PROCEDURE — 92134 CPTRZ OPH DX IMG PST SGM RTA: CPT | Mod: S$GLB,,, | Performed by: OPHTHALMOLOGY

## 2023-12-19 PROCEDURE — 92134 OCT, RETINA - OU - BOTH EYES: ICD-10-PCS | Mod: S$GLB,,, | Performed by: OPHTHALMOLOGY

## 2023-12-19 PROCEDURE — 4010F PR ACE/ARB THEARPY RXD/TAKEN: ICD-10-PCS | Mod: CPTII,S$GLB,, | Performed by: OPHTHALMOLOGY

## 2023-12-19 PROCEDURE — 99024 PR POST-OP FOLLOW-UP VISIT: ICD-10-PCS | Mod: S$GLB,,, | Performed by: OPHTHALMOLOGY

## 2023-12-19 PROCEDURE — 1126F AMNT PAIN NOTED NONE PRSNT: CPT | Mod: CPTII,S$GLB,, | Performed by: OPHTHALMOLOGY

## 2023-12-19 NOTE — PROGRESS NOTES
HPI    POD 1 wk Pars plana vitrectomy, lens reposition of the Left  eye     DLS 12/12/2023  by Dr. DAVID Ware MD    CC: pt reports : vision is blurry OS due to lens placement fell    ++blurred Va  -diplopia  -eye pain   -flashes/++floaters (suture appearance of a rope)  -headaches  -curtain/shadow/veils    Eye Gtt's: PF TID OS                  AT OU PRN     POHx:   1. Dislocation of IOL ( Posterior OS)  S/P phaco repositioning OS w Vitrectomy     2. Subluxation of Lens  3. Marfan Syndrome  Last edited by Anali Brown MA on 12/19/2023 11:01 AM.         A/P    ICD-10-CM ICD-9-CM   1. Subluxation of left lens  H27.112 379.32   2. Dislocated IOL (intraocular lens), posterior, left  T85.22XA 996.53   3. Marfan syndrome  Q87.40 759.82   4. Retinal detachment, left  H33.22 361.9           1. Dislocated IOL (intraocular lens), posterior, left  2. Marfan syndrome  Referral from Dr. Gallardo for dislocated lens eval  Pt has had lens repositioning after CEIOL this past fall, now with further dislocated lens out of visual axis    Pre-op Exam   notable for inf dislocated lens still attached by inferior prolene, lodged in inferior vit base, no RT/RD on exam     Postop: s/p PPV/IOL reposition (Date 12/11/23 by Chaz/Melani) for dislocated IOL   Positioning: Upright  Duration: 5 days    12/19/2023 VA 20/60 (was 20/200), IOP 10, nasally decentered but stable, vision improving, We tried to temporally lasso the CTR but given significant amount of conjunctival scarring temporally for good peritomy and scleral flap, we deferred. Will need staged procedure likely with Dr. Gallardo for canabrava temporal fixation     Instructions reviewed   - RD precautions  - Return for increasing pain/decreasing vision  - No getting the eye wet, no rubbing the eye, no heavy lifting for 1 month after surgery  Drops:  Vigamox  - 4  stop  Pred - 4(start weekly taper  Cyc -4 stop  Maxitrol - QHS stop     3. Retinal detachment, left  Remote hx of RD repair with  Dr. Cristobal 12 yrs ago  Attached today, good laser no new RT/RD  Plan: Observation  RD precautions discussed in detail, patient expressed understanding  RTC immediately PRN (especially ANY change flashes, floaters, vision, visual field)     4. Pseudophakia of both eyes  Good lens position OD, no pseudophacodonesis  Dislocated lens OS  Plan: as above OS for dislocated lens, observe OD    5. Vitreous degeneration, right  No RT/RD, good 360 barricade laser  Plan: Observation    RTC POM1 DFE/OCTm OS  RTC Paulina in 6-8 weeks for eval for lens repositioning       I saw and examined the patient and reviewed in detail the findings documented. The final examination findings, image interpretations which have been independently interpreted, and plan as documented in the record represent my personal judgment and conclusions.    Roddy Ware MD  Vitreoretinal Surgery   [facility]

## 2023-12-19 NOTE — TELEPHONE ENCOUNTER
----- Message from Ivory Obrien sent at 12/19/2023 12:57 PM CST -----  Regarding: appt  Pt needs appt 6-8 week appt. Thanks.

## 2024-01-04 ENCOUNTER — TELEPHONE (OUTPATIENT)
Dept: OPHTHALMOLOGY | Facility: CLINIC | Age: 69
End: 2024-01-04
Payer: MEDICARE

## 2024-01-04 ENCOUNTER — PATIENT MESSAGE (OUTPATIENT)
Dept: OPHTHALMOLOGY | Facility: CLINIC | Age: 69
End: 2024-01-04
Payer: MEDICARE

## 2024-01-05 ENCOUNTER — OFFICE VISIT (OUTPATIENT)
Dept: OPHTHALMOLOGY | Facility: CLINIC | Age: 69
End: 2024-01-05
Payer: MEDICARE

## 2024-01-05 DIAGNOSIS — H27.112 SUBLUXATION OF LEFT LENS: Primary | ICD-10-CM

## 2024-01-05 PROCEDURE — 99024 POSTOP FOLLOW-UP VISIT: CPT | Mod: S$GLB,,, | Performed by: OPHTHALMOLOGY

## 2024-01-05 PROCEDURE — 3288F FALL RISK ASSESSMENT DOCD: CPT | Mod: CPTII,S$GLB,, | Performed by: OPHTHALMOLOGY

## 2024-01-05 PROCEDURE — 1101F PT FALLS ASSESS-DOCD LE1/YR: CPT | Mod: CPTII,S$GLB,, | Performed by: OPHTHALMOLOGY

## 2024-01-05 PROCEDURE — 99999 PR PBB SHADOW E&M-EST. PATIENT-LVL I: CPT | Mod: PBBFAC,,, | Performed by: OPHTHALMOLOGY

## 2024-01-05 PROCEDURE — 1125F AMNT PAIN NOTED PAIN PRSNT: CPT | Mod: CPTII,S$GLB,, | Performed by: OPHTHALMOLOGY

## 2024-01-05 NOTE — PROGRESS NOTES
HPI    Urgent Eye pain OS    Pt states having a dull ache with consistently for about a week. Sharp   pain with eye movement. Pt states pain sits at a 4 constantly but   increased with eye movement especially when looking up, nasal.   Some white milky discharge with crusting in AM.   No floaters  No flashes  Gtts: PF Qday OS  AT's PRN OU  Last edited by Marcella Villatoro on 1/5/2024 10:44 AM.         A/P    ICD-10-CM ICD-9-CM   1. Subluxation of left lens  H27.112 379.32             1. Dislocated IOL (intraocular lens), posterior, left  2. Marfan syndrome  Referral from Dr. Gallardo for dislocated lens eval  Pt has had lens repositioning after CEIOL this past fall, now with further dislocated lens out of visual axis    Pre-op Exam   notable for inf dislocated lens still attached by inferior prolene, lodged in inferior vit base, no RT/RD on exam     Postop: s/p PPV/IOL reposition (Date 12/11/23 by Chaz/Melani) for dislocated IOL        1/5/2024 pt here for dull ache OS, VA 20/60 (stable), IOP good, lens nasal decentered but stable, has vicryl sutures still present that have not dissolved yet. Likely cause of discomfort. No cell/flare     Instructions reviewed   - RD precautions  - Return for increasing pain/decreasing vision  - No getting the eye wet, no rubbing the eye, no heavy lifting for 1 month after surgery  Drops:    Pred - daily for 1 more week   Can add Ats QID also     3. Retinal detachment, left  Remote hx of RD repair with Dr. Cristobal 12 yrs ago  Attached today, good laser no new RT/RD  Plan: Observation  RD precautions discussed in detail, patient expressed understanding  RTC immediately PRN (especially ANY change flashes, floaters, vision, visual field)     4. Pseudophakia of both eyes  Good lens position OD, no pseudophacodonesis  Dislocated lens OS  Plan: as above OS for dislocated lens, observe OD    5. Vitreous degeneration, right  No RT/RD, good 360 barricade laser  Plan: Observation    RTC POM1 DFE/OCTm  OS  RTC Paulina in 6-8 weeks for eval for lens repositioning       I saw and examined the patient and reviewed in detail the findings documented. The final examination findings, image interpretations which have been independently interpreted, and plan as documented in the record represent my personal judgment and conclusions.    Roddy Ware MD  Vitreoretinal Surgery   [facility]

## 2024-01-16 ENCOUNTER — OFFICE VISIT (OUTPATIENT)
Dept: OPHTHALMOLOGY | Facility: CLINIC | Age: 69
End: 2024-01-16
Payer: MEDICARE

## 2024-01-16 DIAGNOSIS — Z98.890 S/P LASIK (LASER ASSISTED IN SITU KERATOMILEUSIS): ICD-10-CM

## 2024-01-16 DIAGNOSIS — T85.22XA DISLOCATED IOL (INTRAOCULAR LENS), POSTERIOR, LEFT: ICD-10-CM

## 2024-01-16 DIAGNOSIS — Q12.1 ECTOPIA LENTIS: Primary | ICD-10-CM

## 2024-01-16 PROCEDURE — 99999 PR PBB SHADOW E&M-EST. PATIENT-LVL III: CPT | Mod: PBBFAC,,, | Performed by: OPHTHALMOLOGY

## 2024-01-16 PROCEDURE — 3288F FALL RISK ASSESSMENT DOCD: CPT | Mod: CPTII,S$GLB,, | Performed by: OPHTHALMOLOGY

## 2024-01-16 PROCEDURE — 99024 POSTOP FOLLOW-UP VISIT: CPT | Mod: S$GLB,,, | Performed by: OPHTHALMOLOGY

## 2024-01-16 PROCEDURE — 1101F PT FALLS ASSESS-DOCD LE1/YR: CPT | Mod: CPTII,S$GLB,, | Performed by: OPHTHALMOLOGY

## 2024-01-16 PROCEDURE — 1126F AMNT PAIN NOTED NONE PRSNT: CPT | Mod: CPTII,S$GLB,, | Performed by: OPHTHALMOLOGY

## 2024-01-16 PROCEDURE — 1159F MED LIST DOCD IN RCRD: CPT | Mod: CPTII,S$GLB,, | Performed by: OPHTHALMOLOGY

## 2024-01-16 PROCEDURE — 1160F RVW MEDS BY RX/DR IN RCRD: CPT | Mod: CPTII,S$GLB,, | Performed by: OPHTHALMOLOGY

## 2024-01-16 NOTE — PROGRESS NOTES
HPI    Patient present today for 6-8 week follow up   Pt state little pain OS. Blurry  OS   No other complaints at this time          POHx:   1. Dislocation of IOL ( Posterior OS)   S/P phaco repositioning OS w Vitrectomy     2. Subluxation of Lens   3. Marfan Syndrome     Last edited by Jaden Christopher on 1/16/2024  2:02 PM.            Assessment /Plan     For exam results, see Encounter Report.    Ectopia lentis    S/P LASIK (laser assisted in situ keratomileusis)    Dislocated IOL (intraocular lens), posterior, left        01/24/2022 CTR Type 14C / CNA0T0 24.0 OD  10/25/2012 CTR Type 1L / [id number] 23.0 OS    Superior dislocation, with repair x2 (Me and then Chaz)  Stable but nasally decentered, PCO so YAG later

## 2024-01-22 NOTE — PROGRESS NOTES
HPI     1 mo DFE/OCTm OS    DLS 01/05/2024 by Dr. DAVID Ware MD    CC: pt reports: eyes are dry w/ pain     --Blurred Va  ++Diplopia and offset to the nasal side  --Eye Pain   --Flashes./--Floaters  --Headaches  --Curtain/Shadow/Veils    Eye Meds; NONE    POHx:   1. Dislocation of IOL ( Posterior OS)   S/P PPV/IOL repositioning OS w Vitrectomy after dislocated  CEIOL    (12/11/2023)    2. Subluxation of Lens   3. Marfan Syndrome     Last edited by Anali Brown MA on 1/23/2024 11:43 AM.     HPI     1 mo DFE/OCTm OS    DLS 01/05/2024 by Dr. DAVID Ware MD    CC: pt reports: eyes are dry w/ pain     --Blurred Va  ++Diplopia and offset to the nasal side  --Eye Pain   --Flashes./--Floaters  --Headaches  --Curtain/Shadow/Veils    Eye Meds; NONE    POHx:   1. Dislocation of IOL ( Posterior OS)   S/P PPV/IOL repositioning OS w Vitrectomy after dislocated  CEIOL    (12/11/2023)    2. Subluxation of Lens   3. Marfan Syndrome     Last edited by Anali Brown MA on 1/23/2024 11:43 AM.         A/P    ICD-10-CM ICD-9-CM   1. Dislocated IOL (intraocular lens), posterior, left  T85.22XA 996.53   2. Marfan syndrome  Q87.40 759.82   3. Retinal detachment, left  H33.22 361.9   4. Vitreous degeneration, right  H43.811 379.21   5. Pseudophakia of both eyes  Z96.1 V43.1         1. Dislocated IOL (intraocular lens), posterior, left  2. Marfan syndrome  Referral from Dr. Gallardo for dislocated lens eval  Pt has had lens repositioning after CEIOL this past fall, now with further dislocated lens out of visual axis    Pre-op Exam   notable for inf dislocated lens still attached by inferior prolene, lodged in inferior vit base, no RT/RD on exam     Postop: s/p PPV/IOL reposition (Date 12/11/23 by Chaz/Melani) for dislocated IOL        1/23/2024  VA 20/40 ph (was 20/70), IOP good, lens nasal decentered but actually appears improved than last time. Dull ache better. Pt saw Dr Gallardo recently, planning for YAG in coming weeks      Instructions  reviewed   - RD precautions  - Return for increasing pain/decreasing vision    3. Retinal detachment, left  Remote hx of RD repair with Dr. Cristobal 12 yrs ago  Attached today, good laser no new RT/RD  Plan: Observation  RD precautions discussed in detail, patient expressed understanding  RTC immediately PRN (especially ANY change flashes, floaters, vision, visual field)     4. Pseudophakia of both eyes  Good lens position OD, no pseudophacodonesis  Dislocated lens OS  Plan: as above OS for dislocated lens, observe OD    5. Vitreous degeneration, right  No RT/RD, good 360 barricade laser  Plan: Observation    RTC Gallardo as scheduled for YAG, can f/u with me 1-2 mo after YAG procedure      I saw and examined the patient and reviewed in detail the findings documented. The final examination findings, image interpretations which have been independently interpreted, and plan as documented in the record represent my personal judgment and conclusions.    Roddy Ware MD  Vitreoretinal Surgery   [facility]

## 2024-01-23 ENCOUNTER — OFFICE VISIT (OUTPATIENT)
Dept: OPHTHALMOLOGY | Facility: CLINIC | Age: 69
End: 2024-01-23
Payer: MEDICARE

## 2024-01-23 DIAGNOSIS — Z96.1 PSEUDOPHAKIA OF BOTH EYES: ICD-10-CM

## 2024-01-23 DIAGNOSIS — H43.811 VITREOUS DEGENERATION, RIGHT: ICD-10-CM

## 2024-01-23 DIAGNOSIS — H33.22 RETINAL DETACHMENT, LEFT: ICD-10-CM

## 2024-01-23 DIAGNOSIS — Q87.40 MARFAN SYNDROME: ICD-10-CM

## 2024-01-23 DIAGNOSIS — T85.22XA DISLOCATED IOL (INTRAOCULAR LENS), POSTERIOR, LEFT: Primary | ICD-10-CM

## 2024-01-23 PROCEDURE — 99024 POSTOP FOLLOW-UP VISIT: CPT | Mod: S$GLB,,, | Performed by: OPHTHALMOLOGY

## 2024-01-23 PROCEDURE — 1159F MED LIST DOCD IN RCRD: CPT | Mod: CPTII,S$GLB,, | Performed by: OPHTHALMOLOGY

## 2024-01-23 PROCEDURE — 92134 CPTRZ OPH DX IMG PST SGM RTA: CPT | Mod: S$GLB,,, | Performed by: OPHTHALMOLOGY

## 2024-01-23 PROCEDURE — 4010F ACE/ARB THERAPY RXD/TAKEN: CPT | Mod: CPTII,S$GLB,, | Performed by: OPHTHALMOLOGY

## 2024-01-23 PROCEDURE — 99999 PR PBB SHADOW E&M-EST. PATIENT-LVL III: CPT | Mod: PBBFAC,,, | Performed by: OPHTHALMOLOGY

## 2024-02-27 ENCOUNTER — PROCEDURE VISIT (OUTPATIENT)
Dept: OPHTHALMOLOGY | Facility: CLINIC | Age: 69
End: 2024-02-27
Payer: MEDICARE

## 2024-02-27 DIAGNOSIS — H26.492 PCO (POSTERIOR CAPSULAR OPACIFICATION), LEFT: Primary | ICD-10-CM

## 2024-02-27 PROCEDURE — 66821 AFTER CATARACT LASER SURGERY: CPT | Mod: 79,LT,S$GLB, | Performed by: OPHTHALMOLOGY

## 2024-02-27 PROCEDURE — 99499 UNLISTED E&M SERVICE: CPT | Mod: S$GLB,,, | Performed by: OPHTHALMOLOGY

## 2024-02-27 RX ORDER — PILOCARPINE HYDROCHLORIDE 10 MG/ML
1 SOLUTION/ DROPS OPHTHALMIC 3 TIMES DAILY
Qty: 15 ML | Refills: 6 | Status: SHIPPED | OUTPATIENT
Start: 2024-02-27 | End: 2025-02-26

## 2024-02-27 NOTE — PROGRESS NOTES
HPI    Patient present today for YAG OS  Pt state blurry/ cloudy OS  State no other complaint at this time       OHx:   1. Dislocation of IOL ( Posterior OS)   S/P PPV/IOL repositioning OS w Vitrectomy after dislocated  CEIOL    (12/11/2023)     2. Subluxation of Lens   3. Marfan Syndrome     Last edited by Jaden Christopher on 2/27/2024  2:04 PM.            Assessment /Plan     For exam results, see Encounter Report.    PCO (posterior capsular opacification), left      Visually significant posterior capsular opacity present.  Discussed risks, benefits, and alternatives to laser surgery.  YAG laser capsulotomy Procedure Note:   Informed consent obtained and correct eye(s) verified with patient.  1 drop of topical Proparacaine and Iopidine instilled, and eye(s) dilated with 1% Tropicamide 2.5% Phenylephrine.  YAG laser applied to posterior capsule in cruciate pattern OS  Patient tolerated procedure well. No complications. Follow up in 1 month/PRN.

## 2024-03-11 ENCOUNTER — PATIENT MESSAGE (OUTPATIENT)
Dept: OPHTHALMOLOGY | Facility: CLINIC | Age: 69
End: 2024-03-11
Payer: MEDICARE

## (undated) DEVICE — COVER MAYO STAND REINFRCD 30

## (undated) DEVICE — SOL WATER STRL IRR 1000ML

## (undated) DEVICE — DRESSING EYE OVAL LF

## (undated) DEVICE — FORCEP GRASPING 25GA SMOOTH

## (undated) DEVICE — KNIFE OPHTH MICRO UNITOME 5MM

## (undated) DEVICE — HOLDER TUBE

## (undated) DEVICE — NDL RETROBLB BLOCK 25GX1.5IN

## (undated) DEVICE — Device

## (undated) DEVICE — KIT GREY EYE

## (undated) DEVICE — SOL POVIDONE SCRUB IODINE 4 OZ

## (undated) DEVICE — SYR SLIP TIP 1CC

## (undated) DEVICE — BACKFLUSH 25GA SOFT-TIP DISP

## (undated) DEVICE — GOWN SURGICAL X-LARGE

## (undated) DEVICE — SYRINGE 30CC LL W/O NDL

## (undated) DEVICE — RETRACTOR IRIS FLEX 5PC DISP

## (undated) DEVICE — SYR DISP LL 5CC

## (undated) DEVICE — SOL PVP-I SCRUB 7.5% 4OZ

## (undated) DEVICE — TRAY MUSCLE LID EYE

## (undated) DEVICE — SYR 1CC TB SG 27GX1/2

## (undated) DEVICE — SOL BETADINE 5%

## (undated) DEVICE — FORCEP GRIESHABER MAXGRIP 25G

## (undated) DEVICE — SOL BALANCED SALT 500ML

## (undated) DEVICE — DRAPE THREE-QTR REINF 53X77IN

## (undated) DEVICE — GLOVE BIOGEL SKINSENSE PI 7.5

## (undated) DEVICE — SYR 10CC LUER LOCK

## (undated) DEVICE — PACK TOTAL PLUS 25G VITRECTOMY

## (undated) DEVICE — PACK INSTRUMENT COVER DISPO

## (undated) DEVICE — STRIP MEDI WND CLSR 1/2X4IN

## (undated) DEVICE — CORD FOR BIPOLAR FORCEPS 12

## (undated) DEVICE — SKINMARKER & RULER REGULAR X-F

## (undated) DEVICE — NDL 22GA X1 1/2 REG BEVEL

## (undated) DEVICE — LENS VITRCTMY OPHTH 30DEG 59DE

## (undated) DEVICE — COVER PROXIMA MAYO STAND

## (undated) DEVICE — NEEDLE HYPODERMIC HUB LUER LOC

## (undated) DEVICE — DUOVISC

## (undated) DEVICE — CONTAINER SPECIMEN STRL 4OZ

## (undated) DEVICE — SUT 7/0 18IN COATED VICRYL

## (undated) DEVICE — NDL HYPO A BEVEL 30X1/2

## (undated) DEVICE — PROBE ILLUM FLEX CURVE LASER

## (undated) DEVICE — SOL BSS BALANCED SALT